# Patient Record
Sex: FEMALE | Race: WHITE | NOT HISPANIC OR LATINO
[De-identification: names, ages, dates, MRNs, and addresses within clinical notes are randomized per-mention and may not be internally consistent; named-entity substitution may affect disease eponyms.]

---

## 2017-02-06 ENCOUNTER — APPOINTMENT (OUTPATIENT)
Dept: OBGYN | Facility: CLINIC | Age: 67
End: 2017-02-06
Payer: MEDICARE

## 2017-02-06 VITALS
BODY MASS INDEX: 26.19 KG/M2 | HEIGHT: 64 IN | DIASTOLIC BLOOD PRESSURE: 85 MMHG | WEIGHT: 153.4 LBS | SYSTOLIC BLOOD PRESSURE: 130 MMHG | HEART RATE: 65 BPM

## 2017-02-06 PROCEDURE — 99213 OFFICE O/P EST LOW 20 MIN: CPT

## 2017-05-03 ENCOUNTER — APPOINTMENT (OUTPATIENT)
Dept: OBGYN | Facility: CLINIC | Age: 67
End: 2017-05-03

## 2017-05-03 VITALS
HEART RATE: 54 BPM | SYSTOLIC BLOOD PRESSURE: 147 MMHG | WEIGHT: 157 LBS | DIASTOLIC BLOOD PRESSURE: 83 MMHG | HEIGHT: 64 IN | BODY MASS INDEX: 26.8 KG/M2

## 2017-08-07 ENCOUNTER — APPOINTMENT (OUTPATIENT)
Dept: OBGYN | Facility: CLINIC | Age: 67
End: 2017-08-07
Payer: MEDICARE

## 2017-08-07 VITALS
SYSTOLIC BLOOD PRESSURE: 128 MMHG | HEART RATE: 59 BPM | DIASTOLIC BLOOD PRESSURE: 74 MMHG | BODY MASS INDEX: 26.63 KG/M2 | WEIGHT: 156 LBS | HEIGHT: 64 IN

## 2017-08-07 PROCEDURE — 99213 OFFICE O/P EST LOW 20 MIN: CPT

## 2017-11-06 ENCOUNTER — APPOINTMENT (OUTPATIENT)
Dept: OBGYN | Facility: CLINIC | Age: 67
End: 2017-11-06
Payer: MEDICARE

## 2017-11-06 VITALS
DIASTOLIC BLOOD PRESSURE: 91 MMHG | BODY MASS INDEX: 26.63 KG/M2 | HEIGHT: 64 IN | SYSTOLIC BLOOD PRESSURE: 180 MMHG | HEART RATE: 71 BPM | WEIGHT: 156 LBS

## 2017-11-06 DIAGNOSIS — N89.8 OTHER SPECIFIED NONINFLAMMATORY DISORDERS OF VAGINA: ICD-10-CM

## 2017-11-06 DIAGNOSIS — R30.0 DYSURIA: ICD-10-CM

## 2017-11-06 LAB
BILIRUB UR QL STRIP: NORMAL
GLUCOSE UR-MCNC: NORMAL
HCG UR QL: 0.2 EU/DL
HGB UR QL STRIP.AUTO: NORMAL
KETONES UR-MCNC: NORMAL
LEUKOCYTE ESTERASE UR QL STRIP: NORMAL
NITRITE UR QL STRIP: POSITIVE
PH UR STRIP: 6
PROT UR STRIP-MCNC: NORMAL
SP GR UR STRIP: 1.02

## 2017-11-06 PROCEDURE — 99214 OFFICE O/P EST MOD 30 MIN: CPT

## 2017-11-06 RX ORDER — NITROFURANTOIN (MONOHYDRATE/MACROCRYSTALS) 25; 75 MG/1; MG/1
100 CAPSULE ORAL TWICE DAILY
Qty: 14 | Refills: 0 | Status: ACTIVE | COMMUNITY
Start: 2017-11-06 | End: 1900-01-01

## 2017-12-16 LAB — BACTERIA UR CULT: ABNORMAL

## 2018-02-05 ENCOUNTER — APPOINTMENT (OUTPATIENT)
Dept: OBGYN | Facility: CLINIC | Age: 68
End: 2018-02-05
Payer: MEDICARE

## 2018-02-05 VITALS
WEIGHT: 156 LBS | DIASTOLIC BLOOD PRESSURE: 83 MMHG | HEIGHT: 64 IN | HEART RATE: 71 BPM | SYSTOLIC BLOOD PRESSURE: 141 MMHG | BODY MASS INDEX: 26.63 KG/M2

## 2018-02-05 PROCEDURE — 99213 OFFICE O/P EST LOW 20 MIN: CPT

## 2018-05-07 ENCOUNTER — APPOINTMENT (OUTPATIENT)
Dept: OBGYN | Facility: CLINIC | Age: 68
End: 2018-05-07
Payer: MEDICARE

## 2018-05-07 VITALS
DIASTOLIC BLOOD PRESSURE: 91 MMHG | HEART RATE: 55 BPM | SYSTOLIC BLOOD PRESSURE: 159 MMHG | WEIGHT: 156 LBS | BODY MASS INDEX: 26.63 KG/M2 | HEIGHT: 64 IN

## 2018-05-07 DIAGNOSIS — Z01.419 ENCOUNTER FOR GYNECOLOGICAL EXAMINATION (GENERAL) (ROUTINE) W/OUT ABNORMAL FINDINGS: ICD-10-CM

## 2018-05-07 PROCEDURE — G0101: CPT

## 2018-08-08 ENCOUNTER — APPOINTMENT (OUTPATIENT)
Dept: OBGYN | Facility: CLINIC | Age: 68
End: 2018-08-08
Payer: MEDICARE

## 2018-08-08 VITALS
SYSTOLIC BLOOD PRESSURE: 139 MMHG | HEIGHT: 64 IN | BODY MASS INDEX: 27.31 KG/M2 | DIASTOLIC BLOOD PRESSURE: 73 MMHG | HEART RATE: 62 BPM | WEIGHT: 160 LBS

## 2018-08-08 PROCEDURE — 99213 OFFICE O/P EST LOW 20 MIN: CPT

## 2018-11-07 ENCOUNTER — APPOINTMENT (OUTPATIENT)
Dept: OBGYN | Facility: CLINIC | Age: 68
End: 2018-11-07
Payer: MEDICARE

## 2018-11-07 VITALS
SYSTOLIC BLOOD PRESSURE: 139 MMHG | DIASTOLIC BLOOD PRESSURE: 76 MMHG | HEIGHT: 64 IN | WEIGHT: 156 LBS | HEART RATE: 88 BPM | BODY MASS INDEX: 26.63 KG/M2

## 2018-11-07 PROCEDURE — 99213 OFFICE O/P EST LOW 20 MIN: CPT

## 2019-02-11 ENCOUNTER — APPOINTMENT (OUTPATIENT)
Dept: OBGYN | Facility: CLINIC | Age: 69
End: 2019-02-11
Payer: MEDICARE

## 2019-02-11 VITALS
HEIGHT: 64 IN | BODY MASS INDEX: 27.14 KG/M2 | DIASTOLIC BLOOD PRESSURE: 84 MMHG | SYSTOLIC BLOOD PRESSURE: 170 MMHG | HEART RATE: 65 BPM | WEIGHT: 159 LBS

## 2019-02-11 PROCEDURE — XXXXX: CPT

## 2019-05-08 ENCOUNTER — APPOINTMENT (OUTPATIENT)
Dept: OBGYN | Facility: CLINIC | Age: 69
End: 2019-05-08
Payer: MEDICARE

## 2019-05-08 VITALS
DIASTOLIC BLOOD PRESSURE: 79 MMHG | SYSTOLIC BLOOD PRESSURE: 167 MMHG | WEIGHT: 293 LBS | HEIGHT: 64 IN | HEART RATE: 61 BPM | BODY MASS INDEX: 50.02 KG/M2

## 2019-05-08 DIAGNOSIS — N95.2 POSTMENOPAUSAL ATROPHIC VAGINITIS: ICD-10-CM

## 2019-05-08 DIAGNOSIS — Z79.890 HORMONE REPLACEMENT THERAPY: ICD-10-CM

## 2019-05-08 PROCEDURE — 99213 OFFICE O/P EST LOW 20 MIN: CPT

## 2019-08-12 ENCOUNTER — APPOINTMENT (OUTPATIENT)
Dept: OBGYN | Facility: CLINIC | Age: 69
End: 2019-08-12
Payer: MEDICARE

## 2019-08-12 VITALS
WEIGHT: 156 LBS | DIASTOLIC BLOOD PRESSURE: 68 MMHG | HEART RATE: 66 BPM | BODY MASS INDEX: 26.63 KG/M2 | HEIGHT: 64 IN | SYSTOLIC BLOOD PRESSURE: 123 MMHG

## 2019-08-12 PROCEDURE — 99213 OFFICE O/P EST LOW 20 MIN: CPT

## 2019-09-10 ENCOUNTER — MEDICATION RENEWAL (OUTPATIENT)
Age: 69
End: 2019-09-10

## 2019-11-13 ENCOUNTER — APPOINTMENT (OUTPATIENT)
Dept: OBGYN | Facility: CLINIC | Age: 69
End: 2019-11-13
Payer: MEDICARE

## 2019-11-13 VITALS
HEIGHT: 64 IN | WEIGHT: 157 LBS | SYSTOLIC BLOOD PRESSURE: 179 MMHG | BODY MASS INDEX: 26.8 KG/M2 | DIASTOLIC BLOOD PRESSURE: 99 MMHG | HEART RATE: 59 BPM

## 2019-11-13 DIAGNOSIS — Z78.0 ASYMPTOMATIC MENOPAUSAL STATE: ICD-10-CM

## 2019-11-13 PROCEDURE — 99213 OFFICE O/P EST LOW 20 MIN: CPT

## 2019-11-29 PROBLEM — Z78.0 MENOPAUSE: Status: ACTIVE | Noted: 2017-08-07

## 2020-01-06 ENCOUNTER — APPOINTMENT (OUTPATIENT)
Dept: OBGYN | Facility: CLINIC | Age: 70
End: 2020-01-06
Payer: MEDICARE

## 2020-01-06 VITALS
SYSTOLIC BLOOD PRESSURE: 132 MMHG | DIASTOLIC BLOOD PRESSURE: 82 MMHG | BODY MASS INDEX: 26.5 KG/M2 | WEIGHT: 155.25 LBS | HEART RATE: 63 BPM | HEIGHT: 64 IN

## 2020-01-06 DIAGNOSIS — N90.89 OTHER SPECIFIED NONINFLAMMATORY DISORDERS OF VULVA AND PERINEUM: ICD-10-CM

## 2020-01-06 PROCEDURE — 99213 OFFICE O/P EST LOW 20 MIN: CPT

## 2020-01-19 LAB
CANDIDA VAG CYTO: NOT DETECTED
G VAGINALIS+PREV SP MTYP VAG QL MICRO: NOT DETECTED
T VAGINALIS VAG QL WET PREP: NOT DETECTED

## 2020-01-27 ENCOUNTER — APPOINTMENT (OUTPATIENT)
Dept: OBGYN | Facility: CLINIC | Age: 70
End: 2020-01-27
Payer: MEDICARE

## 2020-01-27 VITALS
WEIGHT: 158 LBS | DIASTOLIC BLOOD PRESSURE: 90 MMHG | HEIGHT: 64 IN | SYSTOLIC BLOOD PRESSURE: 169 MMHG | BODY MASS INDEX: 26.98 KG/M2 | HEART RATE: 80 BPM

## 2020-01-27 DIAGNOSIS — N90.89 OTHER SPECIFIED NONINFLAMMATORY DISORDERS OF VULVA AND PERINEUM: ICD-10-CM

## 2020-01-27 PROCEDURE — 99212 OFFICE O/P EST SF 10 MIN: CPT | Mod: 25

## 2020-01-27 PROCEDURE — 56605 BIOPSY OF VULVA/PERINEUM: CPT

## 2020-02-15 LAB — CORE LAB BIOPSY: NORMAL

## 2020-02-19 ENCOUNTER — APPOINTMENT (OUTPATIENT)
Dept: OBGYN | Facility: CLINIC | Age: 70
End: 2020-02-19
Payer: MEDICARE

## 2020-02-19 VITALS
WEIGHT: 157 LBS | SYSTOLIC BLOOD PRESSURE: 157 MMHG | DIASTOLIC BLOOD PRESSURE: 88 MMHG | HEIGHT: 64 IN | HEART RATE: 60 BPM | BODY MASS INDEX: 26.8 KG/M2

## 2020-02-19 PROCEDURE — 99213 OFFICE O/P EST LOW 20 MIN: CPT

## 2020-03-27 PROBLEM — N90.89 VULVAR LESION: Status: ACTIVE | Noted: 2020-03-27

## 2020-03-27 NOTE — PHYSICAL EXAM
[Labia Majora Erythema Left] : erythema of the left labia majora [de-identified] : left labia majora After infiltration of 1% lidoaine, a small punch biopsy was obtained. hemostsis with direct pressure.

## 2020-03-31 ENCOUNTER — INPATIENT (INPATIENT)
Facility: HOSPITAL | Age: 70
LOS: 22 days | DRG: 208 | End: 2020-04-23
Attending: INTERNAL MEDICINE | Admitting: HOSPITALIST
Payer: MEDICARE

## 2020-03-31 VITALS
HEART RATE: 86 BPM | SYSTOLIC BLOOD PRESSURE: 158 MMHG | RESPIRATION RATE: 20 BRPM | OXYGEN SATURATION: 94 % | HEIGHT: 63 IN | WEIGHT: 149.91 LBS | TEMPERATURE: 100 F | DIASTOLIC BLOOD PRESSURE: 89 MMHG

## 2020-03-31 DIAGNOSIS — Z98.891 HISTORY OF UTERINE SCAR FROM PREVIOUS SURGERY: Chronic | ICD-10-CM

## 2020-03-31 DIAGNOSIS — R68.89 OTHER GENERAL SYMPTOMS AND SIGNS: ICD-10-CM

## 2020-03-31 LAB
ALBUMIN SERPL ELPH-MCNC: 3.1 G/DL — LOW (ref 3.3–5)
ALP SERPL-CCNC: 88 U/L — SIGNIFICANT CHANGE UP (ref 40–120)
ALT FLD-CCNC: 56 U/L — HIGH (ref 10–45)
ANION GAP SERPL CALC-SCNC: 16 MMOL/L — SIGNIFICANT CHANGE UP (ref 5–17)
AST SERPL-CCNC: 81 U/L — HIGH (ref 10–40)
BASE EXCESS BLDV CALC-SCNC: 2.4 MMOL/L — HIGH (ref -2–2)
BASOPHILS # BLD AUTO: 0.01 K/UL — SIGNIFICANT CHANGE UP (ref 0–0.2)
BASOPHILS NFR BLD AUTO: 0.1 % — SIGNIFICANT CHANGE UP (ref 0–2)
BILIRUB SERPL-MCNC: 0.4 MG/DL — SIGNIFICANT CHANGE UP (ref 0.2–1.2)
BUN SERPL-MCNC: 12 MG/DL — SIGNIFICANT CHANGE UP (ref 7–23)
CALCIUM SERPL-MCNC: 8.7 MG/DL — SIGNIFICANT CHANGE UP (ref 8.4–10.5)
CHLORIDE SERPL-SCNC: 92 MMOL/L — LOW (ref 96–108)
CO2 BLDV-SCNC: 26 MMOL/L — SIGNIFICANT CHANGE UP (ref 22–30)
CO2 SERPL-SCNC: 22 MMOL/L — SIGNIFICANT CHANGE UP (ref 22–31)
CREAT SERPL-MCNC: 0.76 MG/DL — SIGNIFICANT CHANGE UP (ref 0.5–1.3)
D DIMER BLD IA.RAPID-MCNC: 325 NG/ML DDU — HIGH
EOSINOPHIL # BLD AUTO: 0 K/UL — SIGNIFICANT CHANGE UP (ref 0–0.5)
EOSINOPHIL NFR BLD AUTO: 0 % — SIGNIFICANT CHANGE UP (ref 0–6)
ERYTHROCYTE [SEDIMENTATION RATE] IN BLOOD: 106 MM/HR — HIGH (ref 0–20)
GAS PNL BLDV: SIGNIFICANT CHANGE UP
GLUCOSE SERPL-MCNC: 171 MG/DL — HIGH (ref 70–99)
HCO3 BLDV-SCNC: 25 MMOL/L — SIGNIFICANT CHANGE UP (ref 21–29)
HCT VFR BLD CALC: 40.7 % — SIGNIFICANT CHANGE UP (ref 34.5–45)
HGB BLD-MCNC: 13.6 G/DL — SIGNIFICANT CHANGE UP (ref 11.5–15.5)
IMM GRANULOCYTES NFR BLD AUTO: 0.7 % — SIGNIFICANT CHANGE UP (ref 0–1.5)
LDH SERPL L TO P-CCNC: 501 U/L — HIGH (ref 50–242)
LYMPHOCYTES # BLD AUTO: 0.74 K/UL — LOW (ref 1–3.3)
LYMPHOCYTES # BLD AUTO: 9.7 % — LOW (ref 13–44)
MCHC RBC-ENTMCNC: 30.6 PG — SIGNIFICANT CHANGE UP (ref 27–34)
MCHC RBC-ENTMCNC: 33.4 GM/DL — SIGNIFICANT CHANGE UP (ref 32–36)
MCV RBC AUTO: 91.7 FL — SIGNIFICANT CHANGE UP (ref 80–100)
MONOCYTES # BLD AUTO: 0.24 K/UL — SIGNIFICANT CHANGE UP (ref 0–0.9)
MONOCYTES NFR BLD AUTO: 3.1 % — SIGNIFICANT CHANGE UP (ref 2–14)
NEUTROPHILS # BLD AUTO: 6.59 K/UL — SIGNIFICANT CHANGE UP (ref 1.8–7.4)
NEUTROPHILS NFR BLD AUTO: 86.4 % — HIGH (ref 43–77)
NRBC # BLD: 0 /100 WBCS — SIGNIFICANT CHANGE UP (ref 0–0)
PCO2 BLDV: 33 MMHG — LOW (ref 35–50)
PH BLDV: 7.49 — HIGH (ref 7.35–7.45)
PLATELET # BLD AUTO: 207 K/UL — SIGNIFICANT CHANGE UP (ref 150–400)
PO2 BLDV: 57 MMHG — HIGH (ref 25–45)
POTASSIUM SERPL-MCNC: 3.4 MMOL/L — LOW (ref 3.5–5.3)
POTASSIUM SERPL-SCNC: 3.4 MMOL/L — LOW (ref 3.5–5.3)
PROCALCITONIN SERPL-MCNC: 1 NG/ML — HIGH (ref 0.02–0.1)
PROT SERPL-MCNC: 7.2 G/DL — SIGNIFICANT CHANGE UP (ref 6–8.3)
RBC # BLD: 4.44 M/UL — SIGNIFICANT CHANGE UP (ref 3.8–5.2)
RBC # FLD: 13.1 % — SIGNIFICANT CHANGE UP (ref 10.3–14.5)
SAO2 % BLDV: 91 % — HIGH (ref 67–88)
SODIUM SERPL-SCNC: 130 MMOL/L — LOW (ref 135–145)
WBC # BLD: 7.63 K/UL — SIGNIFICANT CHANGE UP (ref 3.8–10.5)
WBC # FLD AUTO: 7.63 K/UL — SIGNIFICANT CHANGE UP (ref 3.8–10.5)

## 2020-03-31 PROCEDURE — 93010 ELECTROCARDIOGRAM REPORT: CPT | Mod: GC

## 2020-03-31 PROCEDURE — 99285 EMERGENCY DEPT VISIT HI MDM: CPT | Mod: GC

## 2020-03-31 PROCEDURE — 71045 X-RAY EXAM CHEST 1 VIEW: CPT | Mod: 26

## 2020-03-31 RX ORDER — POTASSIUM CHLORIDE 20 MEQ
40 PACKET (EA) ORAL ONCE
Refills: 0 | Status: COMPLETED | OUTPATIENT
Start: 2020-03-31 | End: 2020-03-31

## 2020-03-31 RX ORDER — FUROSEMIDE 40 MG
20 TABLET ORAL ONCE
Refills: 0 | Status: COMPLETED | OUTPATIENT
Start: 2020-03-31 | End: 2020-03-31

## 2020-03-31 RX ADMIN — Medication 20 MILLIGRAM(S): at 21:54

## 2020-03-31 RX ADMIN — Medication 40 MILLIEQUIVALENT(S): at 23:27

## 2020-03-31 NOTE — ED PROVIDER NOTE - CARE PLAN
Principal Discharge DX:	Suspected 2019 novel coronavirus infection  Assessment and plan of treatment:	Patient presented with acute onset of hypoxic respiratory failure due to corona virus, requiring NRB.

## 2020-03-31 NOTE — ED PROVIDER NOTE - ATTENDING CONTRIBUTION TO CARE
*** PATIENT SEEN DURING COVID PANDEMIC -- [place] IN STATE OF [facility] -- HOSPITAL RESROUCES ARE CRITICALLY OVERWHELMED -- NORMAL DECISION MAKING PROCESS IS SIGNIFICANTLY ALTERED -- RISK/BENEFIT ANALYSIS FAVORS DISCHARGE/OUTPATIENT MANAGEMENT IN MANY CASES THAT WOULD NORMALLY BE ADMITTED ***     70 yo female p/w fever, cough, SOB.  hypoxic on room air.  likely COVID.  check labs, CXR, COVID swab, supplemental O2, admit.

## 2020-03-31 NOTE — ED PROVIDER NOTE - PHYSICAL EXAMINATION
GENERAL: respiratory distress on 6L NC, tachypneic, AAOX3  HEENT:  Conjunctiva and sclera clear, oral mucosa moist, clear w/o any exudate   CHEST/LUNG: B/l crackles in the mid and lower lungs  HEART: Regular rate and rhythm; No murmurs, rubs, or gallops  ABDOMEN: Soft, Nontender, Nondistended; Bowel sounds present  EXTREMITIES:  1+ pitting edema  PSYCH: AAOx3  NEUROLOGY: non-focal, moving all extremities.   SKIN: No rashes or lesions

## 2020-03-31 NOTE — ED ADULT NURSE NOTE - AS TEMP SITE
PA initiated on 9/14/18 for Econazole 1% cream via cover my meds. Decision to be faxed over within 3 - 5 business days.   oral

## 2020-03-31 NOTE — ED ADULT NURSE NOTE - OBJECTIVE STATEMENT
69y M aaox4 ambulatory from home presents to Ed via EMS from home c/o SOB, fever, as per pt states symptoms  started 2 weeks ago fever and chills. "on and off" , worsening in the last 3-4 days w/ SOB in exertion, non productive cough , also c/o diarrhea, Nausea. Pt states "little" cp . Pt denies, HA, vision changes, vomit, abdominal pain, weakness, dizziness,  issues. Safety and comfort measures initiated- bed placed in lowest position and side rails raised. Pt oriented to call bell system.

## 2020-03-31 NOTE — ED PROVIDER NOTE - OBJECTIVE STATEMENT
70 yo F with PMhx of HTN and prediabetes presents to the ED with complaints of fever, chills, dry cough and worsening SOB. Patient reports that she has been feeling febrile for the last 2 weeks. She has intermittent chills and dry cough. She feels chest pressure when she coughs. Her SOB has been worsening. She becomes short of breath when she walks to the bathroom from the bed. She also reports to have orthopnea and LE edema. She has been sleeping in the chair for the last couple of days. She denies any recent travel. She went to [place] before her symptoms and thinks that she was exposed there. She reports to have HTN and prediabetes. She had C-sectionsx2 in the past. No family hx of heart or lung disease. Lives with her . Able to take care of ADLs. No hx of smoking or chronic alcohol consumptions. Allergic to neuomycin-develops rash and swelling. 70 yo F with PMhx of HTN and prediabetes presents to the ED with complaints of fever, chills, dry cough and worsening SOB. Patient reports that she has been feeling febrile for the last 2 weeks. She has intermittent chills and dry cough. She feels chest pressure when she coughs. Her SOB has been worsening. She becomes short of breath when she walks to the bathroom from the bed. She also reports to have orthopnea and LE edema. She has been sleeping in the chair for the last couple of days. She denies any recent travel. She went to [employer] before her symptoms and thinks that she was exposed there. She reports to have HTN and prediabetes. She had C-sectionsx2 in the past. No family hx of heart or lung disease. Lives with her . Able to take care of ADLs. No hx of smoking or chronic alcohol consumptions. Allergic to neuomycin-develops rash and swelling.  PCP: Dr. Hugh Pinedo

## 2020-03-31 NOTE — ED ADULT NURSE NOTE - NSIMPLEMENTINTERV_GEN_ALL_ED
Implemented All Fall Risk Interventions:  [place] to call system. Call bell, personal items and telephone within reach. Instruct patient to call for assistance. Room bathroom lighting operational. Non-slip footwear when patient is off stretcher. Physically safe environment: no spills, clutter or unnecessary equipment. Stretcher in lowest position, wheels locked, appropriate side rails in place. Provide visual cue, wrist band, yellow gown, etc. Monitor gait and stability. Monitor for mental status changes and reorient to person, place, and time. Review medications for side effects contributing to fall risk. Reinforce activity limits and safety measures with patient and family.

## 2020-03-31 NOTE — ED PROVIDER NOTE - NS ED ROS FT
CONSTITUTIONAL: + fevers  HEENT: no dysphagia  CV: no chest pain  RESP: + SOB  GI: no nausea/vomiting  : no dysuria  DERM: no rash  MSK: no back pain  NEURO: no LOC  ENDO: + prediabetes

## 2020-03-31 NOTE — ED PROVIDER NOTE - PLAN OF CARE
Patient presented with acute onset of hypoxic respiratory failure due to corona virus, requiring NRB.

## 2020-04-01 DIAGNOSIS — I10 ESSENTIAL (PRIMARY) HYPERTENSION: ICD-10-CM

## 2020-04-01 DIAGNOSIS — E87.6 HYPOKALEMIA: ICD-10-CM

## 2020-04-01 DIAGNOSIS — J18.9 PNEUMONIA, UNSPECIFIED ORGANISM: ICD-10-CM

## 2020-04-01 DIAGNOSIS — Z02.9 ENCOUNTER FOR ADMINISTRATIVE EXAMINATIONS, UNSPECIFIED: ICD-10-CM

## 2020-04-01 DIAGNOSIS — I50.9 HEART FAILURE, UNSPECIFIED: ICD-10-CM

## 2020-04-01 DIAGNOSIS — J96.01 ACUTE RESPIRATORY FAILURE WITH HYPOXIA: ICD-10-CM

## 2020-04-01 LAB
ALBUMIN SERPL ELPH-MCNC: 2.6 G/DL — LOW (ref 3.3–5)
ALP SERPL-CCNC: 74 U/L — SIGNIFICANT CHANGE UP (ref 40–120)
ALT FLD-CCNC: 48 U/L — HIGH (ref 10–45)
ANION GAP SERPL CALC-SCNC: 14 MMOL/L — SIGNIFICANT CHANGE UP (ref 5–17)
ANION GAP SERPL CALC-SCNC: 14 MMOL/L — SIGNIFICANT CHANGE UP (ref 5–17)
APTT BLD: 30.9 SEC — SIGNIFICANT CHANGE UP (ref 27.5–36.3)
AST SERPL-CCNC: 67 U/L — HIGH (ref 10–40)
BILIRUB SERPL-MCNC: 0.2 MG/DL — SIGNIFICANT CHANGE UP (ref 0.2–1.2)
BUN SERPL-MCNC: 12 MG/DL — SIGNIFICANT CHANGE UP (ref 7–23)
BUN SERPL-MCNC: 14 MG/DL — SIGNIFICANT CHANGE UP (ref 7–23)
CALCIUM SERPL-MCNC: 7.4 MG/DL — LOW (ref 8.4–10.5)
CALCIUM SERPL-MCNC: 8.8 MG/DL — SIGNIFICANT CHANGE UP (ref 8.4–10.5)
CHLORIDE SERPL-SCNC: 98 MMOL/L — SIGNIFICANT CHANGE UP (ref 96–108)
CHLORIDE SERPL-SCNC: 99 MMOL/L — SIGNIFICANT CHANGE UP (ref 96–108)
CK MB BLD-MCNC: 0.5 % — SIGNIFICANT CHANGE UP (ref 0–3.5)
CK MB BLD-MCNC: 0.6 % — SIGNIFICANT CHANGE UP (ref 0–3.5)
CK MB CFR SERPL CALC: 1 NG/ML — SIGNIFICANT CHANGE UP (ref 0–3.8)
CK MB CFR SERPL CALC: 1 NG/ML — SIGNIFICANT CHANGE UP (ref 0–3.8)
CK SERPL-CCNC: 176 U/L — HIGH (ref 25–170)
CK SERPL-CCNC: 214 U/L — HIGH (ref 25–170)
CO2 SERPL-SCNC: 22 MMOL/L — SIGNIFICANT CHANGE UP (ref 22–31)
CO2 SERPL-SCNC: 23 MMOL/L — SIGNIFICANT CHANGE UP (ref 22–31)
CREAT SERPL-MCNC: 0.71 MG/DL — SIGNIFICANT CHANGE UP (ref 0.5–1.3)
CREAT SERPL-MCNC: 0.72 MG/DL — SIGNIFICANT CHANGE UP (ref 0.5–1.3)
CRP SERPL-MCNC: 19.97 MG/DL — HIGH (ref 0–0.4)
ERYTHROCYTE [SEDIMENTATION RATE] IN BLOOD: 120 MM/HR — HIGH (ref 0–20)
FERRITIN SERPL-MCNC: 1316 NG/ML — HIGH (ref 15–150)
GLUCOSE SERPL-MCNC: 127 MG/DL — HIGH (ref 70–99)
GLUCOSE SERPL-MCNC: 137 MG/DL — HIGH (ref 70–99)
HCT VFR BLD CALC: 38.7 % — SIGNIFICANT CHANGE UP (ref 34.5–45)
HCV AB S/CO SERPL IA: 0.18 S/CO — SIGNIFICANT CHANGE UP (ref 0–0.99)
HCV AB SERPL-IMP: SIGNIFICANT CHANGE UP
HGB BLD-MCNC: 13.4 G/DL — SIGNIFICANT CHANGE UP (ref 11.5–15.5)
INR BLD: 1.22 RATIO — HIGH (ref 0.88–1.16)
LDH SERPL L TO P-CCNC: 420 U/L — HIGH (ref 50–242)
MAGNESIUM SERPL-MCNC: 1.3 MG/DL — LOW (ref 1.6–2.6)
MAGNESIUM SERPL-MCNC: 2.1 MG/DL — SIGNIFICANT CHANGE UP (ref 1.6–2.6)
MCHC RBC-ENTMCNC: 31.4 PG — SIGNIFICANT CHANGE UP (ref 27–34)
MCHC RBC-ENTMCNC: 34.6 GM/DL — SIGNIFICANT CHANGE UP (ref 32–36)
MCV RBC AUTO: 90.6 FL — SIGNIFICANT CHANGE UP (ref 80–100)
NRBC # BLD: 0 /100 WBCS — SIGNIFICANT CHANGE UP (ref 0–0)
NT-PROBNP SERPL-SCNC: 433 PG/ML — HIGH (ref 0–300)
PHOSPHATE SERPL-MCNC: 2 MG/DL — LOW (ref 2.5–4.5)
PLATELET # BLD AUTO: 206 K/UL — SIGNIFICANT CHANGE UP (ref 150–400)
POTASSIUM SERPL-MCNC: 2.8 MMOL/L — CRITICAL LOW (ref 3.5–5.3)
POTASSIUM SERPL-MCNC: 4.2 MMOL/L — SIGNIFICANT CHANGE UP (ref 3.5–5.3)
POTASSIUM SERPL-SCNC: 2.8 MMOL/L — CRITICAL LOW (ref 3.5–5.3)
POTASSIUM SERPL-SCNC: 4.2 MMOL/L — SIGNIFICANT CHANGE UP (ref 3.5–5.3)
PROCALCITONIN SERPL-MCNC: 1.14 NG/ML — HIGH (ref 0.02–0.1)
PROT SERPL-MCNC: 6.2 G/DL — SIGNIFICANT CHANGE UP (ref 6–8.3)
PROTHROM AB SERPL-ACNC: 14 SEC — HIGH (ref 10–13.1)
RBC # BLD: 4.27 M/UL — SIGNIFICANT CHANGE UP (ref 3.8–5.2)
RBC # FLD: 13.2 % — SIGNIFICANT CHANGE UP (ref 10.3–14.5)
SARS-COV-2 RNA SPEC QL NAA+PROBE: DETECTED
SODIUM SERPL-SCNC: 134 MMOL/L — LOW (ref 135–145)
SODIUM SERPL-SCNC: 136 MMOL/L — SIGNIFICANT CHANGE UP (ref 135–145)
TROPONIN T, HIGH SENSITIVITY RESULT: 14 NG/L — SIGNIFICANT CHANGE UP (ref 0–51)
TROPONIN T, HIGH SENSITIVITY RESULT: 15 NG/L — SIGNIFICANT CHANGE UP (ref 0–51)
WBC # BLD: 6.89 K/UL — SIGNIFICANT CHANGE UP (ref 3.8–10.5)
WBC # FLD AUTO: 6.89 K/UL — SIGNIFICANT CHANGE UP (ref 3.8–10.5)

## 2020-04-01 PROCEDURE — 99233 SBSQ HOSP IP/OBS HIGH 50: CPT

## 2020-04-01 RX ORDER — HYDROXYCHLOROQUINE SULFATE 200 MG
200 TABLET ORAL EVERY 12 HOURS
Refills: 0 | Status: COMPLETED | OUTPATIENT
Start: 2020-04-03 | End: 2020-04-06

## 2020-04-01 RX ORDER — ALLOPURINOL 300 MG
300 TABLET ORAL DAILY
Refills: 0 | Status: DISCONTINUED | OUTPATIENT
Start: 2020-04-01 | End: 2020-04-23

## 2020-04-01 RX ORDER — DORZOLAMIDE HYDROCHLORIDE TIMOLOL MALEATE 20; 5 MG/ML; MG/ML
1 SOLUTION/ DROPS OPHTHALMIC
Refills: 0 | Status: DISCONTINUED | OUTPATIENT
Start: 2020-04-01 | End: 2020-04-23

## 2020-04-01 RX ORDER — AZITHROMYCIN 500 MG/1
500 TABLET, FILM COATED ORAL EVERY 24 HOURS
Refills: 0 | Status: DISCONTINUED | OUTPATIENT
Start: 2020-04-02 | End: 2020-04-06

## 2020-04-01 RX ORDER — LATANOPROST 0.05 MG/ML
1 SOLUTION/ DROPS OPHTHALMIC; TOPICAL AT BEDTIME
Refills: 0 | Status: DISCONTINUED | OUTPATIENT
Start: 2020-04-01 | End: 2020-04-23

## 2020-04-01 RX ORDER — TRIAMTERENE/HYDROCHLOROTHIAZID 75 MG-50MG
1 TABLET ORAL
Qty: 0 | Refills: 0 | DISCHARGE

## 2020-04-01 RX ORDER — MAGNESIUM SULFATE 500 MG/ML
1 VIAL (ML) INJECTION ONCE
Refills: 0 | Status: COMPLETED | OUTPATIENT
Start: 2020-04-01 | End: 2020-04-01

## 2020-04-01 RX ORDER — CEFTRIAXONE 500 MG/1
1000 INJECTION, POWDER, FOR SOLUTION INTRAMUSCULAR; INTRAVENOUS EVERY 24 HOURS
Refills: 0 | Status: COMPLETED | OUTPATIENT
Start: 2020-04-01 | End: 2020-04-05

## 2020-04-01 RX ORDER — HYDROXYCHLOROQUINE SULFATE 200 MG
400 TABLET ORAL EVERY 12 HOURS
Refills: 0 | Status: COMPLETED | OUTPATIENT
Start: 2020-04-02 | End: 2020-04-02

## 2020-04-01 RX ORDER — POTASSIUM CHLORIDE 20 MEQ
10 PACKET (EA) ORAL
Refills: 0 | Status: COMPLETED | OUTPATIENT
Start: 2020-04-01 | End: 2020-04-01

## 2020-04-01 RX ORDER — LISINOPRIL 2.5 MG/1
20 TABLET ORAL DAILY
Refills: 0 | Status: DISCONTINUED | OUTPATIENT
Start: 2020-04-01 | End: 2020-04-20

## 2020-04-01 RX ORDER — POTASSIUM CHLORIDE 20 MEQ
40 PACKET (EA) ORAL ONCE
Refills: 0 | Status: COMPLETED | OUTPATIENT
Start: 2020-04-01 | End: 2020-04-01

## 2020-04-01 RX ORDER — ALLOPURINOL 300 MG
1 TABLET ORAL
Qty: 0 | Refills: 0 | DISCHARGE

## 2020-04-01 RX ORDER — ACETAMINOPHEN 500 MG
650 TABLET ORAL EVERY 6 HOURS
Refills: 0 | Status: DISCONTINUED | OUTPATIENT
Start: 2020-04-01 | End: 2020-04-23

## 2020-04-01 RX ORDER — DORZOLAMIDE HYDROCHLORIDE TIMOLOL MALEATE 20; 5 MG/ML; MG/ML
1 SOLUTION/ DROPS OPHTHALMIC
Qty: 0 | Refills: 0 | DISCHARGE

## 2020-04-01 RX ORDER — ENOXAPARIN SODIUM 100 MG/ML
40 INJECTION SUBCUTANEOUS DAILY
Refills: 0 | Status: DISCONTINUED | OUTPATIENT
Start: 2020-04-01 | End: 2020-04-10

## 2020-04-01 RX ORDER — AZITHROMYCIN 500 MG/1
TABLET, FILM COATED ORAL
Refills: 0 | Status: DISCONTINUED | OUTPATIENT
Start: 2020-04-01 | End: 2020-04-06

## 2020-04-01 RX ORDER — HYDROXYCHLOROQUINE SULFATE 200 MG
TABLET ORAL
Refills: 0 | Status: COMPLETED | OUTPATIENT
Start: 2020-04-01 | End: 2020-04-06

## 2020-04-01 RX ORDER — LATANOPROST 0.05 MG/ML
1 SOLUTION/ DROPS OPHTHALMIC; TOPICAL
Qty: 0 | Refills: 0 | DISCHARGE

## 2020-04-01 RX ORDER — LISINOPRIL 2.5 MG/1
1 TABLET ORAL
Qty: 0 | Refills: 0 | DISCHARGE

## 2020-04-01 RX ORDER — AZITHROMYCIN 500 MG/1
500 TABLET, FILM COATED ORAL ONCE
Refills: 0 | Status: COMPLETED | OUTPATIENT
Start: 2020-04-01 | End: 2020-04-01

## 2020-04-01 RX ADMIN — LATANOPROST 1 DROP(S): 0.05 SOLUTION/ DROPS OPHTHALMIC; TOPICAL at 22:46

## 2020-04-01 RX ADMIN — ENOXAPARIN SODIUM 40 MILLIGRAM(S): 100 INJECTION SUBCUTANEOUS at 13:21

## 2020-04-01 RX ADMIN — Medication 100 GRAM(S): at 13:22

## 2020-04-01 RX ADMIN — AZITHROMYCIN 250 MILLIGRAM(S): 500 TABLET, FILM COATED ORAL at 04:33

## 2020-04-01 RX ADMIN — Medication 100 MILLIEQUIVALENT(S): at 16:12

## 2020-04-01 RX ADMIN — CEFTRIAXONE 100 MILLIGRAM(S): 500 INJECTION, POWDER, FOR SOLUTION INTRAMUSCULAR; INTRAVENOUS at 04:15

## 2020-04-01 RX ADMIN — Medication 650 MILLIGRAM(S): at 04:30

## 2020-04-01 RX ADMIN — LISINOPRIL 20 MILLIGRAM(S): 2.5 TABLET ORAL at 05:12

## 2020-04-01 RX ADMIN — Medication 650 MILLIGRAM(S): at 13:18

## 2020-04-01 RX ADMIN — Medication 300 MILLIGRAM(S): at 13:18

## 2020-04-01 RX ADMIN — Medication 100 MILLIEQUIVALENT(S): at 09:10

## 2020-04-01 RX ADMIN — Medication 100 GRAM(S): at 06:37

## 2020-04-01 RX ADMIN — Medication 100 MILLIEQUIVALENT(S): at 14:32

## 2020-04-01 RX ADMIN — Medication 40 MILLIEQUIVALENT(S): at 09:09

## 2020-04-01 RX ADMIN — DORZOLAMIDE HYDROCHLORIDE TIMOLOL MALEATE 1 DROP(S): 20; 5 SOLUTION/ DROPS OPHTHALMIC at 17:27

## 2020-04-01 RX ADMIN — DORZOLAMIDE HYDROCHLORIDE TIMOLOL MALEATE 1 DROP(S): 20; 5 SOLUTION/ DROPS OPHTHALMIC at 05:16

## 2020-04-01 RX ADMIN — Medication 650 MILLIGRAM(S): at 19:05

## 2020-04-01 NOTE — H&P ADULT - PROBLEM SELECTOR PLAN 3
history of orthopnea and lower extremity swelling which is new. No reported hx of heart failure per discussion with family  - no charted chest pain. EKG does NOT demonstrate STEMI. send troponin, ck, ckmb and repeat w/ am labs for delta to evaluate for development of myocarditis (given case reports in COVID19 patients), obtain BNP. pending COVID eval should have TTE (will need hospitalist during the day to determine if HF worsening and requires inpatient,; for now plan to complete as outpatient)

## 2020-04-01 NOTE — H&P ADULT - ATTENDING COMMENTS
Patient assigned to me by night hospitalist in charge for management and care for patient for this evening only. Care to be resumed by day hospitalist at 08:00 in the morning and thereafter.     Admit Diagnoses  - Acute Respiratory Failure with Hypoxia- J96.01  - Pneumonia, unspecified organism- J18.9    Michael Keller MD  Department of Hospital Medicine  pager: [phone] (available from 20:00 to 08:00)

## 2020-04-01 NOTE — ED ADULT NURSE REASSESSMENT NOTE - NS ED NURSE REASSESS COMMENT FT1
Pt. resting comfortably in stretcher in no acute distress. As per night shift RN Mirella Tompkins, pt. desatted while using the bed pan and was increased to 15 lpm via NRB. Upon assessment, pt. does not appear to be in any respiratory distress. No accessory muscle use or retractions. 95% on 15 lpm via NRB. All VSS. Will continue to reassess.

## 2020-04-01 NOTE — ED ADULT NURSE REASSESSMENT NOTE - NS ED NURSE REASSESS COMMENT FT1
Pt was assisted with bedpan, exertion caused pt to become hypoxic on NC.  Pt placed no nonrebreather.  MD Keller made aware.  Pt improved.  Safety and comfort maintained. Will continue to monitor.

## 2020-04-01 NOTE — H&P ADULT - NSHPLABSRESULTS_GEN_ALL_CORE
Personally reviewed available labs, imaging and ekg  Labs  CBC Full  -  ( 31 Mar 2020 21:52 )  WBC Count : 7.63 K/uL  RBC Count : 4.44 M/uL  Hemoglobin : 13.6 g/dL  Hematocrit : 40.7 %  Platelet Count - Automated : 207 K/uL  Mean Cell Volume : 91.7 fl  Mean Cell Hemoglobin : 30.6 pg  Mean Cell Hemoglobin Concentration : 33.4 gm/dL  Auto Neutrophil # : 6.59 K/uL  Auto Lymphocyte # : 0.74 K/uL  Auto Monocyte # : 0.24 K/uL  Auto Eosinophil # : 0.00 K/uL  Auto Basophil # : 0.01 K/uL  Auto Neutrophil % : 86.4 %  Auto Lymphocyte % : 9.7 %  Auto Monocyte % : 3.1 %  Auto Eosinophil % : 0.0 %  Auto Basophil % : 0.1 %  03-31  130<L>  |  92<L>  |  12  ----------------------------<  171<H>  3.4<L>   |  22  |  0.76  Ca    8.7      31 Mar 2020 21:52  TPro  7.2  /  Alb  3.1<L>  /  TBili  0.4  /  DBili  x   /  AST  81<H>  /  ALT  56<H>  /  AlkPhos  88  03-31  21:53 - VBG - pH: 7.49  | pCO2: 33    | pO2: 57    | Lactate:        Imaging  CXR with Left lung opacification  EKG: NSR 84bpm, [id number], NO STEMI appreciated

## 2020-04-01 NOTE — H&P ADULT - HISTORY OF PRESENT ILLNESS
Portions of this History and Physical Exam are adopted from ER Provider Notation per [facility] policy to limit healthcare provider exposure during COVID19 Pandemic    Unable to reach patient in ED via phone. Called the patient's  Mr. Cooper Gibbons to obtain additional history.  Adopted from ED notation:   HPI Objective Statement: 68 yo F with PMhx of HTN and prediabetes presents to the ED with complaints of fever, chills, dry cough and worsening SOB. Patient reports that she has been feeling febrile for the last 2 weeks. She has intermittent chills and dry cough. She feels chest pressure when she coughs. Her SOB has been worsening. She becomes short of breath when she walks to the bathroom from the bed. She also reports to have orthopnea and LE edema. She has been sleeping in the chair for the last couple of days. She denies any recent travel. She went to [place] before her symptoms and thinks that she was exposed there. She reports to have HTN and prediabetes. She had C-sectionsx2 in the past. No family hx of heart or lung disease. Lives with her . Able to take care of ADLs. No hx of smoking or chronic alcohol consumptions. Allergic to neuomycin-develops rash and swelling.  PCP: Dr. Hugh Pinedo    In the ED, .2, 158/89, 86->111, 18 100% NRB  s/p [id number] IVx1, potassium 40 POx1

## 2020-04-01 NOTE — H&P ADULT - PROBLEM SELECTOR PLAN 5
c/w lisinopril home dosing (Per WHO guidelines there is no recommendation to discontinue ace-inhibitor medication)  - HCTZ/Triamterene is dosed every other day outpatient, will hold for now

## 2020-04-01 NOTE — H&P ADULT - PROBLEM SELECTOR PLAN 1
- COVID19 PCR collected and is PENDING  - Chest imaging consistent with left-sided pneumonia  - Continue with NRB and avoid BiLevel/High Flow Nasal Cannula per institution policy given risk of aerosolization  - empiric antibiotics for bacterial infection given due to clinical status  - obtain cbc with diff, CMP w/ lytes, coag,procalcitonin, ESR, CRP, LDH, Ferritin,   - monitor EKG for QTc prolongation  - isolation precautions

## 2020-04-01 NOTE — H&P ADULT - ASSESSMENT
70 yo F with PMhx of HTN and prediabetes presents to the ED with complaints of fever, chills, dry cough and worsening SOB admit to medicine for acute respiratory failure with hypoxia from pneumonia with concern for COVID19

## 2020-04-01 NOTE — H&P ADULT - PROBLEM SELECTOR PLAN 6
Transitions of Care Status:  1.  Name of PCP: Dr. Staples  2.  PCP Contacted on Admission: [ ] Y    [X ] N    3.  PCP contacted at Discharge: [ ] Y    [ ] N    [ ] N/A  4.  Post-Discharge Appointment Date and Location:  5.  Summary of Handoff given to PCP:

## 2020-04-02 DIAGNOSIS — Z29.9 ENCOUNTER FOR PROPHYLACTIC MEASURES, UNSPECIFIED: ICD-10-CM

## 2020-04-02 DIAGNOSIS — B34.2 CORONAVIRUS INFECTION, UNSPECIFIED: ICD-10-CM

## 2020-04-02 LAB
ALBUMIN SERPL ELPH-MCNC: 3 G/DL — LOW (ref 3.3–5)
ALP SERPL-CCNC: 118 U/L — SIGNIFICANT CHANGE UP (ref 40–120)
ALT FLD-CCNC: 101 U/L — HIGH (ref 10–45)
ANION GAP SERPL CALC-SCNC: 15 MMOL/L — SIGNIFICANT CHANGE UP (ref 5–17)
AST SERPL-CCNC: 171 U/L — HIGH (ref 10–40)
BILIRUB SERPL-MCNC: 0.3 MG/DL — SIGNIFICANT CHANGE UP (ref 0.2–1.2)
BUN SERPL-MCNC: 13 MG/DL — SIGNIFICANT CHANGE UP (ref 7–23)
CALCIUM SERPL-MCNC: 8.9 MG/DL — SIGNIFICANT CHANGE UP (ref 8.4–10.5)
CHLORIDE SERPL-SCNC: 95 MMOL/L — LOW (ref 96–108)
CK MB BLD-MCNC: 0.7 % — SIGNIFICANT CHANGE UP (ref 0–3.5)
CK MB CFR SERPL CALC: 1.6 NG/ML — SIGNIFICANT CHANGE UP (ref 0–3.8)
CK SERPL-CCNC: 228 U/L — HIGH (ref 25–170)
CO2 SERPL-SCNC: 24 MMOL/L — SIGNIFICANT CHANGE UP (ref 22–31)
CREAT SERPL-MCNC: 0.69 MG/DL — SIGNIFICANT CHANGE UP (ref 0.5–1.3)
CRP SERPL-MCNC: 21.06 MG/DL — HIGH (ref 0–0.4)
CRP SERPL-MCNC: 24.28 MG/DL — HIGH (ref 0–0.4)
FERRITIN SERPL-MCNC: 3451 NG/ML — HIGH (ref 15–150)
FIBRINOGEN AG PPP IA-MCNC: 658 MG/DL — HIGH
GLUCOSE SERPL-MCNC: 131 MG/DL — HIGH (ref 70–99)
HCT VFR BLD CALC: 39.9 % — SIGNIFICANT CHANGE UP (ref 34.5–45)
HGB BLD-MCNC: 13.4 G/DL — SIGNIFICANT CHANGE UP (ref 11.5–15.5)
MAGNESIUM SERPL-MCNC: 2 MG/DL — SIGNIFICANT CHANGE UP (ref 1.6–2.6)
MCHC RBC-ENTMCNC: 30.8 PG — SIGNIFICANT CHANGE UP (ref 27–34)
MCHC RBC-ENTMCNC: 33.6 GM/DL — SIGNIFICANT CHANGE UP (ref 32–36)
MCV RBC AUTO: 91.7 FL — SIGNIFICANT CHANGE UP (ref 80–100)
NRBC # BLD: 0 /100 WBCS — SIGNIFICANT CHANGE UP (ref 0–0)
NT-PROBNP SERPL-SCNC: 408 PG/ML — HIGH (ref 0–300)
PHOSPHATE SERPL-MCNC: 2.4 MG/DL — LOW (ref 2.5–4.5)
PLATELET # BLD AUTO: 284 K/UL — SIGNIFICANT CHANGE UP (ref 150–400)
POTASSIUM SERPL-MCNC: 3.8 MMOL/L — SIGNIFICANT CHANGE UP (ref 3.5–5.3)
POTASSIUM SERPL-SCNC: 3.8 MMOL/L — SIGNIFICANT CHANGE UP (ref 3.5–5.3)
PROT SERPL-MCNC: 6.8 G/DL — SIGNIFICANT CHANGE UP (ref 6–8.3)
RBC # BLD: 4.35 M/UL — SIGNIFICANT CHANGE UP (ref 3.8–5.2)
RBC # FLD: 13.3 % — SIGNIFICANT CHANGE UP (ref 10.3–14.5)
SODIUM SERPL-SCNC: 134 MMOL/L — LOW (ref 135–145)
TROPONIN T, HIGH SENSITIVITY RESULT: 24 NG/L — SIGNIFICANT CHANGE UP (ref 0–51)
WBC # BLD: 6.99 K/UL — SIGNIFICANT CHANGE UP (ref 3.8–10.5)
WBC # FLD AUTO: 6.99 K/UL — SIGNIFICANT CHANGE UP (ref 3.8–10.5)

## 2020-04-02 PROCEDURE — 93010 ELECTROCARDIOGRAM REPORT: CPT

## 2020-04-02 RX ORDER — ZINC SULFATE TAB 220 MG (50 MG ZINC EQUIVALENT) 220 (50 ZN) MG
220 TAB ORAL DAILY
Refills: 0 | Status: DISCONTINUED | OUTPATIENT
Start: 2020-04-02 | End: 2020-04-02

## 2020-04-02 RX ORDER — ASCORBIC ACID 60 MG
1500 TABLET,CHEWABLE ORAL DAILY
Refills: 0 | Status: DISCONTINUED | OUTPATIENT
Start: 2020-04-02 | End: 2020-04-02

## 2020-04-02 RX ORDER — FUROSEMIDE 40 MG
20 TABLET ORAL DAILY
Refills: 0 | Status: DISCONTINUED | OUTPATIENT
Start: 2020-04-02 | End: 2020-04-06

## 2020-04-02 RX ORDER — BENZOCAINE AND MENTHOL 5; 1 G/100ML; G/100ML
1 LIQUID ORAL
Refills: 0 | Status: DISCONTINUED | OUTPATIENT
Start: 2020-04-02 | End: 2020-04-21

## 2020-04-02 RX ORDER — THIAMINE MONONITRATE (VIT B1) 100 MG
400 TABLET ORAL DAILY
Refills: 0 | Status: DISCONTINUED | OUTPATIENT
Start: 2020-04-02 | End: 2020-04-02

## 2020-04-02 RX ORDER — SODIUM,POTASSIUM PHOSPHATES 278-250MG
1 POWDER IN PACKET (EA) ORAL
Refills: 0 | Status: COMPLETED | OUTPATIENT
Start: 2020-04-02 | End: 2020-04-03

## 2020-04-02 RX ORDER — ANAKINRA 100MG/0.67
100 SYRINGE (ML) SUBCUTANEOUS
Refills: 0 | Status: COMPLETED | OUTPATIENT
Start: 2020-04-02 | End: 2020-04-05

## 2020-04-02 RX ADMIN — Medication 100 MILLIGRAM(S): at 23:01

## 2020-04-02 RX ADMIN — ENOXAPARIN SODIUM 40 MILLIGRAM(S): 100 INJECTION SUBCUTANEOUS at 11:03

## 2020-04-02 RX ADMIN — Medication 20 MILLIGRAM(S): at 16:58

## 2020-04-02 RX ADMIN — Medication 300 MILLIGRAM(S): at 11:00

## 2020-04-02 RX ADMIN — LATANOPROST 1 DROP(S): 0.05 SOLUTION/ DROPS OPHTHALMIC; TOPICAL at 23:02

## 2020-04-02 RX ADMIN — CEFTRIAXONE 100 MILLIGRAM(S): 500 INJECTION, POWDER, FOR SOLUTION INTRAMUSCULAR; INTRAVENOUS at 05:30

## 2020-04-02 RX ADMIN — Medication 650 MILLIGRAM(S): at 06:05

## 2020-04-02 RX ADMIN — Medication 100 MILLIGRAM(S): at 17:00

## 2020-04-02 RX ADMIN — Medication 400 MILLIGRAM(S): at 10:59

## 2020-04-02 RX ADMIN — Medication 1 PACKET(S): at 17:01

## 2020-04-02 RX ADMIN — LISINOPRIL 20 MILLIGRAM(S): 2.5 TABLET ORAL at 06:12

## 2020-04-02 RX ADMIN — DORZOLAMIDE HYDROCHLORIDE TIMOLOL MALEATE 1 DROP(S): 20; 5 SOLUTION/ DROPS OPHTHALMIC at 17:03

## 2020-04-02 RX ADMIN — DORZOLAMIDE HYDROCHLORIDE TIMOLOL MALEATE 1 DROP(S): 20; 5 SOLUTION/ DROPS OPHTHALMIC at 06:12

## 2020-04-02 RX ADMIN — AZITHROMYCIN 250 MILLIGRAM(S): 500 TABLET, FILM COATED ORAL at 02:27

## 2020-04-02 RX ADMIN — Medication 200 MILLIGRAM(S): at 23:01

## 2020-04-02 RX ADMIN — Medication 400 MILLIGRAM(S): at 02:28

## 2020-04-02 NOTE — PROGRESS NOTE ADULT - PROBLEM SELECTOR PROBLEM 3
Acute heart failure, unspecified heart failure type Pneumonia of left lung due to infectious organism, unspecified part of lung

## 2020-04-02 NOTE — PROGRESS NOTE ADULT - SUBJECTIVE AND OBJECTIVE BOX
Contact Information:  Gerson Ware II, MD, MPH  PGY-1, Internal Medicine  Pager: [phone] ([facility]) /// [phone] ([facility])    KIMBERLY KUMAR, MRN-[id number]    Patient is a 69y old  Female who presents with a chief complaint of 69F p/w sob and fever (01 Apr 2020 03:20)      OVERNIGHT EVENTS:    SUBJECTIVE:    CONSTITUTIONAL: No weakness. No fatigue. No fever.  HEAD: No head trauma.   EYES: No vision changes.  ENT: No hearing changes or tinnitus. No ear pain. No changes in smell. No nasal congestion or discharge. No sore throat. No voice hoarseness.   NECK: No neck pain or stiffness. No lumps.  RESPIRATORY: No cough. No SOB. No wheezing. No hemoptysis.   CARDIOVASCULAR: No chest pain. No palpitations.   GASTROINTESTINAL: No dysphagia. No ABD pain. No distension. No constipation. No diarrhea. No pain with defecation. No hematemesis. No hematochezia or melena.  BACK: No back pain.  GENITOURINARY: No dysuria. No frequency or urgency. No hesitancy. No incontinence. No urinary retention. No suprapubic pain. No hematuria.  EXTREMITY: No swelling.  MUSCULOSKELETAL: No joint pain or swelling. No fractures. No stiffness.    SKIN: No rashes. No itching. No skin, hair, or nail changes.  NEUROLOGICAL: No weakness or paralysis. No lightheadedness or dizziness. No HA. No numbness or tingling.   PSYCHIATRIC: No depression.       OBJECTIVE:  Vital Signs Last 24 Hrs  T(C): 38.2 (02 Apr 2020 05:48), Max: 38.8 (01 Apr 2020 20:00)  T(F): 100.8 (02 Apr 2020 05:48), Max: 101.8 (01 Apr 2020 20:00)  HR: 87 (02 Apr 2020 05:48) (73 - 99)  BP: 145/82 (02 Apr 2020 05:48) (127/74 - 145/82)  BP(mean): --  RR: 20 (02 Apr 2020 05:48) (20 - 22)  SpO2: 95% (02 Apr 2020 05:48) (93% - 97%)  I&O's Summary    01 Apr 2020 07:01  -  02 Apr 2020 06:56  --------------------------------------------------------  IN: 300 mL / OUT: 300 mL / NET: 0 mL        MEDICATIONS  (STANDING):  allopurinol 300 milliGRAM(s) Oral daily  azithromycin  IVPB 500 milliGRAM(s) IV Intermittent every 24 hours  azithromycin  IVPB      cefTRIAXone   IVPB 1000 milliGRAM(s) IV Intermittent every 24 hours  dorzolamide 2%/timolol 0.5% Ophthalmic Solution 1 Drop(s) Both EYES two times a day  enoxaparin Injectable 40 milliGRAM(s) SubCutaneous daily  hydroxychloroquine   Oral   hydroxychloroquine 400 milliGRAM(s) Oral every 12 hours  latanoprost 0.005% Ophthalmic Solution 1 Drop(s) Both EYES at bedtime  lisinopril 20 milliGRAM(s) Oral daily    MEDICATIONS  (PRN):  acetaminophen   Tablet .. 650 milliGRAM(s) Oral every 6 hours PRN Temp greater or equal to 38C (100.4F)    Allergies    neomycin (Rash)    Intolerances        CONSTITUTIONAL: No acute distress. Awake and alert.  HEAD: No evidence of trauma. Structures WNL.  EYES: +PERRL. +EOMI. No scleral icterus. No conjunctival injection.  ENT: Moist oral mucosa. No erythema. No pharyngeal exudates.   NECK: Supple. Appropriate ROM. No stiffness. No masses or lymphadenopathy.  RESPIRATORY: CTAB. No wheezes, rales, or rhonchi. No accessory muscle use. No apparent respiratory distress.  CARDIOVASCULAR: +S1/S2. No audible S3/S4. Regular rate and rhythm. No murmurs, rubs, or gallops. 2+ radial pulses x b/l UE; 2+ DP pulses x b/l LE.   GASTROINTESTINAL: Soft, nontender, nondistended. +BS. No rebound or guarding.   BACK: No spinal or paraspinal tenderness. No CVA tenderness.  EXTREMITY: No LE swelling or edema. EXTs warm to touch.  MUSCULOSKELETAL: Spontaneous movement in all extremities.  DERMATOLOGICAL: No abnormal rashes or lesions.  NEUROLOGICAL: CN 2-12 grossly intact. No focal deficits. Sensation intact x 4EXT. A&Ox3 (oriented to person, place, and time).  PSYCHIATRIC: Appropriate affect.                            13.4   6.89  )-----------( 206      ( 01 Apr 2020 05:19 )             38.7     PT/INR - ( 01 Apr 2020 09:34 )   PT: 14.0 sec;   INR: 1.22 ratio         PTT - ( 01 Apr 2020 09:34 )  PTT:30.9 sec  04-01    134<L>  |  98  |  14  ----------------------------<  137<H>  4.2   |  22  |  0.72    Ca    8.8      01 Apr 2020 20:33  Phos  2.0     04-01  Mg     2.1     04-01    TPro  6.2  /  Alb  2.6<L>  /  TBili  0.2  /  DBili  x   /  AST  67<H>  /  ALT  48<H>  /  AlkPhos  74  04-01    CAPILLARY BLOOD GLUCOSE        LIVER FUNCTIONS - ( 01 Apr 2020 05:19 )  Alb: 2.6 g/dL / Pro: 6.2 g/dL / ALK PHOS: 74 U/L / ALT: 48 U/L / AST: 67 U/L / GGT: x           CARDIAC MARKERS ( 01 Apr 2020 05:19 )  x     / x     / 176 U/L / x     / 1.0 ng/mL  CARDIAC MARKERS ( 01 Apr 2020 03:06 )  x     / x     / 214 U/L / x     / 1.0 ng/mL              RADIOLOGY AND ADDITIONAL TESTS:    CONSULTANT NOTES REVIEWED:    CARE DISCUSSED WITH THE FOLLOWING CONSULTANTS/PROVIDERS: Contact Information:  Gerson Ware II, MD, MPH  PGY-1, Internal Medicine  Pager: [phone] ([facility]) /// [phone] ([facility])    KIMBERLY KUMAR, MRN-[id number]    Patient is a 69y old  Female who presents with a chief complaint of 69F p/w sob and fever (01 Apr 2020 03:20)      OVERNIGHT EVENTS: Admitted for SOB and fever. Found to be COVID+.    SUBJECTIVE: Patient evaluated at bedside, complaining of SOB and abnormal appearance of lower extremities. Otherwise denies HA, N/V, lightheadedness, dizziness, CP/ABD pain.    ROS  RESPIRATORY: +SOB      OBJECTIVE:  Vital Signs Last 24 Hrs  T(C): 38.2 (02 Apr 2020 05:48), Max: 38.8 (01 Apr 2020 20:00)  T(F): 100.8 (02 Apr 2020 05:48), Max: 101.8 (01 Apr 2020 20:00)  HR: 87 (02 Apr 2020 05:48) (73 - 99)  BP: 145/82 (02 Apr 2020 05:48) (127/74 - 145/82)  BP(mean): --  RR: 20 (02 Apr 2020 05:48) (20 - 22)  SpO2: 95% (02 Apr 2020 05:48) (93% - 97%)  I&O's Summary    01 Apr 2020 07:01  -  02 Apr 2020 06:56  --------------------------------------------------------  IN: 300 mL / OUT: 300 mL / NET: 0 mL        MEDICATIONS  (STANDING):  allopurinol 300 milliGRAM(s) Oral daily  azithromycin  IVPB 500 milliGRAM(s) IV Intermittent every 24 hours  azithromycin  IVPB      cefTRIAXone   IVPB 1000 milliGRAM(s) IV Intermittent every 24 hours  dorzolamide 2%/timolol 0.5% Ophthalmic Solution 1 Drop(s) Both EYES two times a day  enoxaparin Injectable 40 milliGRAM(s) SubCutaneous daily  hydroxychloroquine   Oral   hydroxychloroquine 400 milliGRAM(s) Oral every 12 hours  latanoprost 0.005% Ophthalmic Solution 1 Drop(s) Both EYES at bedtime  lisinopril 20 milliGRAM(s) Oral daily    MEDICATIONS  (PRN):  acetaminophen   Tablet .. 650 milliGRAM(s) Oral every 6 hours PRN Temp greater or equal to 38C (100.4F)    Allergies    neomycin (Rash)    Intolerances        CONSTITUTIONAL: Slight distress due to SOB.  EYES: No scleral icterus. No conjunctival injection.  ENT: Moist oral mucosa.   RESPIRATORY: Crackles audible in b/l posterior lung fields, louder on the L side.  CARDIOVASCULAR: +S1/S2. No audible S3/S4. Regular rate and rhythm.   GASTROINTESTINAL: Soft, nontender, nondistended. +BS. No rebound or guarding.   EXTREMITY: 1+ pitting edema b/l  MUSCULOSKELETAL: Spontaneous movement in all extremities.  NEUROLOGICAL: Nonfocal.                            13.4   6.89  )-----------( 206      ( 01 Apr 2020 05:19 )             38.7     PT/INR - ( 01 Apr 2020 09:34 )   PT: 14.0 sec;   INR: 1.22 ratio         PTT - ( 01 Apr 2020 09:34 )  PTT:30.9 sec  04-01    134<L>  |  98  |  14  ----------------------------<  137<H>  4.2   |  22  |  0.72    Ca    8.8      01 Apr 2020 20:33  Phos  2.0     04-01  Mg     2.1     04-01    TPro  6.2  /  Alb  2.6<L>  /  TBili  0.2  /  DBili  x   /  AST  67<H>  /  ALT  48<H>  /  AlkPhos  74  04-01    CAPILLARY BLOOD GLUCOSE        LIVER FUNCTIONS - ( 01 Apr 2020 05:19 )  Alb: 2.6 g/dL / Pro: 6.2 g/dL / ALK PHOS: 74 U/L / ALT: 48 U/L / AST: 67 U/L / GGT: x           CARDIAC MARKERS ( 01 Apr 2020 05:19 )  x     / x     / 176 U/L / x     / 1.0 ng/mL  CARDIAC MARKERS ( 01 Apr 2020 03:06 )  x     / x     / 214 U/L / x     / 1.0 ng/mL              RADIOLOGY AND ADDITIONAL TESTS:    CONSULTANT NOTES REVIEWED:    CARE DISCUSSED WITH THE FOLLOWING CONSULTANTS/PROVIDERS:

## 2020-04-02 NOTE — PROGRESS NOTE ADULT - PROBLEM SELECTOR PLAN 1
- COVID19 PCR collected and is PENDING  - Chest imaging consistent with left-sided pneumonia  - Continue with NRB and avoid BiLevel/High Flow Nasal Cannula per institution policy given risk of aerosolization  - empiric antibiotics for bacterial infection given due to clinical status  - obtain cbc with diff, CMP w/ lytes, coag,procalcitonin, ESR, CRP, LDH, Ferritin,   - monitor EKG for QTc prolongation  - isolation precautions - Presented with fever, chills, dry cough and worsening SOB  - Found to be COVID+  - QTc 455, initiated on plaquenil (4/2 - ) and azithromycin (4/1 - )  - Started on thiamine, vitamin C, zinc  - Will call to acquire OT patch for daily EKG monitoring

## 2020-04-02 NOTE — PROGRESS NOTE ADULT - SUBJECTIVE AND OBJECTIVE BOX
68 yo F with PMhx of HTN and prediabetes presents to the ED with complaints of fever, chills, dry cough and worsening SOB. Patient reports that she has been feeling febrile for the last 2 weeks. She has intermittent chills and dry cough. She feels chest pressure when she coughs. Her SOB has been worsening. She becomes short of breath when she walks to the bathroom from the bed. She also reports to have orthopnea and LE edema. She has been sleeping in the chair for the last couple of days. She denies any recent travel. She went to [place] before her symptoms and thinks that she was exposed there. She reports to have HTN and prediabetes. She had C-sectionsx2 in the past. No family hx of heart or lung disease. Lives with her . Able to take care of ADLs. No hx of smoking or chronic alcohol consumptions. Allergic to neuomycin-develops rash and swelling. Patient with increasing shortness of breath. Patient wishes to be intubated as needed.     MEDICATIONS  (STANDING):  allopurinol 300 milliGRAM(s) Oral daily  anakinra Injectable 100 milliGRAM(s) SubCutaneous <User Schedule>  azithromycin  IVPB 500 milliGRAM(s) IV Intermittent every 24 hours  azithromycin  IVPB      cefTRIAXone   IVPB 1000 milliGRAM(s) IV Intermittent every 24 hours  dorzolamide 2%/timolol 0.5% Ophthalmic Solution 1 Drop(s) Both EYES two times a day  enoxaparin Injectable 40 milliGRAM(s) SubCutaneous daily  furosemide   Injectable 20 milliGRAM(s) IV Push daily  hydroxychloroquine   Oral   latanoprost 0.005% Ophthalmic Solution 1 Drop(s) Both EYES at bedtime  lisinopril 20 milliGRAM(s) Oral daily  potassium phosphate / sodium phosphate powder 1 Packet(s) Oral two times a day    MEDICATIONS  (PRN):  acetaminophen   Tablet .. 650 milliGRAM(s) Oral every 6 hours PRN Temp greater or equal to 38C (100.4F)  benzocaine 15 mG/menthol 3.6 mG (Sugar-Free) Lozenge 1 Lozenge Oral four times a day PRN Cough  benzonatate 100 milliGRAM(s) Oral three times a day PRN Cough  guaiFENesin   Syrup  (Sugar-Free) 200 milliGRAM(s) Oral four times a day PRN Cough          VITALS:   T(C): 38.1 (04-02-20 @ 20:03), Max: 38.2 (04-02-20 @ 05:48)  HR: 92 (04-02-20 @ 20:03) (87 - 99)  BP: 144/88 (04-02-20 @ 20:03) (142/81 - 151/84)  RR: 20 (04-02-20 @ 20:03) (20 - 21)  SpO2: 95% (04-02-20 @ 20:03) (90% - 95%)  Wt(kg): --      PHYSICAL EXAM:  GENERAL: NAD, well nourished and conversant  HEAD:  Atraumatic  EYES: EOM, PERRLA, conjunctiva pink and sclera white  ENT: No tonsillar erythema, exudates, or enlargement, moist mucous membranes, good dentition, no lesions  NECK: Supple, No JVD, normal thyroid, carotids with normal upstrokes and no bruits  CHEST/LUNG: decreased BS with soft rhonchi  HEART: Regular rate and rhythm, No murmurs, rubs, or gallops  ABDOMEN: Soft, nondistended, no masses, guarding, tenderness or rebound, bowel sounds present  EXTREMITIES:  2+ Peripheral Pulses, No clubbing, cyanosis, or edema.   LYMPH: No lymphadenopathy noted  SKIN: No rashes or lesions  NERVOUS SYSTEM:  Alert & Oriented X3, normal cognitive function. Motor Strength 5/5 right upper and right lower.  5/5 left upper and left lower extremities, DTRs 2+ intact and symmetric    LABS:    CARDIAC MARKERS ( 02 Apr 2020 06:49 )  x     / x     / 228 U/L / x     / 1.6 ng/mL  CARDIAC MARKERS ( 01 Apr 2020 05:19 )  x     / x     / 176 U/L / x     / 1.0 ng/mL  CARDIAC MARKERS ( 01 Apr 2020 03:06 )  x     / x     / 214 U/L / x     / 1.0 ng/mL      CBC Full  -  ( 02 Apr 2020 06:53 )  WBC Count : 6.99 K/uL  RBC Count : 4.35 M/uL  Hemoglobin : 13.4 g/dL  Hematocrit : 39.9 %  Platelet Count - Automated : 284 K/uL  Mean Cell Volume : 91.7 fl  Mean Cell Hemoglobin : 30.8 pg  Mean Cell Hemoglobin Concentration : 33.6 gm/dL  Auto Neutrophil # : x  Auto Lymphocyte # : x  Auto Monocyte # : x  Auto Eosinophil # : x  Auto Basophil # : x  Auto Neutrophil % : x  Auto Lymphocyte % : x  Auto Monocyte % : x  Auto Eosinophil % : x  Auto Basophil % : x    04-02    134<L>  |  95<L>  |  13  ----------------------------<  131<H>  3.8   |  24  |  0.69    Ca    8.9      02 Apr 2020 06:49  Phos  2.4     04-02  Mg     2.0     04-02    TPro  6.8  /  Alb  3.0<L>  /  TBili  0.3  /  DBili  x   /  AST  171<H>  /  ALT  101<H>  /  AlkPhos  118  04-02    LIVER FUNCTIONS - ( 02 Apr 2020 06:49 )  Alb: 3.0 g/dL / Pro: 6.8 g/dL / ALK PHOS: 118 U/L / ALT: 101 U/L / AST: 171 U/L / GGT: x           PT/INR - ( 01 Apr 2020 09:34 )   PT: 14.0 sec;   INR: 1.22 ratio         PTT - ( 01 Apr 2020 09:34 )  PTT:30.9 sec    CAPILLARY BLOOD GLUCOSE          RADIOLOGY & ADDITIONAL TESTS:

## 2020-04-02 NOTE — PROGRESS NOTE ADULT - ASSESSMENT
68 yo F with PMhx of HTN and prediabetes presents to the ED with complaints of fever, chills, dry cough and worsening SOB admit to medicine for acute respiratory failure with hypoxia from pneumonia with concern for COVID19

## 2020-04-02 NOTE — PROGRESS NOTE ADULT - PROBLEM SELECTOR PLAN 6
Transitions of Care Status:  1.  Name of PCP: Dr. Staples  2.  PCP Contacted on Admission: [ ] Y    [X ] N    3.  PCP contacted at Discharge: [ ] Y    [ ] N    [ ] N/A  4.  Post-Discharge Appointment Date and Location:  5.  Summary of Handoff given to PCP: - c/w lisinopril 20 (Per WHO guidelines there is no recommendation to discontinue ace-inhibitor medication)  - HCTZ/Triamterene is dosed every other day outpatient, will hold for now

## 2020-04-02 NOTE — PROGRESS NOTE ADULT - PROBLEM SELECTOR PLAN 2
see above - 2/2 COVID+ PNA vs acute HF  - CXR - hazy opacification of L lung compatible with PNA  - Currenlty on 15L NRB + 15L NC; avoid BiLevel/High Flow Nasal Cannula per institution policy given risk of aerosolization  - Empiric antibiotics for bacterial infection given due to clinical status  - Monitor daily COVID labs  - MCOT patch to monitor EKG daily  - Isolation precautions

## 2020-04-02 NOTE — PROGRESS NOTE ADULT - PROBLEM SELECTOR PLAN 4
monitor w/ cmp  supplemented in the ED - history of orthopnea and lower extremity swelling which is new. No reported hx of heart failure per discussion with family  - no charted chest pain. EKG does NOT demonstrate STEMI.   - F/u Troponin, CK/CKMB, repeat w/AM labs for delta to evaluate for development of COVID myocarditis   - F/u BNP  - Likely to require TTE to evaluate for cardiac pathology

## 2020-04-02 NOTE — PROGRESS NOTE ADULT - PROBLEM SELECTOR PLAN 5
c/w lisinopril home dosing (Per WHO guidelines there is no recommendation to discontinue ace-inhibitor medication)  - HCTZ/Triamterene is dosed every other day outpatient, will hold for now - K WNL  - Trend BMP

## 2020-04-02 NOTE — PROGRESS NOTE ADULT - PROBLEM SELECTOR PROBLEM 2
Pneumonia of left lung due to infectious organism, unspecified part of lung Acute respiratory failure with hypoxia

## 2020-04-02 NOTE — PROGRESS NOTE ADULT - PROBLEM SELECTOR PLAN 3
history of orthopnea and lower extremity swelling which is new. No reported hx of heart failure per discussion with family  - no charted chest pain. EKG does NOT demonstrate STEMI. send troponin, ck, ckmb and repeat w/ am labs for delta to evaluate for development of myocarditis (given case reports in COVID19 patients), obtain BNP. pending COVID eval should have TTE (will need hospitalist during the day to determine if HF worsening and requires inpatient,; for now plan to complete as outpatient) - see above

## 2020-04-02 NOTE — PROGRESS NOTE ADULT - PROBLEM SELECTOR PLAN 1
Patient covid Positive  will discuss starting anakinra with id  will start steroids  continue Plaquenil  Patient with

## 2020-04-03 LAB
ALBUMIN SERPL ELPH-MCNC: 3.1 G/DL — LOW (ref 3.3–5)
ALP SERPL-CCNC: 147 U/L — HIGH (ref 40–120)
ALT FLD-CCNC: 195 U/L — HIGH (ref 10–45)
ANION GAP SERPL CALC-SCNC: 16 MMOL/L — SIGNIFICANT CHANGE UP (ref 5–17)
AST SERPL-CCNC: 278 U/L — HIGH (ref 10–40)
BASOPHILS # BLD AUTO: 0 K/UL — SIGNIFICANT CHANGE UP (ref 0–0.2)
BASOPHILS NFR BLD AUTO: 0 % — SIGNIFICANT CHANGE UP (ref 0–2)
BILIRUB SERPL-MCNC: 0.4 MG/DL — SIGNIFICANT CHANGE UP (ref 0.2–1.2)
BUN SERPL-MCNC: 16 MG/DL — SIGNIFICANT CHANGE UP (ref 7–23)
CALCIUM SERPL-MCNC: 9.4 MG/DL — SIGNIFICANT CHANGE UP (ref 8.4–10.5)
CHLORIDE SERPL-SCNC: 94 MMOL/L — LOW (ref 96–108)
CK SERPL-CCNC: 177 U/L — HIGH (ref 25–170)
CO2 SERPL-SCNC: 27 MMOL/L — SIGNIFICANT CHANGE UP (ref 22–31)
CREAT SERPL-MCNC: 0.68 MG/DL — SIGNIFICANT CHANGE UP (ref 0.5–1.3)
CRP SERPL-MCNC: 23.13 MG/DL — HIGH (ref 0–0.4)
EOSINOPHIL # BLD AUTO: 0 K/UL — SIGNIFICANT CHANGE UP (ref 0–0.5)
EOSINOPHIL NFR BLD AUTO: 0 % — SIGNIFICANT CHANGE UP (ref 0–6)
FERRITIN SERPL-MCNC: 7292 NG/ML — HIGH (ref 15–150)
GLUCOSE SERPL-MCNC: 112 MG/DL — HIGH (ref 70–99)
HCT VFR BLD CALC: 42.8 % — SIGNIFICANT CHANGE UP (ref 34.5–45)
HGB BLD-MCNC: 14.1 G/DL — SIGNIFICANT CHANGE UP (ref 11.5–15.5)
LYMPHOCYTES # BLD AUTO: 0.61 K/UL — LOW (ref 1–3.3)
LYMPHOCYTES # BLD AUTO: 9 % — LOW (ref 13–44)
MAGNESIUM SERPL-MCNC: 2 MG/DL — SIGNIFICANT CHANGE UP (ref 1.6–2.6)
MCHC RBC-ENTMCNC: 30.5 PG — SIGNIFICANT CHANGE UP (ref 27–34)
MCHC RBC-ENTMCNC: 32.9 GM/DL — SIGNIFICANT CHANGE UP (ref 32–36)
MCV RBC AUTO: 92.4 FL — SIGNIFICANT CHANGE UP (ref 80–100)
MONOCYTES # BLD AUTO: 0.81 K/UL — SIGNIFICANT CHANGE UP (ref 0–0.9)
MONOCYTES NFR BLD AUTO: 12 % — SIGNIFICANT CHANGE UP (ref 2–14)
NEUTROPHILS # BLD AUTO: 5.36 K/UL — SIGNIFICANT CHANGE UP (ref 1.8–7.4)
NEUTROPHILS NFR BLD AUTO: 77 % — SIGNIFICANT CHANGE UP (ref 43–77)
PHOSPHATE SERPL-MCNC: 3 MG/DL — SIGNIFICANT CHANGE UP (ref 2.5–4.5)
PLATELET # BLD AUTO: 343 K/UL — SIGNIFICANT CHANGE UP (ref 150–400)
POTASSIUM SERPL-MCNC: 3.8 MMOL/L — SIGNIFICANT CHANGE UP (ref 3.5–5.3)
POTASSIUM SERPL-SCNC: 3.8 MMOL/L — SIGNIFICANT CHANGE UP (ref 3.5–5.3)
PROT SERPL-MCNC: 7 G/DL — SIGNIFICANT CHANGE UP (ref 6–8.3)
RBC # BLD: 4.63 M/UL — SIGNIFICANT CHANGE UP (ref 3.8–5.2)
RBC # FLD: 13.5 % — SIGNIFICANT CHANGE UP (ref 10.3–14.5)
SODIUM SERPL-SCNC: 137 MMOL/L — SIGNIFICANT CHANGE UP (ref 135–145)
TROPONIN T, HIGH SENSITIVITY RESULT: 17 NG/L — SIGNIFICANT CHANGE UP (ref 0–51)
WBC # BLD: 6.79 K/UL — SIGNIFICANT CHANGE UP (ref 3.8–10.5)
WBC # FLD AUTO: 6.79 K/UL — SIGNIFICANT CHANGE UP (ref 3.8–10.5)

## 2020-04-03 RX ORDER — FUROSEMIDE 40 MG
20 TABLET ORAL ONCE
Refills: 0 | Status: COMPLETED | OUTPATIENT
Start: 2020-04-03 | End: 2020-04-03

## 2020-04-03 RX ADMIN — LATANOPROST 1 DROP(S): 0.05 SOLUTION/ DROPS OPHTHALMIC; TOPICAL at 22:04

## 2020-04-03 RX ADMIN — Medication 100 MILLIGRAM(S): at 23:30

## 2020-04-03 RX ADMIN — CEFTRIAXONE 100 MILLIGRAM(S): 500 INJECTION, POWDER, FOR SOLUTION INTRAMUSCULAR; INTRAVENOUS at 02:01

## 2020-04-03 RX ADMIN — Medication 100 MILLIGRAM(S): at 13:03

## 2020-04-03 RX ADMIN — AZITHROMYCIN 250 MILLIGRAM(S): 500 TABLET, FILM COATED ORAL at 02:42

## 2020-04-03 RX ADMIN — Medication 20 MILLIGRAM(S): at 16:37

## 2020-04-03 RX ADMIN — ENOXAPARIN SODIUM 40 MILLIGRAM(S): 100 INJECTION SUBCUTANEOUS at 13:03

## 2020-04-03 RX ADMIN — Medication 40 MILLIGRAM(S): at 16:37

## 2020-04-03 RX ADMIN — DORZOLAMIDE HYDROCHLORIDE TIMOLOL MALEATE 1 DROP(S): 20; 5 SOLUTION/ DROPS OPHTHALMIC at 16:56

## 2020-04-03 RX ADMIN — Medication 20 MILLIGRAM(S): at 05:19

## 2020-04-03 RX ADMIN — LISINOPRIL 20 MILLIGRAM(S): 2.5 TABLET ORAL at 05:18

## 2020-04-03 RX ADMIN — Medication 300 MILLIGRAM(S): at 13:03

## 2020-04-03 RX ADMIN — Medication 1 PACKET(S): at 05:18

## 2020-04-03 RX ADMIN — Medication 100 MILLIGRAM(S): at 16:35

## 2020-04-03 RX ADMIN — Medication 200 MILLIGRAM(S): at 13:03

## 2020-04-03 RX ADMIN — Medication 102 MILLIGRAM(S): at 05:18

## 2020-04-03 RX ADMIN — Medication 100 MILLIGRAM(S): at 05:18

## 2020-04-03 RX ADMIN — DORZOLAMIDE HYDROCHLORIDE TIMOLOL MALEATE 1 DROP(S): 20; 5 SOLUTION/ DROPS OPHTHALMIC at 05:20

## 2020-04-03 RX ADMIN — Medication 200 MILLIGRAM(S): at 23:30

## 2020-04-03 NOTE — PROGRESS NOTE ADULT - ASSESSMENT
68 yo F with PMhx of HTN and prediabetes presents to the ED with complaints of fever, chills, dry cough and worsening SOB admit to medicine for acute hypoxic respiratory failure 2/2 COVID-19 pneumonia.

## 2020-04-03 NOTE — PROGRESS NOTE ADULT - PROBLEM SELECTOR PLAN 6
- c/w lisinopril 20 (Per WHO guidelines there is no recommendation to discontinue ace-inhibitor medication)  - HCTZ/Triamterene is dosed every other day outpatient, will hold for now

## 2020-04-03 NOTE — PROGRESS NOTE ADULT - PROBLEM SELECTOR PLAN 4
will continue lasix 20 daily   given an extra 20 mg today  will continue to add diuretics as tolerated

## 2020-04-03 NOTE — DIETITIAN INITIAL EVALUATION ADULT. - PHYSICAL APPEARANCE
other (specify)/Unable to conduct nutrition-focused physical exam at this time due to limited contact restrictions related to pt's medical condition and isolation precautions. Ht: 63 inches (160.02 cm) Wt: 149.6 pounds (68 kg)  BMI: 26.6 kg/m2  IBW: 115 pounds +/-10% %IBW: 130%  Skin: no pressure injuries per flowsheets   Edema: no edema per flowsheets

## 2020-04-03 NOTE — DIETITIAN INITIAL EVALUATION ADULT. - OTHER INFO
Unable to conduct a face-to-face interview at this time due to limited contact restrictions related to pt's medical condition and isolation precautions. Unable to reach pt via phone x multiple attempts.      unsure of pt's intake in-house. As per flowsheets, pt consumed 75% of lunch yesterday (4/2). No GI distress reported. Last BM yesterday (4/2) as per flowsheets. No difficulties chewing or swallowing reported. NKFA.     reports pt with poor appetite and intake x 3-4 days PTA. During this time, pt consuming mostly soup and Jell-O.  unsure of recent weight changes, reports -157 pounds (noted stated dosing weight 149.6 pounds - ? accuracy of reported weights). No vitamin/mineral supplementation reported.     Pt's  with no food preferences for pt or nutrition-related questions or concerns at this time. Made aware RD remains available.

## 2020-04-03 NOTE — PROGRESS NOTE ADULT - PROBLEM SELECTOR PLAN 4
- history of orthopnea and lower extremity swelling which is new. No reported hx of heart failure per discussion with family  - no charted chest pain. EKG does NOT demonstrate STEMI.   - F/u Troponin, CK/CKMB, repeat w/AM labs for delta to evaluate for development of COVID myocarditis   - F/u BNP  - Likely to require TTE to evaluate for cardiac pathology - history of orthopnea and lower extremity swelling which is new. No reported hx of heart failure per discussion with family  - no charted chest pain. EKG does NOT demonstrate STEMI.   - F/u Troponin, CK/CKMB, repeat w/AM labs for delta to evaluate for development of COVID myocarditis   -pBNP not impressive at 400  -Likely to require TTE outpt to evaluate for cardiac pathology

## 2020-04-03 NOTE — DIETITIAN INITIAL EVALUATION ADULT. - REASON INDICATOR FOR ASSESSMENT
Pt seen for length of stay assessment. Source: comprehensive chart review, patient's  via phone ([facility] [phone]). Pt is a 70 yo female with PMH of HTN and prediabetes, who presented with fever, chills, dry cough and worsening SOB, admitted 3/31 for acute respiratory failure with hypoxia from pneumonia, COVID-19+.

## 2020-04-03 NOTE — PROGRESS NOTE ADULT - ASSESSMENT
68 yo F with PMhx of HTN and prediabetes presents to the ED with complaints of fever, chills, dry cough and worsening SOB admit to medicine for acute respiratory failure with hypoxia from pneumonia with concern for COVID19 68 yo F with PMhx of HTN and prediabetes presents to the ED with complaints of fever, chills, dry cough and worsening SOB admit to medicine for acute hypoxic respiratory failure 2/2 COVID-19 pneumonia.

## 2020-04-03 NOTE — PROGRESS NOTE ADULT - PROBLEM SELECTOR PLAN 1
- Presented with fever, chills, dry cough and worsening SOB  - Found to be COVID+  - QTc 455, initiated on plaquenil (4/2 - ) and azithromycin (4/1 - )  - Started on thiamine, vitamin C, zinc  - Will call to acquire OT patch for daily EKG monitoring - Presented with fever, chills, dry cough and worsening SOB  - Found to be COVID+  - QTc 455 (4/2), initiated on plaquenil (4/2-) and azithromycin (4/1-)  - Will call to acquire MCOT patch for daily EKG monitoring - Presented with fever, chills, dry cough and worsening SOB, Found to be COVID+  - QTc 455 (4/2), initiated on plaquenil (4/2-) and azithromycin (4/1-)  - Will call to acquire MCOT patch for daily EKG monitoring

## 2020-04-03 NOTE — PROGRESS NOTE ADULT - SUBJECTIVE AND OBJECTIVE BOX
68 yo F with PMhx of HTN and prediabetes presents to the ED with complaints of fever, chills, dry cough and worsening SOB. Patient reports that she has been feeling febrile for the last 2 weeks. She has intermittent chills and dry cough. She feels chest pressure when she coughs. Her SOB has been worsening. She becomes short of breath when she walks to the bathroom from the bed. She also reports to have orthopnea and LE edema. She has been sleeping in the chair for the last couple of days. She denies any recent travel. She went to [place] before her symptoms and thinks that she was exposed there. She reports to have HTN and prediabetes. She had C-sectionsx2 in the past. No family hx of heart or lung disease. Lives with her . Able to take care of ADLs. No hx of smoking or chronic alcohol consumptions. Allergic to neuomycin-develops rash and swelling. Patient with increasing shortness of breath. Patient wishes to be intubated as needed. still short of breath but  able to converse.     MEDICATIONS  (STANDING):  allopurinol 300 milliGRAM(s) Oral daily  anakinra Injectable 100 milliGRAM(s) SubCutaneous <User Schedule>  azithromycin  IVPB 500 milliGRAM(s) IV Intermittent every 24 hours  azithromycin  IVPB      cefTRIAXone   IVPB 1000 milliGRAM(s) IV Intermittent every 24 hours  dorzolamide 2%/timolol 0.5% Ophthalmic Solution 1 Drop(s) Both EYES two times a day  enoxaparin Injectable 40 milliGRAM(s) SubCutaneous daily  furosemide   Injectable 20 milliGRAM(s) IV Push daily  hydroxychloroquine   Oral   hydroxychloroquine 200 milliGRAM(s) Oral every 12 hours  latanoprost 0.005% Ophthalmic Solution 1 Drop(s) Both EYES at bedtime  lisinopril 20 milliGRAM(s) Oral daily  methylPREDNISolone sodium succinate Injectable 40 milliGRAM(s) IV Push two times a day    MEDICATIONS  (PRN):  acetaminophen   Tablet .. 650 milliGRAM(s) Oral every 6 hours PRN Temp greater or equal to 38C (100.4F)  benzocaine 15 mG/menthol 3.6 mG (Sugar-Free) Lozenge 1 Lozenge Oral four times a day PRN Cough  benzonatate 100 milliGRAM(s) Oral three times a day PRN Cough  guaiFENesin   Syrup  (Sugar-Free) 200 milliGRAM(s) Oral four times a day PRN Cough          VITALS:   T(C): 36.5 (04-03-20 @ 20:03), Max: 36.8 (04-03-20 @ 02:43)  HR: 75 (04-03-20 @ 20:03) (75 - 88)  BP: 152/84 (04-03-20 @ 20:03) (128/91 - 152/84)  RR: 20 (04-03-20 @ 20:03) (19 - 20)  SpO2: 90% (04-03-20 @ 20:03) (90% - 94%)  Wt(kg): --      PHYSICAL EXAM:  GENERAL: NAD, well nourished and conversant  HEAD:  Atraumatic  EYES: EOM, PERRLA, conjunctiva pink and sclera white  ENT: No tonsillar erythema, exudates, or enlargement, moist mucous membranes, good dentition, no lesions  NECK: Supple, No JVD, normal thyroid, carotids with normal upstrokes and no bruits  CHEST/LUNG: decreased BS with soft rhonchi  HEART: Regular rate and rhythm, No murmurs, rubs, or gallops  ABDOMEN: Soft, nondistended, no masses, guarding, tenderness or rebound, bowel sounds present  EXTREMITIES:  2+ Peripheral Pulses, No clubbing, cyanosis, or edema.   LYMPH: No lymphadenopathy noted  SKIN: No rashes or lesions  NERVOUS SYSTEM:  Alert & Oriented X3, normal cognitive function. Motor Strength 5/5 right upper and right lower.  5/5 left upper and left lower extremities, DTRs 2+ intact and symmetric  LABS:    CARDIAC MARKERS ( 03 Apr 2020 07:41 )  x     / x     / 177 U/L / x     / x      CARDIAC MARKERS ( 02 Apr 2020 06:49 )  x     / x     / 228 U/L / x     / 1.6 ng/mL      CBC Full  -  ( 03 Apr 2020 07:41 )  WBC Count : 6.79 K/uL  RBC Count : 4.63 M/uL  Hemoglobin : 14.1 g/dL  Hematocrit : 42.8 %  Platelet Count - Automated : 343 K/uL  Mean Cell Volume : 92.4 fl  Mean Cell Hemoglobin : 30.5 pg  Mean Cell Hemoglobin Concentration : 32.9 gm/dL  Auto Neutrophil # : 5.36 K/uL  Auto Lymphocyte # : 0.61 K/uL  Auto Monocyte # : 0.81 K/uL  Auto Eosinophil # : 0.00 K/uL  Auto Basophil # : 0.00 K/uL  Auto Neutrophil % : 77.0 %  Auto Lymphocyte % : 9.0 %  Auto Monocyte % : 12.0 %  Auto Eosinophil % : 0.0 %  Auto Basophil % : 0.0 %    04-03    137  |  94<L>  |  16  ----------------------------<  112<H>  3.8   |  27  |  0.68    Ca    9.4      03 Apr 2020 07:41  Phos  3.0     04-03  Mg     2.0     04-03    TPro  7.0  /  Alb  3.1<L>  /  TBili  0.4  /  DBili  x   /  AST  278<H>  /  ALT  195<H>  /  AlkPhos  147<H>  04-03    LIVER FUNCTIONS - ( 03 Apr 2020 07:41 )  Alb: 3.1 g/dL / Pro: 7.0 g/dL / ALK PHOS: 147 U/L / ALT: 195 U/L / AST: 278 U/L / GGT: x               CAPILLARY BLOOD GLUCOSE          RADIOLOGY & ADDITIONAL TESTS:

## 2020-04-03 NOTE — PROGRESS NOTE ADULT - SUBJECTIVE AND OBJECTIVE BOX
Internal Medicine Progress Note    =======================================================  CONTACT INFO  Tiara Grayson M.D., PGY-3  Pager: [phone] ([facility]) / [phone] ([facility])    Mon-Fri: pager covered by day team 7am-7pm  After 7:00PM on weekdays and 12:00PM on weekends, page [phone]  =======================================================      Patient is a 69y old  Female who presents with a chief complaint of 69F p/w sob and fever (02 Apr 2020 21:31)      SUBJECTIVE / OVERNIGHT EVENTS:    MEDICATIONS  (STANDING):  allopurinol 300 milliGRAM(s) Oral daily  anakinra Injectable 100 milliGRAM(s) SubCutaneous <User Schedule>  azithromycin  IVPB 500 milliGRAM(s) IV Intermittent every 24 hours  azithromycin  IVPB      cefTRIAXone   IVPB 1000 milliGRAM(s) IV Intermittent every 24 hours  dorzolamide 2%/timolol 0.5% Ophthalmic Solution 1 Drop(s) Both EYES two times a day  enoxaparin Injectable 40 milliGRAM(s) SubCutaneous daily  furosemide   Injectable 20 milliGRAM(s) IV Push daily  hydroxychloroquine   Oral   hydroxychloroquine 200 milliGRAM(s) Oral every 12 hours  latanoprost 0.005% Ophthalmic Solution 1 Drop(s) Both EYES at bedtime  lisinopril 20 milliGRAM(s) Oral daily  methylPREDNISolone sodium succinate IVPB 40 milliGRAM(s) IV Intermittent two times a day    MEDICATIONS  (PRN):  acetaminophen   Tablet .. 650 milliGRAM(s) Oral every 6 hours PRN Temp greater or equal to 38C (100.4F)  benzocaine 15 mG/menthol 3.6 mG (Sugar-Free) Lozenge 1 Lozenge Oral four times a day PRN Cough  benzonatate 100 milliGRAM(s) Oral three times a day PRN Cough  guaiFENesin   Syrup  (Sugar-Free) 200 milliGRAM(s) Oral four times a day PRN Cough    Vital Signs Last 24 Hrs  T(C): 36.4 (03 Apr 2020 05:16), Max: 38.1 (02 Apr 2020 20:03)  T(F): 97.5 (03 Apr 2020 05:16), Max: 100.5 (02 Apr 2020 20:03)  HR: 88 (03 Apr 2020 05:16) (88 - 93)  BP: 149/92 (03 Apr 2020 05:16) (144/88 - 151/84)  BP(mean): --  RR: 20 (03 Apr 2020 05:16) (20 - 20)  SpO2: 92% (03 Apr 2020 05:16) (90% - 95%)    CAPILLARY BLOOD GLUCOSE        I&O's Summary      PHYSICAL EXAM  GENERAL: NAD  HEAD:  Atraumatic  EYES: EOMI, PERRLA, conjunctiva and sclera clear  NECK: Supple, No JVD  CHEST/LUNG: Clear to auscultation bilaterally; No wheeze  HEART: Regular rate and rhythm; No murmurs, rubs, or gallops  ABDOMEN: Soft, Nontender, Nondistended; Bowel sounds present  EXTREMITIES:  2+ Peripheral Pulses, No clubbing, cyanosis, or edema  NEURO/PSYCH: AAOx3, nonfocal  SKIN: No rashes or lesions      LABS:                        13.4   6.99  )-----------( 284      ( 02 Apr 2020 06:53 )             39.9     Hemoglobin: 13.4 g/dL (04-02 @ 06:53)  Hemoglobin: 13.4 g/dL (04-01 @ 05:19)  Hemoglobin: 13.6 g/dL (03-31 @ 21:52)    CBC Full  -  ( 02 Apr 2020 06:53 )  WBC Count : 6.99 K/uL  RBC Count : 4.35 M/uL  Hemoglobin : 13.4 g/dL  Hematocrit : 39.9 %  Platelet Count - Automated : 284 K/uL  Mean Cell Volume : 91.7 fl  Mean Cell Hemoglobin : 30.8 pg  Mean Cell Hemoglobin Concentration : 33.6 gm/dL  Auto Neutrophil # : x  Auto Lymphocyte # : x  Auto Monocyte # : x  Auto Eosinophil # : x  Auto Basophil # : x  Auto Neutrophil % : x  Auto Lymphocyte % : x  Auto Monocyte % : x  Auto Eosinophil % : x  Auto Basophil % : x    04-02    134<L>  |  95<L>  |  13  ----------------------------<  131<H>  3.8   |  24  |  0.69    Ca    8.9      02 Apr 2020 06:49  Phos  2.4     04-02  Mg     2.0     04-02    TPro  6.8  /  Alb  3.0<L>  /  TBili  0.3  /  DBili  x   /  AST  171<H>  /  ALT  101<H>  /  AlkPhos  118  04-02    Creatinine Trend: 0.69<--, 0.72<--, 0.71<--, 0.76<--  LIVER FUNCTIONS - ( 02 Apr 2020 06:49 )  Alb: 3.0 g/dL / Pro: 6.8 g/dL / ALK PHOS: 118 U/L / ALT: 101 U/L / AST: 171 U/L / GGT: x           PT/INR - ( 01 Apr 2020 09:34 )   PT: 14.0 sec;   INR: 1.22 ratio         PTT - ( 01 Apr 2020 09:34 )  PTT:30.9 sec    hs Troponin:                24 <<== 04-02-20 @ 06:49              CSF:                      EKG:   MICROBIOLOGY:    IMAGING:      Labs, imaging, EKG personally reviewed Internal Medicine Progress Note    =======================================================  CONTACT INFO  Tiara Grayson M.D., PGY-3  Pager: [phone] ([facility]) / [phone] ([facility])    Mon-Fri: pager covered by day team 7am-7pm  After 7:00PM on weekdays and 12:00PM on weekends, page [phone]  =======================================================      Patient is a 69y old  Female who presents with a chief complaint of 69F p/w sob and fever (02 Apr 2020 21:31)      SUBJECTIVE / OVERNIGHT EVENTS: SAGE overnight. Still on 15L NC + 15L NRB. Pt has good appetite eating breakfast. Denies CP, abd pain, diarrhea.    MEDICATIONS  (STANDING):  allopurinol 300 milliGRAM(s) Oral daily  anakinra Injectable 100 milliGRAM(s) SubCutaneous <User Schedule>  azithromycin  IVPB 500 milliGRAM(s) IV Intermittent every 24 hours  azithromycin  IVPB      cefTRIAXone   IVPB 1000 milliGRAM(s) IV Intermittent every 24 hours  dorzolamide 2%/timolol 0.5% Ophthalmic Solution 1 Drop(s) Both EYES two times a day  enoxaparin Injectable 40 milliGRAM(s) SubCutaneous daily  furosemide   Injectable 20 milliGRAM(s) IV Push daily  hydroxychloroquine   Oral   hydroxychloroquine 200 milliGRAM(s) Oral every 12 hours  latanoprost 0.005% Ophthalmic Solution 1 Drop(s) Both EYES at bedtime  lisinopril 20 milliGRAM(s) Oral daily  methylPREDNISolone sodium succinate IVPB 40 milliGRAM(s) IV Intermittent two times a day    MEDICATIONS  (PRN):  acetaminophen   Tablet .. 650 milliGRAM(s) Oral every 6 hours PRN Temp greater or equal to 38C (100.4F)  benzocaine 15 mG/menthol 3.6 mG (Sugar-Free) Lozenge 1 Lozenge Oral four times a day PRN Cough  benzonatate 100 milliGRAM(s) Oral three times a day PRN Cough  guaiFENesin   Syrup  (Sugar-Free) 200 milliGRAM(s) Oral four times a day PRN Cough    Vital Signs Last 24 Hrs  T(C): 36.4 (03 Apr 2020 05:16), Max: 38.1 (02 Apr 2020 20:03)  T(F): 97.5 (03 Apr 2020 05:16), Max: 100.5 (02 Apr 2020 20:03)  HR: 88 (03 Apr 2020 05:16) (88 - 93)  BP: 149/92 (03 Apr 2020 05:16) (144/88 - 151/84)  BP(mean): --  RR: 20 (03 Apr 2020 05:16) (20 - 20)  SpO2: 92% (03 Apr 2020 05:16) (90% - 95%)    CAPILLARY BLOOD GLUCOSE    I&O's Summary      PHYSICAL EXAM  GENERAL: NAD  HEAD:  Atraumatic  EYES: EOMI, PERRLA, conjunctiva and sclera clear  NECK: Supple, No JVD  CHEST/LUNG: Crackles at bases; No wheeze, on 15L NC + 15L NRB  HEART: Regular rate and rhythm; No murmurs, rubs, or gallops  ABDOMEN: Soft, Nontender, Nondistended; Bowel sounds present  EXTREMITIES:  2+ Peripheral Pulses, No clubbing, cyanosis, or edema  NEURO/PSYCH: AAOx3, nonfocal  SKIN: No rashes or lesions      LABS:                         14.1   6.79  )-----------( 343      ( 03 Apr 2020 07:41 )             42.8     Hemoglobin: 14.1 g/dL (04-03 @ 07:41)  Hemoglobin: 13.4 g/dL (04-02 @ 06:53)  Hemoglobin: 13.4 g/dL (04-01 @ 05:19)  Hemoglobin: 13.6 g/dL (03-31 @ 21:52)    CBC Full  -  ( 03 Apr 2020 07:41 )  WBC Count : 6.79 K/uL  RBC Count : 4.63 M/uL  Hemoglobin : 14.1 g/dL  Hematocrit : 42.8 %  Platelet Count - Automated : 343 K/uL  Mean Cell Volume : 92.4 fl  Mean Cell Hemoglobin : 30.5 pg  Mean Cell Hemoglobin Concentration : 32.9 gm/dL  Auto Neutrophil # : 5.36 K/uL  Auto Lymphocyte # : 0.61 K/uL  Auto Monocyte # : 0.81 K/uL  Auto Eosinophil # : 0.00 K/uL  Auto Basophil # : 0.00 K/uL  Auto Neutrophil % : 77.0 %  Auto Lymphocyte % : 9.0 %  Auto Monocyte % : 12.0 %  Auto Eosinophil % : 0.0 %  Auto Basophil % : 0.0 %    04-03    137  |  94<L>  |  16  ----------------------------<  112<H>  3.8   |  27  |  0.68    Ca    9.4      03 Apr 2020 07:41  Phos  3.0     04-03  Mg     2.0     04-03    TPro  7.0  /  Alb  3.1<L>  /  TBili  0.4  /  DBili  x   /  AST  278<H>  /  ALT  195<H>  /  AlkPhos  147<H>  04-03    Creatinine Trend: 0.68<--, 0.69<--, 0.72<--, 0.71<--, 0.76<--  LIVER FUNCTIONS - ( 03 Apr 2020 07:41 )  Alb: 3.1 g/dL / Pro: 7.0 g/dL / ALK PHOS: 147 U/L / ALT: 195 U/L / AST: 278 U/L / GGT: x               hs Troponin:                17 <<== 04-03-20 @ 07:41                24 <<== 04-02-20 @ 06:49    EKG:   MICROBIOLOGY:    IMAGING:      Labs, imaging, EKG personally reviewed

## 2020-04-03 NOTE — PROGRESS NOTE ADULT - PROBLEM SELECTOR PLAN 2
- 2/2 COVID+ PNA vs acute HF  - CXR - hazy opacification of L lung compatible with PNA  - Currenlty on 15L NRB + 15L NC; avoid BiLevel/High Flow Nasal Cannula per institution policy given risk of aerosolization  - Empiric antibiotics for bacterial infection given due to clinical status  - Monitor daily COVID labs  - MCOT patch to monitor EKG daily  - Isolation precautions - 2/2 COVID+ PNA vs acute HF  - CXR - hazy opacification of L lung compatible with PNA  - Currenlty on 15L NRB + 15L NC; avoid BiLevel/High Flow Nasal Cannula per institution policy given risk of aerosolization  - Empiric CTX (4/1-) for bacterial infection given due to clinical status  - Monitor daily COVID labs  - MCOT patch to monitor EKG daily  - Isolation precautions

## 2020-04-03 NOTE — DIETITIAN INITIAL EVALUATION ADULT. - ADD RECOMMEND
1) Continue DASH/TLC diet - monitor/adjust as needed 2) Monitor PO intake, weight, labs, skin, GI status, diet

## 2020-04-04 LAB
ALBUMIN SERPL ELPH-MCNC: 3.1 G/DL — LOW (ref 3.3–5)
ALP SERPL-CCNC: 166 U/L — HIGH (ref 40–120)
ALT FLD-CCNC: 261 U/L — HIGH (ref 10–45)
ANION GAP SERPL CALC-SCNC: 17 MMOL/L — SIGNIFICANT CHANGE UP (ref 5–17)
AST SERPL-CCNC: 272 U/L — HIGH (ref 10–40)
BILIRUB SERPL-MCNC: 0.4 MG/DL — SIGNIFICANT CHANGE UP (ref 0.2–1.2)
BUN SERPL-MCNC: 31 MG/DL — HIGH (ref 7–23)
CALCIUM SERPL-MCNC: 9.8 MG/DL — SIGNIFICANT CHANGE UP (ref 8.4–10.5)
CHLORIDE SERPL-SCNC: 94 MMOL/L — LOW (ref 96–108)
CO2 SERPL-SCNC: 28 MMOL/L — SIGNIFICANT CHANGE UP (ref 22–31)
CREAT SERPL-MCNC: 0.77 MG/DL — SIGNIFICANT CHANGE UP (ref 0.5–1.3)
CRP SERPL-MCNC: 18.12 MG/DL — HIGH (ref 0–0.4)
FERRITIN SERPL-MCNC: 9832 NG/ML — HIGH (ref 15–150)
GLUCOSE SERPL-MCNC: 149 MG/DL — HIGH (ref 70–99)
HCT VFR BLD CALC: 43.2 % — SIGNIFICANT CHANGE UP (ref 34.5–45)
HGB BLD-MCNC: 14.2 G/DL — SIGNIFICANT CHANGE UP (ref 11.5–15.5)
MAGNESIUM SERPL-MCNC: 2.3 MG/DL — SIGNIFICANT CHANGE UP (ref 1.6–2.6)
MCHC RBC-ENTMCNC: 30.2 PG — SIGNIFICANT CHANGE UP (ref 27–34)
MCHC RBC-ENTMCNC: 32.9 GM/DL — SIGNIFICANT CHANGE UP (ref 32–36)
MCV RBC AUTO: 91.9 FL — SIGNIFICANT CHANGE UP (ref 80–100)
NRBC # BLD: 0 /100 WBCS — SIGNIFICANT CHANGE UP (ref 0–0)
PHOSPHATE SERPL-MCNC: 3.9 MG/DL — SIGNIFICANT CHANGE UP (ref 2.5–4.5)
PLATELET # BLD AUTO: 440 K/UL — HIGH (ref 150–400)
POTASSIUM SERPL-MCNC: 3.6 MMOL/L — SIGNIFICANT CHANGE UP (ref 3.5–5.3)
POTASSIUM SERPL-SCNC: 3.6 MMOL/L — SIGNIFICANT CHANGE UP (ref 3.5–5.3)
PROT SERPL-MCNC: 7.4 G/DL — SIGNIFICANT CHANGE UP (ref 6–8.3)
RBC # BLD: 4.7 M/UL — SIGNIFICANT CHANGE UP (ref 3.8–5.2)
RBC # FLD: 13.1 % — SIGNIFICANT CHANGE UP (ref 10.3–14.5)
SODIUM SERPL-SCNC: 139 MMOL/L — SIGNIFICANT CHANGE UP (ref 135–145)
WBC # BLD: 10.26 K/UL — SIGNIFICANT CHANGE UP (ref 3.8–10.5)
WBC # FLD AUTO: 10.26 K/UL — SIGNIFICANT CHANGE UP (ref 3.8–10.5)

## 2020-04-04 RX ADMIN — DORZOLAMIDE HYDROCHLORIDE TIMOLOL MALEATE 1 DROP(S): 20; 5 SOLUTION/ DROPS OPHTHALMIC at 15:30

## 2020-04-04 RX ADMIN — ENOXAPARIN SODIUM 40 MILLIGRAM(S): 100 INJECTION SUBCUTANEOUS at 12:19

## 2020-04-04 RX ADMIN — Medication 200 MILLIGRAM(S): at 22:09

## 2020-04-04 RX ADMIN — Medication 100 MILLIGRAM(S): at 13:45

## 2020-04-04 RX ADMIN — Medication 40 MILLIGRAM(S): at 15:30

## 2020-04-04 RX ADMIN — AZITHROMYCIN 250 MILLIGRAM(S): 500 TABLET, FILM COATED ORAL at 03:08

## 2020-04-04 RX ADMIN — Medication 100 MILLIGRAM(S): at 20:28

## 2020-04-04 RX ADMIN — Medication 40 MILLIGRAM(S): at 06:50

## 2020-04-04 RX ADMIN — Medication 20 MILLIGRAM(S): at 06:50

## 2020-04-04 RX ADMIN — LATANOPROST 1 DROP(S): 0.05 SOLUTION/ DROPS OPHTHALMIC; TOPICAL at 21:50

## 2020-04-04 RX ADMIN — LISINOPRIL 20 MILLIGRAM(S): 2.5 TABLET ORAL at 06:49

## 2020-04-04 RX ADMIN — Medication 300 MILLIGRAM(S): at 15:30

## 2020-04-04 RX ADMIN — Medication 200 MILLIGRAM(S): at 12:19

## 2020-04-04 RX ADMIN — Medication 100 MILLIGRAM(S): at 05:45

## 2020-04-04 RX ADMIN — DORZOLAMIDE HYDROCHLORIDE TIMOLOL MALEATE 1 DROP(S): 20; 5 SOLUTION/ DROPS OPHTHALMIC at 06:49

## 2020-04-04 RX ADMIN — CEFTRIAXONE 100 MILLIGRAM(S): 500 INJECTION, POWDER, FOR SOLUTION INTRAMUSCULAR; INTRAVENOUS at 02:15

## 2020-04-04 NOTE — PROGRESS NOTE ADULT - PROBLEM SELECTOR PLAN 1
will continue Plaquenil and zithromax  Patient started on anakinra and abx - C/w Plaquenil (4/2 - ), azithromycin (4/1 - ), anakinra (4/2 - ), ceftriaxone (4/1 - 4/5), solumedrol 4/3 - 4/5) - C/w Plaquenil (4/2 - ), azithromycin (4/1 - ), anakinra (4/2 - ), ceftriaxone (4/1 - 4/5), solumedrol 4/3 - 4/5)  - Monitor inflammatory markers    Interval events:  - Presents with fevers, chills, dry cough, SOB  - COVID+

## 2020-04-04 NOTE — PROGRESS NOTE ADULT - ASSESSMENT
70 yo F with PMhx of HTN and prediabetes presents to the ED with complaints of fever, chills, dry cough and worsening SOB admit to medicine for acute hypoxic respiratory failure 2/2 COVID-19 pneumonia.

## 2020-04-04 NOTE — PROGRESS NOTE ADULT - PROBLEM SELECTOR PLAN 2
continue 100% NRB as well as NC  keep O2 sats >90  started on steroids - Currently on NRB 15 + NC 15   - Aim for O2 sat > 90%  - Management as above

## 2020-04-04 NOTE — PROGRESS NOTE ADULT - PROBLEM SELECTOR PLAN 1
will continue current management  Patient has not worsened but has not improved  will continue to monitor ferritin and CRP

## 2020-04-04 NOTE — PROGRESS NOTE ADULT - PROBLEM SELECTOR PLAN 5
c/w lisinopril 20 (Per WHO guidelines there is no recommendation to discontinue ace-inhibitor medication)  HCTZ/Triamterene is dosed every other day outpatient, will hold for now

## 2020-04-04 NOTE — PROGRESS NOTE ADULT - SUBJECTIVE AND OBJECTIVE BOX
Contact Information:  Gerson Ware II, MD, MPH  PGY-1, Internal Medicine  Pager: [phone] ([facility]) /// [phone] ([facility])    KIMBERLY KUMAR, MRN-[id number]    Patient is a 69y old  Female who presents with a chief complaint of 69F p/w sob and fever (03 Apr 2020 21:39)      OVERNIGHT EVENTS:    SUBJECTIVE:    CONSTITUTIONAL: No weakness. No fatigue. No fever.  HEAD: No head trauma.   EYES: No vision changes.  ENT: No hearing changes or tinnitus. No ear pain. No changes in smell. No nasal congestion or discharge. No sore throat. No voice hoarseness.   NECK: No neck pain or stiffness. No lumps.  RESPIRATORY: No cough. No SOB. No wheezing. No hemoptysis.   CARDIOVASCULAR: No chest pain. No palpitations.   GASTROINTESTINAL: No dysphagia. No ABD pain. No distension. No constipation. No diarrhea. No pain with defecation. No hematemesis. No hematochezia or melena.  BACK: No back pain.  GENITOURINARY: No dysuria. No frequency or urgency. No hesitancy. No incontinence. No urinary retention. No suprapubic pain. No hematuria.  EXTREMITY: No swelling.  MUSCULOSKELETAL: No joint pain or swelling. No fractures. No stiffness.    SKIN: No rashes. No itching. No skin, hair, or nail changes.  NEUROLOGICAL: No weakness or paralysis. No lightheadedness or dizziness. No HA. No numbness or tingling.   PSYCHIATRIC: No depression.       OBJECTIVE:  Vital Signs Last 24 Hrs  T(C): 36.7 (04 Apr 2020 06:00), Max: 36.7 (04 Apr 2020 06:00)  T(F): 98 (04 Apr 2020 06:00), Max: 98 (04 Apr 2020 06:00)  HR: 87 (04 Apr 2020 06:00) (75 - 87)  BP: 134/83 (04 Apr 2020 06:00) (128/91 - 152/84)  BP(mean): --  RR: 20 (04 Apr 2020 06:00) (19 - 20)  SpO2: 94% (04 Apr 2020 06:00) (90% - 94%)  I&O's Summary    03 Apr 2020 07:01  -  04 Apr 2020 07:00  --------------------------------------------------------  IN: 50 mL / OUT: 860 mL / NET: -810 mL        MEDICATIONS  (STANDING):  allopurinol 300 milliGRAM(s) Oral daily  anakinra Injectable 100 milliGRAM(s) SubCutaneous <User Schedule>  azithromycin  IVPB 500 milliGRAM(s) IV Intermittent every 24 hours  azithromycin  IVPB      cefTRIAXone   IVPB 1000 milliGRAM(s) IV Intermittent every 24 hours  dorzolamide 2%/timolol 0.5% Ophthalmic Solution 1 Drop(s) Both EYES two times a day  enoxaparin Injectable 40 milliGRAM(s) SubCutaneous daily  furosemide   Injectable 20 milliGRAM(s) IV Push daily  hydroxychloroquine   Oral   hydroxychloroquine 200 milliGRAM(s) Oral every 12 hours  latanoprost 0.005% Ophthalmic Solution 1 Drop(s) Both EYES at bedtime  lisinopril 20 milliGRAM(s) Oral daily  methylPREDNISolone sodium succinate Injectable 40 milliGRAM(s) IV Push two times a day    MEDICATIONS  (PRN):  acetaminophen   Tablet .. 650 milliGRAM(s) Oral every 6 hours PRN Temp greater or equal to 38C (100.4F)  benzocaine 15 mG/menthol 3.6 mG (Sugar-Free) Lozenge 1 Lozenge Oral four times a day PRN Cough  benzonatate 100 milliGRAM(s) Oral three times a day PRN Cough  guaiFENesin   Syrup  (Sugar-Free) 200 milliGRAM(s) Oral four times a day PRN Cough    Allergies    neomycin (Rash)    Intolerances        CONSTITUTIONAL: No acute distress. Awake and alert.  HEAD: No evidence of trauma. Structures WNL.  EYES: +PERRL. +EOMI. No scleral icterus. No conjunctival injection.  ENT: Moist oral mucosa. No erythema. No pharyngeal exudates.   NECK: Supple. Appropriate ROM. No stiffness. No masses or lymphadenopathy.  RESPIRATORY: CTAB. No wheezes, rales, or rhonchi. No accessory muscle use. No apparent respiratory distress.  CARDIOVASCULAR: +S1/S2. No audible S3/S4. Regular rate and rhythm. No murmurs, rubs, or gallops. 2+ radial pulses x b/l UE; 2+ DP pulses x b/l LE.   GASTROINTESTINAL: Soft, nontender, nondistended. +BS. No rebound or guarding.   BACK: No spinal or paraspinal tenderness. No CVA tenderness.  EXTREMITY: No LE swelling or edema. EXTs warm to touch.  MUSCULOSKELETAL: Spontaneous movement in all extremities.  DERMATOLOGICAL: No abnormal rashes or lesions.  NEUROLOGICAL: CN 2-12 grossly intact. No focal deficits. Sensation intact x 4EXT. A&Ox3 (oriented to person, place, and time).  PSYCHIATRIC: Appropriate affect.                            14.1   6.79  )-----------( 343      ( 03 Apr 2020 07:41 )             42.8       04-03    137  |  94<L>  |  16  ----------------------------<  112<H>  3.8   |  27  |  0.68    Ca    9.4      03 Apr 2020 07:41  Phos  3.0     04-03  Mg     2.0     04-03    TPro  7.0  /  Alb  3.1<L>  /  TBili  0.4  /  DBili  x   /  AST  278<H>  /  ALT  195<H>  /  AlkPhos  147<H>  04-03    CAPILLARY BLOOD GLUCOSE        LIVER FUNCTIONS - ( 03 Apr 2020 07:41 )  Alb: 3.1 g/dL / Pro: 7.0 g/dL / ALK PHOS: 147 U/L / ALT: 195 U/L / AST: 278 U/L / GGT: x           CARDIAC MARKERS ( 03 Apr 2020 07:41 )  x     / x     / 177 U/L / x     / x                  RADIOLOGY AND ADDITIONAL TESTS:    CONSULTANT NOTES REVIEWED:    CARE DISCUSSED WITH THE FOLLOWING CONSULTANTS/PROVIDERS: Contact Information:  Gerson Ware II, MD, MPH  PGY-1, Internal Medicine  Pager: [phone] ([facility]) /// [phone] ([facility])    KIMBERLY KUMAR, MRN-[id number]    Patient is a 69y old  Female who presents with a chief complaint of 69F p/w sob and fever (03 Apr 2020 21:39)      OVERNIGHT EVENTS: No overnight events.    SUBJECTIVE: Patient evaluated at bedside, c/o SOB and cough; however, states that she overall feels a little better. Denies HA, lightheadedness, N/V, dizziness, fever, ABD pain/CP.    ROS  RESPIRATORY: +cough. +SOB.      OBJECTIVE:  Vital Signs Last 24 Hrs  T(C): 36.7 (04 Apr 2020 06:00), Max: 36.7 (04 Apr 2020 06:00)  T(F): 98 (04 Apr 2020 06:00), Max: 98 (04 Apr 2020 06:00)  HR: 87 (04 Apr 2020 06:00) (75 - 87)  BP: 134/83 (04 Apr 2020 06:00) (128/91 - 152/84)  BP(mean): --  RR: 20 (04 Apr 2020 06:00) (19 - 20)  SpO2: 94% (04 Apr 2020 06:00) (90% - 94%)  I&O's Summary    03 Apr 2020 07:01  -  04 Apr 2020 07:00  --------------------------------------------------------  IN: 50 mL / OUT: 860 mL / NET: -810 mL        MEDICATIONS  (STANDING):  allopurinol 300 milliGRAM(s) Oral daily  anakinra Injectable 100 milliGRAM(s) SubCutaneous <User Schedule>  azithromycin  IVPB 500 milliGRAM(s) IV Intermittent every 24 hours  azithromycin  IVPB      cefTRIAXone   IVPB 1000 milliGRAM(s) IV Intermittent every 24 hours  dorzolamide 2%/timolol 0.5% Ophthalmic Solution 1 Drop(s) Both EYES two times a day  enoxaparin Injectable 40 milliGRAM(s) SubCutaneous daily  furosemide   Injectable 20 milliGRAM(s) IV Push daily  hydroxychloroquine   Oral   hydroxychloroquine 200 milliGRAM(s) Oral every 12 hours  latanoprost 0.005% Ophthalmic Solution 1 Drop(s) Both EYES at bedtime  lisinopril 20 milliGRAM(s) Oral daily  methylPREDNISolone sodium succinate Injectable 40 milliGRAM(s) IV Push two times a day    MEDICATIONS  (PRN):  acetaminophen   Tablet .. 650 milliGRAM(s) Oral every 6 hours PRN Temp greater or equal to 38C (100.4F)  benzocaine 15 mG/menthol 3.6 mG (Sugar-Free) Lozenge 1 Lozenge Oral four times a day PRN Cough  benzonatate 100 milliGRAM(s) Oral three times a day PRN Cough  guaiFENesin   Syrup  (Sugar-Free) 200 milliGRAM(s) Oral four times a day PRN Cough    Allergies    neomycin (Rash)    Intolerances        CONSTITUTIONAL: Tired appearing. Slight distress due to SOB.  EYES: No scleral icterus. No conjunctival injection.  ENT: Moist oral mucosa.  RESPIRATORY: Decreased lung sounds posteriorly, particularly on left side. No crackles auscultated. Slight respiratory distress evident.  CARDIOVASCULAR: +S1/S2. No audible S3/S4. Regular rate and rhythm. No murmurs, rubs, or gallops.   GASTROINTESTINAL: Soft, nontender, nondistended. +BS. No rebound or guarding.   EXTREMITY: No LE swelling or edema. EXTs warm to touch.  MUSCULOSKELETAL: Spontaneous movement in all extremities.  DERMATOLOGICAL: No abnormal rashes or lesions.  NEUROLOGICAL: Nonfocal.                            14.1   6.79  )-----------( 343      ( 03 Apr 2020 07:41 )             42.8       04-03    137  |  94<L>  |  16  ----------------------------<  112<H>  3.8   |  27  |  0.68    Ca    9.4      03 Apr 2020 07:41  Phos  3.0     04-03  Mg     2.0     04-03    TPro  7.0  /  Alb  3.1<L>  /  TBili  0.4  /  DBili  x   /  AST  278<H>  /  ALT  195<H>  /  AlkPhos  147<H>  04-03    CAPILLARY BLOOD GLUCOSE        LIVER FUNCTIONS - ( 03 Apr 2020 07:41 )  Alb: 3.1 g/dL / Pro: 7.0 g/dL / ALK PHOS: 147 U/L / ALT: 195 U/L / AST: 278 U/L / GGT: x           CARDIAC MARKERS ( 03 Apr 2020 07:41 )  x     / x     / 177 U/L / x     / x                  RADIOLOGY AND ADDITIONAL TESTS:    CONSULTANT NOTES REVIEWED:    CARE DISCUSSED WITH THE FOLLOWING CONSULTANTS/PROVIDERS:

## 2020-04-04 NOTE — PROGRESS NOTE ADULT - PROBLEM SELECTOR PLAN 3
will continue current regime  will intubate as needed - Management as above  - Per patient daughter Joanie, patient FULL CODE

## 2020-04-04 NOTE — PROGRESS NOTE ADULT - PROBLEM SELECTOR PLAN 7
- DVT prophylaxis: Lovenox  - Diet: Soft DASH/TLC diet  - Disposition: Pending clinical status Transition of Care Status:  1. Name of PCP:  2. PCP contacted on admission: [  ] Y     [  ] N  3. PCP contacted at discharge: [  ] Y     [  ] N  4. Post-discharge appointment date and location:  5. Summary of handoff given to PCP:

## 2020-04-04 NOTE — PROGRESS NOTE ADULT - SUBJECTIVE AND OBJECTIVE BOX
70 yo F with PMhx of HTN and prediabetes presents to the ED with complaints of fever, chills, dry cough and worsening SOB. Patient reports that she has been feeling febrile for the last 2 weeks. She has intermittent chills and dry cough. She feels chest pressure when she coughs. Her SOB has been worsening. She becomes short of breath when she walks to the bathroom from the bed. She also reports to have orthopnea and LE edema. She has been sleeping in the chair for the last couple of days. She denies any recent travel. She went to [place] before her symptoms and thinks that she was exposed there. She reports to have HTN and prediabetes. She had C-sectionsx2 in the past. No family hx of heart or lung disease. Lives with her . Able to take care of ADLs. No hx of smoking or chronic alcohol consumptions. Allergic to neuomycin-develops rash and swelling. Patient with increasing shortness of breath. Patient continue to need 100 % NRB. continue to follow O2Sats       MEDICATIONS  (STANDING):  allopurinol 300 milliGRAM(s) Oral daily  anakinra Injectable 100 milliGRAM(s) SubCutaneous <User Schedule>  azithromycin  IVPB 500 milliGRAM(s) IV Intermittent every 24 hours  azithromycin  IVPB      cefTRIAXone   IVPB 1000 milliGRAM(s) IV Intermittent every 24 hours  dorzolamide 2%/timolol 0.5% Ophthalmic Solution 1 Drop(s) Both EYES two times a day  enoxaparin Injectable 40 milliGRAM(s) SubCutaneous daily  furosemide   Injectable 20 milliGRAM(s) IV Push daily  hydroxychloroquine   Oral   hydroxychloroquine 200 milliGRAM(s) Oral every 12 hours  latanoprost 0.005% Ophthalmic Solution 1 Drop(s) Both EYES at bedtime  lisinopril 20 milliGRAM(s) Oral daily  methylPREDNISolone sodium succinate Injectable 40 milliGRAM(s) IV Push two times a day    MEDICATIONS  (PRN):  acetaminophen   Tablet .. 650 milliGRAM(s) Oral every 6 hours PRN Temp greater or equal to 38C (100.4F)  benzocaine 15 mG/menthol 3.6 mG (Sugar-Free) Lozenge 1 Lozenge Oral four times a day PRN Cough  benzonatate 100 milliGRAM(s) Oral three times a day PRN Cough  guaiFENesin   Syrup  (Sugar-Free) 200 milliGRAM(s) Oral four times a day PRN Cough          VITALS:   T(C): 36.4 (04-04-20 @ 12:30), Max: 36.7 (04-04-20 @ 06:00)  HR: 92 (04-04-20 @ 12:30) (87 - 92)  BP: 146/78 (04-04-20 @ 12:30) (134/83 - 146/78)  RR: 16 (04-04-20 @ 12:30) (16 - 20)  SpO2: 92% (04-04-20 @ 12:30) (92% - 94%)  Wt(kg): --    PHYSICAL EXAM:  GENERAL: NAD, well nourished and conversant  HEAD:  Atraumatic  EYES: EOM, PERRLA, conjunctiva pink and sclera white  ENT: No tonsillar erythema, exudates, or enlargement, moist mucous membranes, good dentition, no lesions  NECK: Supple, No JVD, normal thyroid, carotids with normal upstrokes and no bruits  CHEST/LUNG: decreased BS with soft rhonchi  HEART: Regular rate and rhythm, No murmurs, rubs, or gallops  ABDOMEN: Soft, nondistended, no masses, guarding, tenderness or rebound, bowel sounds present  EXTREMITIES:  2+ Peripheral Pulses, No clubbing, cyanosis, or edema.   LYMPH: No lymphadenopathy noted  SKIN: No rashes or lesions  NERVOUS SYSTEM:  Alert & Oriented X3, normal cognitive function. Motor Strength 5/5 right upper and right lower.  5/5 left upper and left lower extremities, DTRs 2+ intact and symmetric    LABS:    CARDIAC MARKERS ( 03 Apr 2020 07:41 )  x     / x     / 177 U/L / x     / x          CBC Full  -  ( 04 Apr 2020 06:56 )  WBC Count : 10.26 K/uL  RBC Count : 4.70 M/uL  Hemoglobin : 14.2 g/dL  Hematocrit : 43.2 %  Platelet Count - Automated : 440 K/uL  Mean Cell Volume : 91.9 fl  Mean Cell Hemoglobin : 30.2 pg  Mean Cell Hemoglobin Concentration : 32.9 gm/dL  Auto Neutrophil # : x  Auto Lymphocyte # : x  Auto Monocyte # : x  Auto Eosinophil # : x  Auto Basophil # : x  Auto Neutrophil % : x  Auto Lymphocyte % : x  Auto Monocyte % : x  Auto Eosinophil % : x  Auto Basophil % : x    04-04    139  |  94<L>  |  31<H>  ----------------------------<  149<H>  3.6   |  28  |  0.77    Ca    9.8      04 Apr 2020 06:56  Phos  3.9     04-04  Mg     2.3     04-04    TPro  7.4  /  Alb  3.1<L>  /  TBili  0.4  /  DBili  x   /  AST  272<H>  /  ALT  261<H>  /  AlkPhos  166<H>  04-04    LIVER FUNCTIONS - ( 04 Apr 2020 06:56 )  Alb: 3.1 g/dL / Pro: 7.4 g/dL / ALK PHOS: 166 U/L / ALT: 261 U/L / AST: 272 U/L / GGT: x               CAPILLARY BLOOD GLUCOSE          RADIOLOGY & ADDITIONAL TESTS:

## 2020-04-04 NOTE — PROGRESS NOTE ADULT - PROBLEM SELECTOR PLAN 4
will continue lasix 20 daily   given an extra 20 mg today  will continue to add diuretics as tolerated - C/w lasix 20   - Will add additional diuresis as clinically indicated

## 2020-04-04 NOTE — PROGRESS NOTE ADULT - PROBLEM SELECTOR PLAN 5
- K WNL  - Trend BMP - c/w lisinopril 20 (Per WHO guidelines there is no recommendation to discontinue ace-inhibitor medication)  - HCTZ/Triamterene is dosed every other day outpatient, will hold for now

## 2020-04-04 NOTE — PROGRESS NOTE ADULT - PROBLEM SELECTOR PLAN 6
- c/w lisinopril 20 (Per WHO guidelines there is no recommendation to discontinue ace-inhibitor medication)  - HCTZ/Triamterene is dosed every other day outpatient, will hold for now - DVT prophylaxis: Lovenox  - Diet: Soft DASH/TLC diet  - Disposition: Pending clinical status

## 2020-04-05 LAB
ALBUMIN SERPL ELPH-MCNC: 3.3 G/DL — SIGNIFICANT CHANGE UP (ref 3.3–5)
ALP SERPL-CCNC: 175 U/L — HIGH (ref 40–120)
ALT FLD-CCNC: 325 U/L — HIGH (ref 10–45)
ANION GAP SERPL CALC-SCNC: 17 MMOL/L — SIGNIFICANT CHANGE UP (ref 5–17)
AST SERPL-CCNC: 195 U/L — HIGH (ref 10–40)
BILIRUB SERPL-MCNC: 0.5 MG/DL — SIGNIFICANT CHANGE UP (ref 0.2–1.2)
BUN SERPL-MCNC: 37 MG/DL — HIGH (ref 7–23)
CALCIUM SERPL-MCNC: 10 MG/DL — SIGNIFICANT CHANGE UP (ref 8.4–10.5)
CHLORIDE SERPL-SCNC: 90 MMOL/L — LOW (ref 96–108)
CO2 SERPL-SCNC: 31 MMOL/L — SIGNIFICANT CHANGE UP (ref 22–31)
CREAT SERPL-MCNC: 0.86 MG/DL — SIGNIFICANT CHANGE UP (ref 0.5–1.3)
CRP SERPL-MCNC: 9.08 MG/DL — HIGH (ref 0–0.4)
FERRITIN SERPL-MCNC: 9284 NG/ML — HIGH (ref 15–150)
GLUCOSE SERPL-MCNC: 222 MG/DL — HIGH (ref 70–99)
HCT VFR BLD CALC: 45.9 % — HIGH (ref 34.5–45)
HGB BLD-MCNC: 14.7 G/DL — SIGNIFICANT CHANGE UP (ref 11.5–15.5)
MAGNESIUM SERPL-MCNC: 2.3 MG/DL — SIGNIFICANT CHANGE UP (ref 1.6–2.6)
MCHC RBC-ENTMCNC: 29.8 PG — SIGNIFICANT CHANGE UP (ref 27–34)
MCHC RBC-ENTMCNC: 32 GM/DL — SIGNIFICANT CHANGE UP (ref 32–36)
MCV RBC AUTO: 93.1 FL — SIGNIFICANT CHANGE UP (ref 80–100)
NRBC # BLD: 0 /100 WBCS — SIGNIFICANT CHANGE UP (ref 0–0)
PHOSPHATE SERPL-MCNC: 3.7 MG/DL — SIGNIFICANT CHANGE UP (ref 2.5–4.5)
PLATELET # BLD AUTO: 539 K/UL — HIGH (ref 150–400)
POTASSIUM SERPL-MCNC: 3.6 MMOL/L — SIGNIFICANT CHANGE UP (ref 3.5–5.3)
POTASSIUM SERPL-SCNC: 3.6 MMOL/L — SIGNIFICANT CHANGE UP (ref 3.5–5.3)
PROT SERPL-MCNC: 7.5 G/DL — SIGNIFICANT CHANGE UP (ref 6–8.3)
RBC # BLD: 4.93 M/UL — SIGNIFICANT CHANGE UP (ref 3.8–5.2)
RBC # FLD: 13.3 % — SIGNIFICANT CHANGE UP (ref 10.3–14.5)
SODIUM SERPL-SCNC: 138 MMOL/L — SIGNIFICANT CHANGE UP (ref 135–145)
WBC # BLD: 13.07 K/UL — HIGH (ref 3.8–10.5)
WBC # FLD AUTO: 13.07 K/UL — HIGH (ref 3.8–10.5)

## 2020-04-05 RX ORDER — FUROSEMIDE 40 MG
20 TABLET ORAL ONCE
Refills: 0 | Status: DISCONTINUED | OUTPATIENT
Start: 2020-04-05 | End: 2020-04-05

## 2020-04-05 RX ORDER — FUROSEMIDE 40 MG
20 TABLET ORAL ONCE
Refills: 0 | Status: COMPLETED | OUTPATIENT
Start: 2020-04-05 | End: 2020-04-05

## 2020-04-05 RX ADMIN — Medication 40 MILLIGRAM(S): at 17:50

## 2020-04-05 RX ADMIN — Medication 200 MILLIGRAM(S): at 23:48

## 2020-04-05 RX ADMIN — AZITHROMYCIN 250 MILLIGRAM(S): 500 TABLET, FILM COATED ORAL at 02:18

## 2020-04-05 RX ADMIN — ENOXAPARIN SODIUM 40 MILLIGRAM(S): 100 INJECTION SUBCUTANEOUS at 14:47

## 2020-04-05 RX ADMIN — DORZOLAMIDE HYDROCHLORIDE TIMOLOL MALEATE 1 DROP(S): 20; 5 SOLUTION/ DROPS OPHTHALMIC at 17:51

## 2020-04-05 RX ADMIN — Medication 100 MILLIGRAM(S): at 02:17

## 2020-04-05 RX ADMIN — Medication 20 MILLIGRAM(S): at 14:45

## 2020-04-05 RX ADMIN — Medication 40 MILLIGRAM(S): at 05:01

## 2020-04-05 RX ADMIN — Medication 100 MILLIGRAM(S): at 17:50

## 2020-04-05 RX ADMIN — LISINOPRIL 20 MILLIGRAM(S): 2.5 TABLET ORAL at 05:02

## 2020-04-05 RX ADMIN — CEFTRIAXONE 100 MILLIGRAM(S): 500 INJECTION, POWDER, FOR SOLUTION INTRAMUSCULAR; INTRAVENOUS at 02:17

## 2020-04-05 RX ADMIN — Medication 300 MILLIGRAM(S): at 17:50

## 2020-04-05 RX ADMIN — Medication 100 MILLIGRAM(S): at 10:39

## 2020-04-05 RX ADMIN — Medication 20 MILLIGRAM(S): at 05:01

## 2020-04-05 RX ADMIN — Medication 200 MILLIGRAM(S): at 17:50

## 2020-04-05 RX ADMIN — DORZOLAMIDE HYDROCHLORIDE TIMOLOL MALEATE 1 DROP(S): 20; 5 SOLUTION/ DROPS OPHTHALMIC at 05:01

## 2020-04-05 RX ADMIN — LATANOPROST 1 DROP(S): 0.05 SOLUTION/ DROPS OPHTHALMIC; TOPICAL at 22:02

## 2020-04-05 NOTE — PROGRESS NOTE ADULT - SUBJECTIVE AND OBJECTIVE BOX
70 yo F with PMhx of HTN and prediabetes presents to the ED with complaints of fever, chills, dry cough and worsening SOB. Patient reports that she has been feeling febrile for the last 2 weeks. She has intermittent chills and dry cough. She feels chest pressure when she coughs. Her SOB has been worsening. She becomes short of breath when she walks to the bathroom from the bed. She also reports to have orthopnea and LE edema. She has been sleeping in the chair for the last couple of days. She denies any recent travel. She went to [place] before her symptoms and thinks that she was exposed there. She reports to have HTN and prediabetes. She had C-sectionsx2 in the past. No family hx of heart or lung disease. Lives with her . Able to take care of ADLs. No hx of smoking or chronic alcohol consumptions. Allergic to neuomycin-develops rash and swelling. Patient with increasing shortness of breath. Patient continue to need 100 % NRB. continue to follow O2Sat. Patient unchanged today. WBC count slightly elevated    MEDICATIONS  (STANDING):  allopurinol 300 milliGRAM(s) Oral daily  anakinra Injectable 100 milliGRAM(s) SubCutaneous <User Schedule>  azithromycin  IVPB 500 milliGRAM(s) IV Intermittent every 24 hours  azithromycin  IVPB      dorzolamide 2%/timolol 0.5% Ophthalmic Solution 1 Drop(s) Both EYES two times a day  enoxaparin Injectable 40 milliGRAM(s) SubCutaneous daily  furosemide   Injectable 20 milliGRAM(s) IV Push daily  furosemide   IVPB 20 milliGRAM(s) IV Intermittent once  hydroxychloroquine   Oral   hydroxychloroquine 200 milliGRAM(s) Oral every 12 hours  latanoprost 0.005% Ophthalmic Solution 1 Drop(s) Both EYES at bedtime  lisinopril 20 milliGRAM(s) Oral daily  methylPREDNISolone sodium succinate Injectable 40 milliGRAM(s) IV Push two times a day    MEDICATIONS  (PRN):  acetaminophen   Tablet .. 650 milliGRAM(s) Oral every 6 hours PRN Temp greater or equal to 38C (100.4F)  benzocaine 15 mG/menthol 3.6 mG (Sugar-Free) Lozenge 1 Lozenge Oral four times a day PRN Cough  benzonatate 100 milliGRAM(s) Oral three times a day PRN Cough  guaiFENesin   Syrup  (Sugar-Free) 200 milliGRAM(s) Oral four times a day PRN Cough          VITALS:   T(C): 36.4 (04-05-20 @ 04:59), Max: 36.5 (04-04-20 @ 20:13)  HR: 75 (04-05-20 @ 04:59) (75 - 92)  BP: 150/88 (04-05-20 @ 04:59) (146/78 - 154/83)  RR: 19 (04-05-20 @ 04:59) (16 - 19)  SpO2: 95% (04-05-20 @ 04:59) (92% - 95%)  Wt(kg): --    PHYSICAL EXAM:  GENERAL: NAD, well nourished and conversant  HEAD:  Atraumatic  EYES: EOM, PERRLA, conjunctiva pink and sclera white  ENT: No tonsillar erythema, exudates, or enlargement, moist mucous membranes, good dentition, no lesions  NECK: Supple, No JVD, normal thyroid, carotids with normal upstrokes and no bruits  CHEST/LUNG: decreased BS with soft rhonchi  HEART: Regular rate and rhythm, No murmurs, rubs, or gallops  ABDOMEN: Soft, nondistended, no masses, guarding, tenderness or rebound, bowel sounds present  EXTREMITIES:  2+ Peripheral Pulses, No clubbing, cyanosis, or edema.   LYMPH: No lymphadenopathy noted  SKIN: No rashes or lesions  NERVOUS SYSTEM:  Alert & Oriented X3, normal cognitive function. Motor Strength 5/5 right upper and right lower.  5/5 left upper and left lower extremities, DTRs 2+ intact and symmetric    LABS:        CBC Full  -  ( 05 Apr 2020 11:08 )  WBC Count : 13.07 K/uL  RBC Count : 4.93 M/uL  Hemoglobin : 14.7 g/dL  Hematocrit : 45.9 %  Platelet Count - Automated : 539 K/uL  Mean Cell Volume : 93.1 fl  Mean Cell Hemoglobin : 29.8 pg  Mean Cell Hemoglobin Concentration : 32.0 gm/dL  Auto Neutrophil # : x  Auto Lymphocyte # : x  Auto Monocyte # : x  Auto Eosinophil # : x  Auto Basophil # : x  Auto Neutrophil % : x  Auto Lymphocyte % : x  Auto Monocyte % : x  Auto Eosinophil % : x  Auto Basophil % : x    04-05    138  |  90<L>  |  37<H>  ----------------------------<  222<H>  3.6   |  31  |  0.86    Ca    10.0      05 Apr 2020 11:08  Phos  3.7     04-05  Mg     2.3     04-05    TPro  7.5  /  Alb  3.3  /  TBili  0.5  /  DBili  x   /  AST  195<H>  /  ALT  325<H>  /  AlkPhos  175<H>  04-05    LIVER FUNCTIONS - ( 05 Apr 2020 11:08 )  Alb: 3.3 g/dL / Pro: 7.5 g/dL / ALK PHOS: 175 U/L / ALT: 325 U/L / AST: 195 U/L / GGT: x               CAPILLARY BLOOD GLUCOSE          RADIOLOGY & ADDITIONAL TESTS:

## 2020-04-05 NOTE — PROGRESS NOTE ADULT - PROBLEM SELECTOR PLAN 2
Currently on NRB 15 + NC 15   Aim for O2 sat > 90%  Management as above - Currently on NRB 15 + NC 15   - Aim for O2 sat > 90%  - Management as above

## 2020-04-05 NOTE — PROGRESS NOTE ADULT - PROBLEM SELECTOR PLAN 3
Per patient daughter Joanie, patient FULL CODE - Management as above  - Per patient daughter Joanie, patient FULL CODE

## 2020-04-05 NOTE — PROGRESS NOTE ADULT - PROBLEM SELECTOR PLAN 5
c/w lisinopril 20 (Per WHO guidelines there is no recommendation to discontinue ace-inhibitor medication)  HCTZ/Triamterene is dosed every other day outpatient, will hold for now - c/w lisinopril 20 (Per WHO guidelines there is no recommendation to discontinue ace-inhibitor medication)  - HCTZ/Triamterene is dosed every other day outpatient, will hold for now

## 2020-04-05 NOTE — PROGRESS NOTE ADULT - PROBLEM SELECTOR PLAN 2
Currently on NRB 15 + NC 15   Aim for O2 sat > 90%  will give an extra dose of lasix with slight rales on exam

## 2020-04-05 NOTE — PROGRESS NOTE ADULT - SUBJECTIVE AND OBJECTIVE BOX
Contact Information:  Gerson Ware II, MD, MPH  PGY-1, Internal Medicine  Pager: [phone] ([facility]) /// [phone] ([facility]    KIMBERLY KUMAR, MRN-[id number]    Patient is a 69y old  Female who presents with a chief complaint of 69F p/w sob and fever (04 Apr 2020 20:08)      OVERNIGHT EVENTS:    SUBJECTIVE:    CONSTITUTIONAL: No weakness. No fatigue. No fever.  HEAD: No head trauma.   EYES: No vision changes.  ENT: No hearing changes or tinnitus. No ear pain. No changes in smell. No nasal congestion or discharge. No sore throat. No voice hoarseness.   NECK: No neck pain or stiffness. No lumps.  RESPIRATORY: No cough. No SOB. No wheezing. No hemoptysis.   CARDIOVASCULAR: No chest pain. No palpitations.   GASTROINTESTINAL: No dysphagia. No ABD pain. No distension. No constipation. No diarrhea. No pain with defecation. No hematemesis. No hematochezia or melena.  BACK: No back pain.  GENITOURINARY: No dysuria. No frequency or urgency. No hesitancy. No incontinence. No urinary retention. No suprapubic pain. No hematuria.  EXTREMITY: No swelling.  MUSCULOSKELETAL: No joint pain or swelling. No fractures. No stiffness.    SKIN: No rashes. No itching. No skin, hair, or nail changes.  NEUROLOGICAL: No weakness or paralysis. No lightheadedness or dizziness. No HA. No numbness or tingling.   PSYCHIATRIC: No depression.       OBJECTIVE:  Vital Signs Last 24 Hrs  T(C): 36.4 (05 Apr 2020 04:59), Max: 36.5 (04 Apr 2020 20:13)  T(F): 97.6 (05 Apr 2020 04:59), Max: 97.7 (04 Apr 2020 20:13)  HR: 75 (05 Apr 2020 04:59) (75 - 92)  BP: 150/88 (05 Apr 2020 04:59) (146/78 - 154/83)  BP(mean): --  RR: 19 (05 Apr 2020 04:59) (16 - 19)  SpO2: 95% (05 Apr 2020 04:59) (92% - 95%)  I&O's Summary    04 Apr 2020 07:01  -  05 Apr 2020 07:00  --------------------------------------------------------  IN: 715 mL / OUT: 1600 mL / NET: -885 mL        MEDICATIONS  (STANDING):  allopurinol 300 milliGRAM(s) Oral daily  anakinra Injectable 100 milliGRAM(s) SubCutaneous <User Schedule>  azithromycin  IVPB 500 milliGRAM(s) IV Intermittent every 24 hours  azithromycin  IVPB      dorzolamide 2%/timolol 0.5% Ophthalmic Solution 1 Drop(s) Both EYES two times a day  enoxaparin Injectable 40 milliGRAM(s) SubCutaneous daily  furosemide   Injectable 20 milliGRAM(s) IV Push daily  hydroxychloroquine   Oral   hydroxychloroquine 200 milliGRAM(s) Oral every 12 hours  latanoprost 0.005% Ophthalmic Solution 1 Drop(s) Both EYES at bedtime  lisinopril 20 milliGRAM(s) Oral daily  methylPREDNISolone sodium succinate Injectable 40 milliGRAM(s) IV Push two times a day    MEDICATIONS  (PRN):  acetaminophen   Tablet .. 650 milliGRAM(s) Oral every 6 hours PRN Temp greater or equal to 38C (100.4F)  benzocaine 15 mG/menthol 3.6 mG (Sugar-Free) Lozenge 1 Lozenge Oral four times a day PRN Cough  benzonatate 100 milliGRAM(s) Oral three times a day PRN Cough  guaiFENesin   Syrup  (Sugar-Free) 200 milliGRAM(s) Oral four times a day PRN Cough    Allergies    neomycin (Rash)    Intolerances        CONSTITUTIONAL: No acute distress. Awake and alert.  HEAD: No evidence of trauma. Structures WNL.  EYES: +PERRL. +EOMI. No scleral icterus. No conjunctival injection.  ENT: Moist oral mucosa. No erythema. No pharyngeal exudates.   NECK: Supple. Appropriate ROM. No stiffness. No masses or lymphadenopathy.  RESPIRATORY: CTAB. No wheezes, rales, or rhonchi. No accessory muscle use. No apparent respiratory distress.  CARDIOVASCULAR: +S1/S2. No audible S3/S4. Regular rate and rhythm. No murmurs, rubs, or gallops. 2+ radial pulses x b/l UE; 2+ DP pulses x b/l LE.   GASTROINTESTINAL: Soft, nontender, nondistended. +BS. No rebound or guarding.   BACK: No spinal or paraspinal tenderness. No CVA tenderness.  EXTREMITY: No LE swelling or edema. EXTs warm to touch.  MUSCULOSKELETAL: Spontaneous movement in all extremities.  DERMATOLOGICAL: No abnormal rashes or lesions.  NEUROLOGICAL: CN 2-12 grossly intact. No focal deficits. Sensation intact x 4EXT. A&Ox3 (oriented to person, place, and time).  PSYCHIATRIC: Appropriate affect.                            14.2   10.26 )-----------( 440      ( 04 Apr 2020 06:56 )             43.2       04-04    139  |  94<L>  |  31<H>  ----------------------------<  149<H>  3.6   |  28  |  0.77    Ca    9.8      04 Apr 2020 06:56  Phos  3.9     04-04  Mg     2.3     04-04    TPro  7.4  /  Alb  3.1<L>  /  TBili  0.4  /  DBili  x   /  AST  272<H>  /  ALT  261<H>  /  AlkPhos  166<H>  04-04    CAPILLARY BLOOD GLUCOSE        LIVER FUNCTIONS - ( 04 Apr 2020 06:56 )  Alb: 3.1 g/dL / Pro: 7.4 g/dL / ALK PHOS: 166 U/L / ALT: 261 U/L / AST: 272 U/L / GGT: x           CARDIAC MARKERS ( 03 Apr 2020 07:41 )  x     / x     / 177 U/L / x     / x                  RADIOLOGY AND ADDITIONAL TESTS:    CONSULTANT NOTES REVIEWED:    CARE DISCUSSED WITH THE FOLLOWING CONSULTANTS/PROVIDERS:

## 2020-04-05 NOTE — PROGRESS NOTE ADULT - PROBLEM SELECTOR PLAN 1
will continue current management  Patient has not worsened but has not improved  will continue to monitor ferritin and CRP  Prone positioning as tolerated  will monitor leucocytosis probably due to steroids

## 2020-04-05 NOTE — PROGRESS NOTE ADULT - PROBLEM SELECTOR PLAN 7
Transition of Care Status:  1. Name of PCP:  2. PCP contacted on admission: [  ] Y     [  ] N  3. PCP contacted at discharge: [  ] Y     [  ] N  4. Post-discharge appointment date and location:  5. Summary of handoff given to PCP:

## 2020-04-05 NOTE — PROGRESS NOTE ADULT - PROBLEM SELECTOR PLAN 4
C/w lasix 20   Will add additional diuresis as clinically indicated - C/w lasix 20   - Will add additional diuresis as clinically indicated

## 2020-04-06 LAB
ALBUMIN SERPL ELPH-MCNC: 3.2 G/DL — LOW (ref 3.3–5)
ALP SERPL-CCNC: 167 U/L — HIGH (ref 40–120)
ALT FLD-CCNC: 244 U/L — HIGH (ref 10–45)
ANION GAP SERPL CALC-SCNC: 16 MMOL/L — SIGNIFICANT CHANGE UP (ref 5–17)
AST SERPL-CCNC: 97 U/L — HIGH (ref 10–40)
BASOPHILS # BLD AUTO: 0.02 K/UL — SIGNIFICANT CHANGE UP (ref 0–0.2)
BASOPHILS NFR BLD AUTO: 0.2 % — SIGNIFICANT CHANGE UP (ref 0–2)
BILIRUB SERPL-MCNC: 0.6 MG/DL — SIGNIFICANT CHANGE UP (ref 0.2–1.2)
BUN SERPL-MCNC: 38 MG/DL — HIGH (ref 7–23)
CALCIUM SERPL-MCNC: 9.7 MG/DL — SIGNIFICANT CHANGE UP (ref 8.4–10.5)
CHLORIDE SERPL-SCNC: 93 MMOL/L — LOW (ref 96–108)
CO2 SERPL-SCNC: 31 MMOL/L — SIGNIFICANT CHANGE UP (ref 22–31)
CREAT SERPL-MCNC: 0.8 MG/DL — SIGNIFICANT CHANGE UP (ref 0.5–1.3)
CRP SERPL-MCNC: 5.24 MG/DL — HIGH (ref 0–0.4)
EOSINOPHIL # BLD AUTO: 0 K/UL — SIGNIFICANT CHANGE UP (ref 0–0.5)
EOSINOPHIL NFR BLD AUTO: 0 % — SIGNIFICANT CHANGE UP (ref 0–6)
FERRITIN SERPL-MCNC: 2991 NG/ML — HIGH (ref 15–150)
GLUCOSE SERPL-MCNC: 222 MG/DL — HIGH (ref 70–99)
HCT VFR BLD CALC: 44.1 % — SIGNIFICANT CHANGE UP (ref 34.5–45)
HGB BLD-MCNC: 14.3 G/DL — SIGNIFICANT CHANGE UP (ref 11.5–15.5)
IMM GRANULOCYTES NFR BLD AUTO: 0.6 % — SIGNIFICANT CHANGE UP (ref 0–1.5)
LYMPHOCYTES # BLD AUTO: 0.69 K/UL — LOW (ref 1–3.3)
LYMPHOCYTES # BLD AUTO: 5.6 % — LOW (ref 13–44)
MAGNESIUM SERPL-MCNC: 2.3 MG/DL — SIGNIFICANT CHANGE UP (ref 1.6–2.6)
MCHC RBC-ENTMCNC: 30.3 PG — SIGNIFICANT CHANGE UP (ref 27–34)
MCHC RBC-ENTMCNC: 32.4 GM/DL — SIGNIFICANT CHANGE UP (ref 32–36)
MCV RBC AUTO: 93.4 FL — SIGNIFICANT CHANGE UP (ref 80–100)
MONOCYTES # BLD AUTO: 0.28 K/UL — SIGNIFICANT CHANGE UP (ref 0–0.9)
MONOCYTES NFR BLD AUTO: 2.3 % — SIGNIFICANT CHANGE UP (ref 2–14)
NEUTROPHILS # BLD AUTO: 11.33 K/UL — HIGH (ref 1.8–7.4)
NEUTROPHILS NFR BLD AUTO: 91.3 % — HIGH (ref 43–77)
PHOSPHATE SERPL-MCNC: 3.5 MG/DL — SIGNIFICANT CHANGE UP (ref 2.5–4.5)
PLATELET # BLD AUTO: 558 K/UL — HIGH (ref 150–400)
POTASSIUM SERPL-MCNC: 4.2 MMOL/L — SIGNIFICANT CHANGE UP (ref 3.5–5.3)
POTASSIUM SERPL-SCNC: 4.2 MMOL/L — SIGNIFICANT CHANGE UP (ref 3.5–5.3)
PROT SERPL-MCNC: 7.4 G/DL — SIGNIFICANT CHANGE UP (ref 6–8.3)
RBC # BLD: 4.72 M/UL — SIGNIFICANT CHANGE UP (ref 3.8–5.2)
RBC # FLD: 13.2 % — SIGNIFICANT CHANGE UP (ref 10.3–14.5)
SODIUM SERPL-SCNC: 140 MMOL/L — SIGNIFICANT CHANGE UP (ref 135–145)
WBC # BLD: 12.4 K/UL — HIGH (ref 3.8–10.5)
WBC # FLD AUTO: 12.4 K/UL — HIGH (ref 3.8–10.5)

## 2020-04-06 RX ORDER — ANAKINRA 100MG/0.67
100 SYRINGE (ML) SUBCUTANEOUS
Refills: 0 | Status: COMPLETED | OUTPATIENT
Start: 2020-04-06 | End: 2020-04-09

## 2020-04-06 RX ADMIN — Medication 20 MILLIGRAM(S): at 05:54

## 2020-04-06 RX ADMIN — ENOXAPARIN SODIUM 40 MILLIGRAM(S): 100 INJECTION SUBCUTANEOUS at 12:25

## 2020-04-06 RX ADMIN — LISINOPRIL 20 MILLIGRAM(S): 2.5 TABLET ORAL at 05:55

## 2020-04-06 RX ADMIN — Medication 100 MILLIGRAM(S): at 17:41

## 2020-04-06 RX ADMIN — DORZOLAMIDE HYDROCHLORIDE TIMOLOL MALEATE 1 DROP(S): 20; 5 SOLUTION/ DROPS OPHTHALMIC at 17:35

## 2020-04-06 RX ADMIN — DORZOLAMIDE HYDROCHLORIDE TIMOLOL MALEATE 1 DROP(S): 20; 5 SOLUTION/ DROPS OPHTHALMIC at 05:54

## 2020-04-06 RX ADMIN — Medication 30 MILLIGRAM(S): at 17:34

## 2020-04-06 RX ADMIN — LATANOPROST 1 DROP(S): 0.05 SOLUTION/ DROPS OPHTHALMIC; TOPICAL at 21:36

## 2020-04-06 RX ADMIN — Medication 200 MILLIGRAM(S): at 12:25

## 2020-04-06 RX ADMIN — Medication 300 MILLIGRAM(S): at 12:25

## 2020-04-06 RX ADMIN — AZITHROMYCIN 250 MILLIGRAM(S): 500 TABLET, FILM COATED ORAL at 02:42

## 2020-04-06 RX ADMIN — Medication 40 MILLIGRAM(S): at 05:55

## 2020-04-06 NOTE — PROGRESS NOTE ADULT - SUBJECTIVE AND OBJECTIVE BOX
Contact Information:  Gerson Ware II, MD, MPH  PGY-1, Internal Medicine  Pager: [phone] ([facility]) /// [phone] ([facility])    KIMBERLY KUMAR, MRN-[id number]    Patient is a 69y old  Female who presents with a chief complaint of 69F p/w sob and fever (05 Apr 2020 11:55)      OVERNIGHT EVENTS:    SUBJECTIVE:    CONSTITUTIONAL: No weakness. No fatigue. No fever.  HEAD: No head trauma.   EYES: No vision changes.  ENT: No hearing changes or tinnitus. No ear pain. No changes in smell. No nasal congestion or discharge. No sore throat. No voice hoarseness.   NECK: No neck pain or stiffness. No lumps.  RESPIRATORY: No cough. No SOB. No wheezing. No hemoptysis.   CARDIOVASCULAR: No chest pain. No palpitations.   GASTROINTESTINAL: No dysphagia. No ABD pain. No distension. No constipation. No diarrhea. No pain with defecation. No hematemesis. No hematochezia or melena.  BACK: No back pain.  GENITOURINARY: No dysuria. No frequency or urgency. No hesitancy. No incontinence. No urinary retention. No suprapubic pain. No hematuria.  EXTREMITY: No swelling.  MUSCULOSKELETAL: No joint pain or swelling. No fractures. No stiffness.    SKIN: No rashes. No itching. No skin, hair, or nail changes.  NEUROLOGICAL: No weakness or paralysis. No lightheadedness or dizziness. No HA. No numbness or tingling.   PSYCHIATRIC: No depression.       OBJECTIVE:  Vital Signs Last 24 Hrs  T(C): 36.6 (06 Apr 2020 05:11), Max: 36.7 (05 Apr 2020 14:35)  T(F): 97.8 (06 Apr 2020 05:11), Max: 98 (05 Apr 2020 14:35)  HR: 92 (06 Apr 2020 05:11) (79 - 92)  BP: 150/87 (06 Apr 2020 05:11) (150/87 - 158/90)  BP(mean): --  RR: 20 (06 Apr 2020 05:11) (18 - 20)  SpO2: 89% (06 Apr 2020 05:11) (89% - 91%)  I&O's Summary    05 Apr 2020 07:01  -  06 Apr 2020 07:00  --------------------------------------------------------  IN: 250 mL / OUT: 400 mL / NET: -150 mL        MEDICATIONS  (STANDING):  allopurinol 300 milliGRAM(s) Oral daily  azithromycin  IVPB 500 milliGRAM(s) IV Intermittent every 24 hours  azithromycin  IVPB      dorzolamide 2%/timolol 0.5% Ophthalmic Solution 1 Drop(s) Both EYES two times a day  enoxaparin Injectable 40 milliGRAM(s) SubCutaneous daily  furosemide   Injectable 20 milliGRAM(s) IV Push daily  hydroxychloroquine   Oral   hydroxychloroquine 200 milliGRAM(s) Oral every 12 hours  latanoprost 0.005% Ophthalmic Solution 1 Drop(s) Both EYES at bedtime  lisinopril 20 milliGRAM(s) Oral daily  methylPREDNISolone sodium succinate Injectable 40 milliGRAM(s) IV Push two times a day    MEDICATIONS  (PRN):  acetaminophen   Tablet .. 650 milliGRAM(s) Oral every 6 hours PRN Temp greater or equal to 38C (100.4F)  benzocaine 15 mG/menthol 3.6 mG (Sugar-Free) Lozenge 1 Lozenge Oral four times a day PRN Cough  benzonatate 100 milliGRAM(s) Oral three times a day PRN Cough  guaiFENesin   Syrup  (Sugar-Free) 200 milliGRAM(s) Oral four times a day PRN Cough    Allergies    neomycin (Rash)    Intolerances        CONSTITUTIONAL: No acute distress. Awake and alert.  HEAD: No evidence of trauma. Structures WNL.  EYES: +PERRL. +EOMI. No scleral icterus. No conjunctival injection.  ENT: Moist oral mucosa. No erythema. No pharyngeal exudates.   NECK: Supple. Appropriate ROM. No stiffness. No masses or lymphadenopathy.  RESPIRATORY: CTAB. No wheezes, rales, or rhonchi. No accessory muscle use. No apparent respiratory distress.  CARDIOVASCULAR: +S1/S2. No audible S3/S4. Regular rate and rhythm. No murmurs, rubs, or gallops. 2+ radial pulses x b/l UE; 2+ DP pulses x b/l LE.   GASTROINTESTINAL: Soft, nontender, nondistended. +BS. No rebound or guarding.   BACK: No spinal or paraspinal tenderness. No CVA tenderness.  EXTREMITY: No LE swelling or edema. EXTs warm to touch.  MUSCULOSKELETAL: Spontaneous movement in all extremities.  DERMATOLOGICAL: No abnormal rashes or lesions.  NEUROLOGICAL: CN 2-12 grossly intact. No focal deficits. Sensation intact x 4EXT. A&Ox3 (oriented to person, place, and time).  PSYCHIATRIC: Appropriate affect.                            14.7   13.07 )-----------( 539      ( 05 Apr 2020 11:08 )             45.9       04-05    138  |  90<L>  |  37<H>  ----------------------------<  222<H>  3.6   |  31  |  0.86    Ca    10.0      05 Apr 2020 11:08  Phos  3.7     04-05  Mg     2.3     04-05    TPro  7.5  /  Alb  3.3  /  TBili  0.5  /  DBili  x   /  AST  195<H>  /  ALT  325<H>  /  AlkPhos  175<H>  04-05    CAPILLARY BLOOD GLUCOSE        LIVER FUNCTIONS - ( 05 Apr 2020 11:08 )  Alb: 3.3 g/dL / Pro: 7.5 g/dL / ALK PHOS: 175 U/L / ALT: 325 U/L / AST: 195 U/L / GGT: x                       RADIOLOGY AND ADDITIONAL TESTS:    CONSULTANT NOTES REVIEWED:    CARE DISCUSSED WITH THE FOLLOWING CONSULTANTS/PROVIDERS: Contact Information:  Gerson Ware II, MD, MPH  PGY-1, Internal Medicine  Pager: [phone] ([facility]) /// [phone] ([facility])    KIMBERLY KUMAR, MRN-[id number]    Patient is a 69y old  Female who presents with a chief complaint of 69F p/w sob and fever (05 Apr 2020 11:55)      OVERNIGHT EVENTS: No overnight events.    SUBJECTIVE: Patient evaluated at bedside, c/o persistent SOB but otherwise stable. She denies lightheadedness, dizziness, fever, N/V, ABD pain/CP.     ROS  RESPIRATORY: +SOB.      OBJECTIVE:  Vital Signs Last 24 Hrs  T(C): 36.6 (06 Apr 2020 05:11), Max: 36.7 (05 Apr 2020 14:35)  T(F): 97.8 (06 Apr 2020 05:11), Max: 98 (05 Apr 2020 14:35)  HR: 92 (06 Apr 2020 05:11) (79 - 92)  BP: 150/87 (06 Apr 2020 05:11) (150/87 - 158/90)  BP(mean): --  RR: 20 (06 Apr 2020 05:11) (18 - 20)  SpO2: 89% (06 Apr 2020 05:11) (89% - 91%)  I&O's Summary    05 Apr 2020 07:01  -  06 Apr 2020 07:00  --------------------------------------------------------  IN: 250 mL / OUT: 400 mL / NET: -150 mL        MEDICATIONS  (STANDING):  allopurinol 300 milliGRAM(s) Oral daily  azithromycin  IVPB 500 milliGRAM(s) IV Intermittent every 24 hours  azithromycin  IVPB      dorzolamide 2%/timolol 0.5% Ophthalmic Solution 1 Drop(s) Both EYES two times a day  enoxaparin Injectable 40 milliGRAM(s) SubCutaneous daily  furosemide   Injectable 20 milliGRAM(s) IV Push daily  hydroxychloroquine   Oral   hydroxychloroquine 200 milliGRAM(s) Oral every 12 hours  latanoprost 0.005% Ophthalmic Solution 1 Drop(s) Both EYES at bedtime  lisinopril 20 milliGRAM(s) Oral daily  methylPREDNISolone sodium succinate Injectable 40 milliGRAM(s) IV Push two times a day    MEDICATIONS  (PRN):  acetaminophen   Tablet .. 650 milliGRAM(s) Oral every 6 hours PRN Temp greater or equal to 38C (100.4F)  benzocaine 15 mG/menthol 3.6 mG (Sugar-Free) Lozenge 1 Lozenge Oral four times a day PRN Cough  benzonatate 100 milliGRAM(s) Oral three times a day PRN Cough  guaiFENesin   Syrup  (Sugar-Free) 200 milliGRAM(s) Oral four times a day PRN Cough    Allergies    neomycin (Rash)    Intolerances        CONSTITUTIONAL: Tired appearing. Slight distress due to SOB.  EYES: No scleral icterus. No conjunctival injection.  ENT: Moist oral mucosa.  RESPIRATORY: Decreased lung sounds posteriorly, particularly on left side. No crackles auscultated. Slight respiratory distress evident.  CARDIOVASCULAR: +S1/S2. No audible S3/S4. Regular rate and rhythm. No murmurs, rubs, or gallops.   GASTROINTESTINAL: Soft, nontender, nondistended. +BS. No rebound or guarding.   EXTREMITY: No LE swelling or edema. EXTs warm to touch.  MUSCULOSKELETAL: Spontaneous movement in all extremities.  DERMATOLOGICAL: No abnormal rashes or lesions.  NEUROLOGICAL: Nonfocal.                            14.7   13.07 )-----------( 539      ( 05 Apr 2020 11:08 )             45.9       04-05    138  |  90<L>  |  37<H>  ----------------------------<  222<H>  3.6   |  31  |  0.86    Ca    10.0      05 Apr 2020 11:08  Phos  3.7     04-05  Mg     2.3     04-05    TPro  7.5  /  Alb  3.3  /  TBili  0.5  /  DBili  x   /  AST  195<H>  /  ALT  325<H>  /  AlkPhos  175<H>  04-05    CAPILLARY BLOOD GLUCOSE        LIVER FUNCTIONS - ( 05 Apr 2020 11:08 )  Alb: 3.3 g/dL / Pro: 7.5 g/dL / ALK PHOS: 175 U/L / ALT: 325 U/L / AST: 195 U/L / GGT: x                       RADIOLOGY AND ADDITIONAL TESTS:    CONSULTANT NOTES REVIEWED:    CARE DISCUSSED WITH THE FOLLOWING CONSULTANTS/PROVIDERS:

## 2020-04-06 NOTE — PROGRESS NOTE ADULT - PROBLEM SELECTOR PLAN 1
- CRP and ferritin downtrending  - C/w Plaquenil (4/2 - ), azithromycin (4/1 - ), anakinra (4/2 - ), ceftriaxone (4/1 - 4/5), solumedrol 4/3 - 4/5)    Interval events:  - Presents with fevers, chills, dry cough, SOB  - COVID+ - CRP and ferritin downtrending  - S/p azithro (4/1 - 4/6), plaquenil (4/2 - ), solumedrol 40 BID (4/3 - 4/6)  - C/w anakinra (4/2 - ), ceftriaxone (4/1 - 4/5), solumedrol 30 BID (4/6 - )    Interval events:  - Presents with fevers, chills, dry cough, SOB  - COVID+

## 2020-04-06 NOTE — PROGRESS NOTE ADULT - PROBLEM SELECTOR PLAN 1
CRP and ferritin continue to improve  continue steroids and Kineret      Interval events:  Presents with fevers, chills, dry cough, SOB  COVID+

## 2020-04-06 NOTE — PROGRESS NOTE ADULT - PROBLEM SELECTOR PLAN 2
Currently on NRB 15 + NC 15   Aim for O2 sat > 90%  Management as above Currently on NRB 15 + NC 15     sats have been > 90

## 2020-04-06 NOTE — PROGRESS NOTE ADULT - PROBLEM SELECTOR PLAN 5
- c/w lisinopril 20 (Per WHO guidelines there is no recommendation to discontinue ace-inhibitor medication)  - HCTZ/Triamterene is dosed every other day outpatient, will hold for now continue to monitor and adjust as needed

## 2020-04-06 NOTE — PROGRESS NOTE ADULT - SUBJECTIVE AND OBJECTIVE BOX
70 yo F with PMhx of HTN and prediabetes presents to the ED with complaints of fever, chills, dry cough and worsening SOB. Patient reports that she has been feeling febrile for the last 2 weeks. She has intermittent chills and dry cough. She feels chest pressure when she coughs. Her SOB has been worsening. She becomes short of breath when she walks to the bathroom from the bed. She also reports to have orthopnea and LE edema. She has been sleeping in the chair for the last couple of days. She denies any recent travel. She went to [place] before her symptoms and thinks that she was exposed there. She reports to have HTN and prediabetes. She had C-sectionsx2 in the past. No family hx of heart or lung disease. Lives with her . Able to take care of ADLs. No hx of smoking or chronic alcohol consumptions. Allergic to neuomycin-develops rash and swelling. Patient with increasing shortness of breath. Patient continue to need 100 % NRB. continue to follow O2Sat. Patient unchanged today. WBC count slightly elevated    MEDICATIONS  (STANDING):  allopurinol 300 milliGRAM(s) Oral daily  anakinra Injectable 100 milliGRAM(s) SubCutaneous two times a day  dorzolamide 2%/timolol 0.5% Ophthalmic Solution 1 Drop(s) Both EYES two times a day  enoxaparin Injectable 40 milliGRAM(s) SubCutaneous daily  latanoprost 0.005% Ophthalmic Solution 1 Drop(s) Both EYES at bedtime  lisinopril 20 milliGRAM(s) Oral daily  methylPREDNISolone sodium succinate Injectable 30 milliGRAM(s) IV Push two times a day    MEDICATIONS  (PRN):  acetaminophen   Tablet .. 650 milliGRAM(s) Oral every 6 hours PRN Temp greater or equal to 38C (100.4F)  benzocaine 15 mG/menthol 3.6 mG (Sugar-Free) Lozenge 1 Lozenge Oral four times a day PRN Cough  benzonatate 100 milliGRAM(s) Oral three times a day PRN Cough  guaiFENesin   Syrup  (Sugar-Free) 200 milliGRAM(s) Oral four times a day PRN Cough          VITALS:   T(C): 36.6 (04-06-20 @ 05:11), Max: 36.6 (04-05-20 @ 22:23)  HR: 92 (04-06-20 @ 05:11) (79 - 92)  BP: 150/87 (04-06-20 @ 05:11) (150/87 - 158/90)  RR: 20 (04-06-20 @ 05:11) (18 - 20)  SpO2: 89% (04-06-20 @ 05:11) (89% - 91%)  Wt(kg): --    PHYSICAL EXAM:  GENERAL: NAD, well nourished and conversant  HEAD:  Atraumatic  EYES: EOM, PERRLA, conjunctiva pink and sclera white  ENT: No tonsillar erythema, exudates, or enlargement, moist mucous membranes, good dentition, no lesions  NECK: Supple, No JVD, normal thyroid, carotids with normal upstrokes and no bruits  CHEST/LUNG: decreased BS with soft rhonchi  HEART: Regular rate and rhythm, No murmurs, rubs, or gallops  ABDOMEN: Soft, nondistended, no masses, guarding, tenderness or rebound, bowel sounds present  EXTREMITIES:  2+ Peripheral Pulses, No clubbing, cyanosis, or edema.   LYMPH: No lymphadenopathy noted  SKIN: No rashes or lesions  NERVOUS SYSTEM:  Alert & Oriented X3, normal cognitive function. Motor Strength 5/5 right upper and right lower.  5/5 left upper and left lower extremities, DTRs 2+ intact and symmetric    LABS:        CBC Full  -  ( 06 Apr 2020 10:42 )  WBC Count : 12.40 K/uL  RBC Count : 4.72 M/uL  Hemoglobin : 14.3 g/dL  Hematocrit : 44.1 %  Platelet Count - Automated : 558 K/uL  Mean Cell Volume : 93.4 fl  Mean Cell Hemoglobin : 30.3 pg  Mean Cell Hemoglobin Concentration : 32.4 gm/dL  Auto Neutrophil # : 11.33 K/uL  Auto Lymphocyte # : 0.69 K/uL  Auto Monocyte # : 0.28 K/uL  Auto Eosinophil # : 0.00 K/uL  Auto Basophil # : 0.02 K/uL  Auto Neutrophil % : 91.3 %  Auto Lymphocyte % : 5.6 %  Auto Monocyte % : 2.3 %  Auto Eosinophil % : 0.0 %  Auto Basophil % : 0.2 %    04-06    140  |  93<L>  |  38<H>  ----------------------------<  222<H>  4.2   |  31  |  0.80    Ca    9.7      06 Apr 2020 10:42  Phos  3.5     04-06  Mg     2.3     04-06    TPro  7.4  /  Alb  3.2<L>  /  TBili  0.6  /  DBili  x   /  AST  97<H>  /  ALT  244<H>  /  AlkPhos  167<H>  04-06    LIVER FUNCTIONS - ( 06 Apr 2020 10:42 )  Alb: 3.2 g/dL / Pro: 7.4 g/dL / ALK PHOS: 167 U/L / ALT: 244 U/L / AST: 97 U/L / GGT: x               CAPILLARY BLOOD GLUCOSE          RADIOLOGY & ADDITIONAL TESTS: 70 yo F with PMhx of HTN and prediabetes presents to the ED with complaints of fever, chills, dry cough and worsening SOB. Patient reports that she has been feeling febrile for the last 2 weeks. She has intermittent chills and dry cough. She feels chest pressure when she coughs. Her SOB has been worsening. She becomes short of breath when she walks to the bathroom from the bed. She also reports to have orthopnea and LE edema. She has been sleeping in the chair for the last couple of days. She denies any recent travel. She went to [place] [facility] before her symptoms and thinks that she was exposed there. She reports to have HTN and prediabetes. She had C-sectionsx2 in the past. No family hx of heart or lung disease. Lives with her . Able to take care of ADLs. No hx of smoking or chronic alcohol consumptions. Allergic to neuomycin-develops rash and swelling. Patient with increasing shortness of breath. Patient continue to need 100 % NRB. continue to follow O2Sat. Patient unchanged today. WBC count slightly elevated.    MEDICATIONS  (STANDING):  allopurinol 300 milliGRAM(s) Oral daily  anakinra Injectable 100 milliGRAM(s) SubCutaneous two times a day  dorzolamide 2%/timolol 0.5% Ophthalmic Solution 1 Drop(s) Both EYES two times a day  enoxaparin Injectable 40 milliGRAM(s) SubCutaneous daily  latanoprost 0.005% Ophthalmic Solution 1 Drop(s) Both EYES at bedtime  lisinopril 20 milliGRAM(s) Oral daily  methylPREDNISolone sodium succinate Injectable 30 milliGRAM(s) IV Push two times a day    MEDICATIONS  (PRN):  acetaminophen   Tablet .. 650 milliGRAM(s) Oral every 6 hours PRN Temp greater or equal to 38C (100.4F)  benzocaine 15 mG/menthol 3.6 mG (Sugar-Free) Lozenge 1 Lozenge Oral four times a day PRN Cough  benzonatate 100 milliGRAM(s) Oral three times a day PRN Cough  guaiFENesin   Syrup  (Sugar-Free) 200 milliGRAM(s) Oral four times a day PRN Cough          VITALS:   T(C): 36.6 (04-06-20 @ 05:11), Max: 36.6 (04-05-20 @ 22:23)  HR: 92 (04-06-20 @ 05:11) (79 - 92)  BP: 150/87 (04-06-20 @ 05:11) (150/87 - 158/90)  RR: 20 (04-06-20 @ 05:11) (18 - 20)  SpO2: 89% (04-06-20 @ 05:11) (89% - 91%)  Wt(kg): --    PHYSICAL EXAM:  GENERAL: NAD, well nourished and conversant  HEAD:  Atraumatic  EYES: EOM, PERRLA, conjunctiva pink and sclera white  ENT: No tonsillar erythema, exudates, or enlargement, moist mucous membranes, good dentition, no lesions  NECK: Supple, No JVD, normal thyroid, carotids with normal upstrokes and no bruits  CHEST/LUNG: decreased BS with soft rhonchi  HEART: Regular rate and rhythm, No murmurs, rubs, or gallops  ABDOMEN: Soft, nondistended, no masses, guarding, tenderness or rebound, bowel sounds present  EXTREMITIES:  2+ Peripheral Pulses, No clubbing, cyanosis, or edema.   LYMPH: No lymphadenopathy noted  SKIN: No rashes or lesions  NERVOUS SYSTEM:  Alert & Oriented X3, normal cognitive function. Motor Strength 5/5 right upper and right lower.  5/5 left upper and left lower extremities, DTRs 2+ intact and symmetric    LABS:        CBC Full  -  ( 06 Apr 2020 10:42 )  WBC Count : 12.40 K/uL  RBC Count : 4.72 M/uL  Hemoglobin : 14.3 g/dL  Hematocrit : 44.1 %  Platelet Count - Automated : 558 K/uL  Mean Cell Volume : 93.4 fl  Mean Cell Hemoglobin : 30.3 pg  Mean Cell Hemoglobin Concentration : 32.4 gm/dL  Auto Neutrophil # : 11.33 K/uL  Auto Lymphocyte # : 0.69 K/uL  Auto Monocyte # : 0.28 K/uL  Auto Eosinophil # : 0.00 K/uL  Auto Basophil # : 0.02 K/uL  Auto Neutrophil % : 91.3 %  Auto Lymphocyte % : 5.6 %  Auto Monocyte % : 2.3 %  Auto Eosinophil % : 0.0 %  Auto Basophil % : 0.2 %    04-06    140  |  93<L>  |  38<H>  ----------------------------<  222<H>  4.2   |  31  |  0.80    Ca    9.7      06 Apr 2020 10:42  Phos  3.5     04-06  Mg     2.3     04-06    TPro  7.4  /  Alb  3.2<L>  /  TBili  0.6  /  DBili  x   /  AST  97<H>  /  ALT  244<H>  /  AlkPhos  167<H>  04-06    LIVER FUNCTIONS - ( 06 Apr 2020 10:42 )  Alb: 3.2 g/dL / Pro: 7.4 g/dL / ALK PHOS: 167 U/L / ALT: 244 U/L / AST: 97 U/L / GGT: x               CAPILLARY BLOOD GLUCOSE          RADIOLOGY & ADDITIONAL TESTS:

## 2020-04-06 NOTE — CHART NOTE - NSCHARTNOTEFT_GEN_A_CORE
Biotel Monitoring Note:    COVID Patient on hydroxychloroquine    Biotel Reading on: 4/3  QTc measurement: 433ms    Biotel Reading on: 4/4  QTc measurement: 428ms    Biotel Reading on: 4/5  QTc measurement: 459ms      No further need for biotel monitoring as QTc less than 500ms.

## 2020-04-07 LAB
ALBUMIN SERPL ELPH-MCNC: 3.3 G/DL — SIGNIFICANT CHANGE UP (ref 3.3–5)
ALP SERPL-CCNC: 156 U/L — HIGH (ref 40–120)
ALT FLD-CCNC: 194 U/L — HIGH (ref 10–45)
ANION GAP SERPL CALC-SCNC: 15 MMOL/L — SIGNIFICANT CHANGE UP (ref 5–17)
AST SERPL-CCNC: 65 U/L — HIGH (ref 10–40)
BASOPHILS # BLD AUTO: 0.03 K/UL — SIGNIFICANT CHANGE UP (ref 0–0.2)
BASOPHILS NFR BLD AUTO: 0.3 % — SIGNIFICANT CHANGE UP (ref 0–2)
BILIRUB SERPL-MCNC: 0.6 MG/DL — SIGNIFICANT CHANGE UP (ref 0.2–1.2)
BUN SERPL-MCNC: 40 MG/DL — HIGH (ref 7–23)
CALCIUM SERPL-MCNC: 9.9 MG/DL — SIGNIFICANT CHANGE UP (ref 8.4–10.5)
CHLORIDE SERPL-SCNC: 93 MMOL/L — LOW (ref 96–108)
CO2 SERPL-SCNC: 32 MMOL/L — HIGH (ref 22–31)
CREAT SERPL-MCNC: 0.78 MG/DL — SIGNIFICANT CHANGE UP (ref 0.5–1.3)
CRP SERPL-MCNC: 4.9 MG/DL — HIGH (ref 0–0.4)
EOSINOPHIL # BLD AUTO: 0 K/UL — SIGNIFICANT CHANGE UP (ref 0–0.5)
EOSINOPHIL NFR BLD AUTO: 0 % — SIGNIFICANT CHANGE UP (ref 0–6)
FERRITIN SERPL-MCNC: 1811 NG/ML — HIGH (ref 15–150)
GLUCOSE SERPL-MCNC: 140 MG/DL — HIGH (ref 70–99)
HCT VFR BLD CALC: 45.6 % — HIGH (ref 34.5–45)
HGB BLD-MCNC: 15 G/DL — SIGNIFICANT CHANGE UP (ref 11.5–15.5)
IMM GRANULOCYTES NFR BLD AUTO: 0.7 % — SIGNIFICANT CHANGE UP (ref 0–1.5)
LYMPHOCYTES # BLD AUTO: 0.95 K/UL — LOW (ref 1–3.3)
LYMPHOCYTES # BLD AUTO: 8.8 % — LOW (ref 13–44)
MAGNESIUM SERPL-MCNC: 2.5 MG/DL — SIGNIFICANT CHANGE UP (ref 1.6–2.6)
MCHC RBC-ENTMCNC: 30.7 PG — SIGNIFICANT CHANGE UP (ref 27–34)
MCHC RBC-ENTMCNC: 32.9 GM/DL — SIGNIFICANT CHANGE UP (ref 32–36)
MCV RBC AUTO: 93.3 FL — SIGNIFICANT CHANGE UP (ref 80–100)
MONOCYTES # BLD AUTO: 0.34 K/UL — SIGNIFICANT CHANGE UP (ref 0–0.9)
MONOCYTES NFR BLD AUTO: 3.2 % — SIGNIFICANT CHANGE UP (ref 2–14)
NEUTROPHILS # BLD AUTO: 9.34 K/UL — HIGH (ref 1.8–7.4)
NEUTROPHILS NFR BLD AUTO: 87 % — HIGH (ref 43–77)
NRBC # BLD: 0 /100 WBCS — SIGNIFICANT CHANGE UP (ref 0–0)
PHOSPHATE SERPL-MCNC: 3.4 MG/DL — SIGNIFICANT CHANGE UP (ref 2.5–4.5)
PLATELET # BLD AUTO: 516 K/UL — HIGH (ref 150–400)
POTASSIUM SERPL-MCNC: 4 MMOL/L — SIGNIFICANT CHANGE UP (ref 3.5–5.3)
POTASSIUM SERPL-SCNC: 4 MMOL/L — SIGNIFICANT CHANGE UP (ref 3.5–5.3)
PROT SERPL-MCNC: 7.3 G/DL — SIGNIFICANT CHANGE UP (ref 6–8.3)
RBC # BLD: 4.89 M/UL — SIGNIFICANT CHANGE UP (ref 3.8–5.2)
RBC # FLD: 13.2 % — SIGNIFICANT CHANGE UP (ref 10.3–14.5)
SODIUM SERPL-SCNC: 140 MMOL/L — SIGNIFICANT CHANGE UP (ref 135–145)
WBC # BLD: 10.74 K/UL — HIGH (ref 3.8–10.5)
WBC # FLD AUTO: 10.74 K/UL — HIGH (ref 3.8–10.5)

## 2020-04-07 RX ORDER — FUROSEMIDE 40 MG
20 TABLET ORAL DAILY
Refills: 0 | Status: DISCONTINUED | OUTPATIENT
Start: 2020-04-07 | End: 2020-04-16

## 2020-04-07 RX ORDER — FUROSEMIDE 40 MG
20 TABLET ORAL DAILY
Refills: 0 | Status: DISCONTINUED | OUTPATIENT
Start: 2020-04-07 | End: 2020-04-07

## 2020-04-07 RX ADMIN — LISINOPRIL 20 MILLIGRAM(S): 2.5 TABLET ORAL at 06:19

## 2020-04-07 RX ADMIN — DORZOLAMIDE HYDROCHLORIDE TIMOLOL MALEATE 1 DROP(S): 20; 5 SOLUTION/ DROPS OPHTHALMIC at 06:19

## 2020-04-07 RX ADMIN — LATANOPROST 1 DROP(S): 0.05 SOLUTION/ DROPS OPHTHALMIC; TOPICAL at 22:17

## 2020-04-07 RX ADMIN — Medication 100 MILLIGRAM(S): at 06:19

## 2020-04-07 RX ADMIN — Medication 30 MILLIGRAM(S): at 18:10

## 2020-04-07 RX ADMIN — Medication 20 MILLIGRAM(S): at 18:09

## 2020-04-07 RX ADMIN — Medication 30 MILLIGRAM(S): at 06:19

## 2020-04-07 RX ADMIN — DORZOLAMIDE HYDROCHLORIDE TIMOLOL MALEATE 1 DROP(S): 20; 5 SOLUTION/ DROPS OPHTHALMIC at 18:09

## 2020-04-07 RX ADMIN — Medication 100 MILLIGRAM(S): at 18:09

## 2020-04-07 RX ADMIN — Medication 300 MILLIGRAM(S): at 12:24

## 2020-04-07 RX ADMIN — ENOXAPARIN SODIUM 40 MILLIGRAM(S): 100 INJECTION SUBCUTANEOUS at 12:24

## 2020-04-07 NOTE — PROGRESS NOTE ADULT - SUBJECTIVE AND OBJECTIVE BOX
68 yo F with PMhx of HTN and prediabetes presents to the ED with complaints of fever, chills, dry cough and worsening SOB. Patient reports that she has been feeling febrile for the last 2 weeks. She has intermittent chills and dry cough. She feels chest pressure when she coughs. Her SOB has been worsening. She becomes short of breath when she walks to the bathroom from the bed. She also reports to have orthopnea and LE edema. She has been sleeping in the chair for the last couple of days. She denies any recent travel. She went to [place] before her symptoms and thinks that she was exposed there. She reports to have HTN and prediabetes. She had C-sectionsx2 in the past. No family hx of heart or lung disease. Lives with her . Able to take care of ADLs. No hx of smoking or chronic alcohol consumptions. Allergic to neuomycin-develops rash and swelling. Patient with increasing shortness of breath. Patient continue to need 100 % NRB. continue to follow O2Sat. Patient unchanged today. WBC count slightly elevated.68 yo F with PMhx of HTN and prediabetes presents to the ED with complaints of fever, chills, dry cough and worsening SOB. Patient reports that she has been feeling febrile for the last 2 weeks. She has intermittent chills and dry cough. She feels chest pressure when she coughs. Her SOB has been worsening. She becomes short of breath when she walks to the bathroom from the bed. She also reports to have orthopnea and LE edema. She has been sleeping in the chair for the last couple of days. She denies any recent travel. She went to [place] before her symptoms and thinks that she was exposed there. She reports to have HTN and prediabetes. She had C-sectionsx2 in the past. No family hx of heart or lung disease. Lives with her . Able to take care of ADLs. No hx of smoking or chronic alcohol consumptions. Allergic to neuomycin-develops rash and swelling. Patient with increasing shortness of breath. Patient continue to need 100 % NRB. continue to follow O2Sat. Patient unchanged today. WBC count slightly elevated.    MEDICATIONS  (STANDING):  allopurinol 300 milliGRAM(s) Oral daily  anakinra Injectable 100 milliGRAM(s) SubCutaneous two times a day  dorzolamide 2%/timolol 0.5% Ophthalmic Solution 1 Drop(s) Both EYES two times a day  enoxaparin Injectable 40 milliGRAM(s) SubCutaneous daily  latanoprost 0.005% Ophthalmic Solution 1 Drop(s) Both EYES at bedtime  lisinopril 20 milliGRAM(s) Oral daily  methylPREDNISolone sodium succinate Injectable 30 milliGRAM(s) IV Push two times a day    MEDICATIONS  (PRN):  acetaminophen   Tablet .. 650 milliGRAM(s) Oral every 6 hours PRN Temp greater or equal to 38C (100.4F)  benzocaine 15 mG/menthol 3.6 mG (Sugar-Free) Lozenge 1 Lozenge Oral four times a day PRN Cough  benzonatate 100 milliGRAM(s) Oral three times a day PRN Cough  guaiFENesin   Syrup  (Sugar-Free) 200 milliGRAM(s) Oral four times a day PRN Cough          VITALS:   T(C): 36.6 (04-07-20 @ 04:52), Max: 36.6 (04-06-20 @ 21:53)  HR: 82 (04-07-20 @ 04:52) (82 - 88)  BP: 137/82 (04-07-20 @ 04:52) (137/82 - 139/76)  RR: 18 (04-07-20 @ 04:52) (18 - 18)  SpO2: 92% (04-07-20 @ 04:52) (89% - 92%)  Wt(kg): --    PHYSICAL EXAM:  GENERAL: NAD, well nourished and conversant  HEAD:  Atraumatic  EYES: EOM, PERRLA, conjunctiva pink and sclera white  ENT: No tonsillar erythema, exudates, or enlargement, moist mucous membranes, good dentition, no lesions  NECK: Supple, No JVD, normal thyroid, carotids with normal upstrokes and no bruits  CHEST/LUNG: decreased BS with soft rhonchi  HEART: Regular rate and rhythm, No murmurs, rubs, or gallops  ABDOMEN: Soft, nondistended, no masses, guarding, tenderness or rebound, bowel sounds present  EXTREMITIES:  2+ Peripheral Pulses, No clubbing, cyanosis, or edema.   LYMPH: No lymphadenopathy noted  SKIN: No rashes or lesions  NERVOUS SYSTEM:  Alert & Oriented X3, normal cognitive function. Motor Strength 5/5 right upper and right lower.  5/5 left upper and left lower extremities, DTRs 2+ intact and symmetric      LABS:        CBC Full  -  ( 07 Apr 2020 06:12 )  WBC Count : 10.74 K/uL  RBC Count : 4.89 M/uL  Hemoglobin : 15.0 g/dL  Hematocrit : 45.6 %  Platelet Count - Automated : 516 K/uL  Mean Cell Volume : 93.3 fl  Mean Cell Hemoglobin : 30.7 pg  Mean Cell Hemoglobin Concentration : 32.9 gm/dL  Auto Neutrophil # : 9.34 K/uL  Auto Lymphocyte # : 0.95 K/uL  Auto Monocyte # : 0.34 K/uL  Auto Eosinophil # : 0.00 K/uL  Auto Basophil # : 0.03 K/uL  Auto Neutrophil % : 87.0 %  Auto Lymphocyte % : 8.8 %  Auto Monocyte % : 3.2 %  Auto Eosinophil % : 0.0 %  Auto Basophil % : 0.3 %    04-07    140  |  93<L>  |  40<H>  ----------------------------<  140<H>  4.0   |  32<H>  |  0.78    Ca    9.9      07 Apr 2020 06:12  Phos  3.4     04-07  Mg     2.5     04-07    TPro  7.3  /  Alb  3.3  /  TBili  0.6  /  DBili  x   /  AST  65<H>  /  ALT  194<H>  /  AlkPhos  156<H>  04-07    LIVER FUNCTIONS - ( 07 Apr 2020 06:12 )  Alb: 3.3 g/dL / Pro: 7.3 g/dL / ALK PHOS: 156 U/L / ALT: 194 U/L / AST: 65 U/L / GGT: x               CAPILLARY BLOOD GLUCOSE          RADIOLOGY & ADDITIONAL TESTS:

## 2020-04-07 NOTE — PROGRESS NOTE ADULT - PROBLEM SELECTOR PLAN 1
CRP and ferritin continue to improve  continue steroids and Kineret  O2 sats are unchanged        Interval events:  Presents with fevers, chills, dry cough, SOB  COVID+

## 2020-04-08 DIAGNOSIS — Z29.9 ENCOUNTER FOR PROPHYLACTIC MEASURES, UNSPECIFIED: ICD-10-CM

## 2020-04-08 LAB
ANION GAP SERPL CALC-SCNC: 16 MMOL/L — SIGNIFICANT CHANGE UP (ref 5–17)
BUN SERPL-MCNC: 36 MG/DL — HIGH (ref 7–23)
CALCIUM SERPL-MCNC: 9.7 MG/DL — SIGNIFICANT CHANGE UP (ref 8.4–10.5)
CHLORIDE SERPL-SCNC: 91 MMOL/L — LOW (ref 96–108)
CO2 SERPL-SCNC: 32 MMOL/L — HIGH (ref 22–31)
CREAT SERPL-MCNC: 0.72 MG/DL — SIGNIFICANT CHANGE UP (ref 0.5–1.3)
FERRITIN SERPL-MCNC: 1327 NG/ML — HIGH (ref 15–150)
GLUCOSE SERPL-MCNC: 155 MG/DL — HIGH (ref 70–99)
HCT VFR BLD CALC: 46.5 % — HIGH (ref 34.5–45)
HGB BLD-MCNC: 15.1 G/DL — SIGNIFICANT CHANGE UP (ref 11.5–15.5)
MCHC RBC-ENTMCNC: 30.3 PG — SIGNIFICANT CHANGE UP (ref 27–34)
MCHC RBC-ENTMCNC: 32.5 GM/DL — SIGNIFICANT CHANGE UP (ref 32–36)
MCV RBC AUTO: 93.4 FL — SIGNIFICANT CHANGE UP (ref 80–100)
NRBC # BLD: 0 /100 WBCS — SIGNIFICANT CHANGE UP (ref 0–0)
PLATELET # BLD AUTO: 565 K/UL — HIGH (ref 150–400)
POTASSIUM SERPL-MCNC: 3.7 MMOL/L — SIGNIFICANT CHANGE UP (ref 3.5–5.3)
POTASSIUM SERPL-SCNC: 3.7 MMOL/L — SIGNIFICANT CHANGE UP (ref 3.5–5.3)
RBC # BLD: 4.98 M/UL — SIGNIFICANT CHANGE UP (ref 3.8–5.2)
RBC # FLD: 13.4 % — SIGNIFICANT CHANGE UP (ref 10.3–14.5)
SODIUM SERPL-SCNC: 139 MMOL/L — SIGNIFICANT CHANGE UP (ref 135–145)
WBC # BLD: 16.85 K/UL — HIGH (ref 3.8–10.5)
WBC # FLD AUTO: 16.85 K/UL — HIGH (ref 3.8–10.5)

## 2020-04-08 PROCEDURE — 71045 X-RAY EXAM CHEST 1 VIEW: CPT | Mod: 26

## 2020-04-08 RX ADMIN — Medication 100 MILLIGRAM(S): at 06:15

## 2020-04-08 RX ADMIN — Medication 30 MILLIGRAM(S): at 18:11

## 2020-04-08 RX ADMIN — Medication 30 MILLIGRAM(S): at 06:14

## 2020-04-08 RX ADMIN — DORZOLAMIDE HYDROCHLORIDE TIMOLOL MALEATE 1 DROP(S): 20; 5 SOLUTION/ DROPS OPHTHALMIC at 06:15

## 2020-04-08 RX ADMIN — Medication 100 MILLIGRAM(S): at 18:12

## 2020-04-08 RX ADMIN — DORZOLAMIDE HYDROCHLORIDE TIMOLOL MALEATE 1 DROP(S): 20; 5 SOLUTION/ DROPS OPHTHALMIC at 18:12

## 2020-04-08 RX ADMIN — ENOXAPARIN SODIUM 40 MILLIGRAM(S): 100 INJECTION SUBCUTANEOUS at 11:54

## 2020-04-08 RX ADMIN — Medication 20 MILLIGRAM(S): at 06:15

## 2020-04-08 RX ADMIN — Medication 300 MILLIGRAM(S): at 11:54

## 2020-04-08 RX ADMIN — LISINOPRIL 20 MILLIGRAM(S): 2.5 TABLET ORAL at 06:15

## 2020-04-08 NOTE — PROGRESS NOTE ADULT - PROBLEM SELECTOR PLAN 1
-CRP and ferritin trending down  -continue steroids and Kineret  -s/p plaquenil and azithromycin  -O2 sats are decreasing, to 89-90%. -CRP and ferritin trending down  -continue steroids and Kineret  -s/p plaquenil and azithromycin  -O2 sats are decreasing, to 89-90% this AM but improved to 92% in the PM.   -encourage proning or lateral decubitus positioning

## 2020-04-08 NOTE — PROGRESS NOTE ADULT - PROBLEM SELECTOR PLAN 3
will continue to follow elevation in WBC count  if Patient continue to have a worsening leukocytosis would consider start further abx for a possible superinfection  repeat CXR slightly worse which may be to a lag in imaging vs clinical picture

## 2020-04-08 NOTE — PROGRESS NOTE ADULT - PROBLEM SELECTOR PLAN 2
Currently on NRB 15 + NC 15   sats have been 88-90 encourage deep breathing  -patient currently full code Currently on NRB 15 + NC 15   sats have been 88-90 encourage deep breathing  -patient currently full code  -will repeat CXR to assess for pulmonary vascular congestion or pleural effusions, Currently on NRB 15 + NC 15   sats have been 88-90 encourage deep breathing  -patient currently full code  -repeat CXR shows slightly worsening airspace opacities, but already started on lasix IV. will continue to monitor.

## 2020-04-08 NOTE — PROGRESS NOTE ADULT - PROBLEM SELECTOR PLAN 4
will restart lasix 20 IVPB daily  -may get CXR if respiratory status continues to worsen, to assess for pulmonary vascular congestion will restart lasix 20 IVPB daily  -f/u repeat CXR

## 2020-04-08 NOTE — PROGRESS NOTE ADULT - PROBLEM SELECTOR PLAN 2
Currently on NRB 15 + NC 15   sats have been 88-90 encourage deep breathing  -patient currently full code  -repeat CXR shows slightly worsening airspace opacities, but already started on lasix IV. will continue to monitor.

## 2020-04-08 NOTE — PROGRESS NOTE ADULT - PROBLEM SELECTOR PLAN 1
-CRP and ferritin trending down  -continue steroids and Kineret  -s/p plaquenil and azithromycin  -encourage proning or lateral decubitus positioning

## 2020-04-08 NOTE — PROGRESS NOTE ADULT - SUBJECTIVE AND OBJECTIVE BOX
68 yo F with PMhx of HTN and prediabetes presents to the ED with complaints of fever, chills, dry cough and worsening SOB. Patient reports that she has been feeling febrile for the last 2 weeks. She has intermittent chills and dry cough. She feels chest pressure when she coughs. Her SOB has been worsening. She becomes short of breath when she walks to the bathroom from the bed. She also reports to have orthopnea and LE edema. She has been sleeping in the chair for the last couple of days. She denies any recent travel. She went to [place] before her symptoms and thinks that she was exposed there. She reports to have HTN and prediabetes. She had C-sectionsx2 in the past. No family hx of heart or lung disease. Lives with her . Able to take care of ADLs. No hx of smoking or chronic alcohol consumptions. Allergic to neuomycin-develops rash and swelling. Patient with increasing shortness of breath. Patient continue to need 100 % NRB. continue to follow O2Sat. Patient unchanged today. WBC count slightly elevated.68 yo F with PMhx of HTN and prediabetes presents to the ED with complaints of fever, chills, dry cough and worsening SOB. Patient reports that she has been feeling febrile for the last 2 weeks. She has intermittent chills and dry cough. She feels chest pressure when she coughs. Her SOB has been worsening. She becomes short of breath when she walks to the bathroom from the bed. She also reports to have orthopnea and LE edema. She has been sleeping in the chair for the last couple of days. She denies any recent travel. She went to [place] before her symptoms and thinks that she was exposed there. She reports to have HTN and prediabetes. She had C-sectionsx2 in the past. No family hx of heart or lung disease. Lives with her . Able to take care of ADLs. No hx of smoking or chronic alcohol consumptions. Allergic to neuomycin-develops rash and swelling. Patient with increasing shortness of breath. slight improvement in O2 sats after prone positioning. Patient states she is feeling a little better    MEDICATIONS  (STANDING):  allopurinol 300 milliGRAM(s) Oral daily  anakinra Injectable 100 milliGRAM(s) SubCutaneous two times a day  dorzolamide 2%/timolol 0.5% Ophthalmic Solution 1 Drop(s) Both EYES two times a day  enoxaparin Injectable 40 milliGRAM(s) SubCutaneous daily  furosemide   Injectable 20 milliGRAM(s) IV Push daily  latanoprost 0.005% Ophthalmic Solution 1 Drop(s) Both EYES at bedtime  lisinopril 20 milliGRAM(s) Oral daily  methylPREDNISolone sodium succinate Injectable 30 milliGRAM(s) IV Push two times a day    MEDICATIONS  (PRN):  acetaminophen   Tablet .. 650 milliGRAM(s) Oral every 6 hours PRN Temp greater or equal to 38C (100.4F)  benzocaine 15 mG/menthol 3.6 mG (Sugar-Free) Lozenge 1 Lozenge Oral four times a day PRN Cough  benzonatate 100 milliGRAM(s) Oral three times a day PRN Cough  guaiFENesin   Syrup  (Sugar-Free) 200 milliGRAM(s) Oral four times a day PRN Cough          VITALS:   T(C): 36.8 (04-08-20 @ 13:58), Max: 36.8 (04-07-20 @ 20:24)  HR: 80 (04-08-20 @ 13:58) (80 - 97)  BP: 130/86 (04-08-20 @ 13:58) (130/86 - 157/91)  RR: 18 (04-08-20 @ 13:58) (18 - 19)  SpO2: 92% (04-08-20 @ 13:58) (89% - 94%)  Wt(kg): --    PHYSICAL EXAM:  GENERAL: NAD, well nourished and conversant  HEAD:  Atraumatic  EYES: EOM, PERRLA, conjunctiva pink and sclera white  ENT: No tonsillar erythema, exudates, or enlargement, moist mucous membranes, good dentition, no lesions  NECK: Supple, No JVD, normal thyroid, carotids with normal upstrokes and no bruits  CHEST/LUNG: decreased BS with soft rhonchi  HEART: Regular rate and rhythm, No murmurs, rubs, or gallops  ABDOMEN: Soft, nondistended, no masses, guarding, tenderness or rebound, bowel sounds present  EXTREMITIES:  2+ Peripheral Pulses, No clubbing, cyanosis, or edema.   LYMPH: No lymphadenopathy noted  SKIN: No rashes or lesions  NERVOUS SYSTEM:  Alert & Oriented X3, normal cognitive function. Motor Strength 5/5 right upper and right lower.  5/5 left upper and left lower extremities, DTRs 2+ intact and symmetric      LABS:        CBC Full  -  ( 08 Apr 2020 08:47 )  WBC Count : 16.85 K/uL  RBC Count : 4.98 M/uL  Hemoglobin : 15.1 g/dL  Hematocrit : 46.5 %  Platelet Count - Automated : 565 K/uL  Mean Cell Volume : 93.4 fl  Mean Cell Hemoglobin : 30.3 pg  Mean Cell Hemoglobin Concentration : 32.5 gm/dL  Auto Neutrophil # : x  Auto Lymphocyte # : x  Auto Monocyte # : x  Auto Eosinophil # : x  Auto Basophil # : x  Auto Neutrophil % : x  Auto Lymphocyte % : x  Auto Monocyte % : x  Auto Eosinophil % : x  Auto Basophil % : x    04-08    139  |  91<L>  |  36<H>  ----------------------------<  155<H>  3.7   |  32<H>  |  0.72    Ca    9.7      08 Apr 2020 08:47  Phos  3.4     04-07  Mg     2.5     04-07    TPro  7.3  /  Alb  3.3  /  TBili  0.6  /  DBili  x   /  AST  65<H>  /  ALT  194<H>  /  AlkPhos  156<H>  04-07    LIVER FUNCTIONS - ( 07 Apr 2020 06:12 )  Alb: 3.3 g/dL / Pro: 7.3 g/dL / ALK PHOS: 156 U/L / ALT: 194 U/L / AST: 65 U/L / GGT: x               CAPILLARY BLOOD GLUCOSE          RADIOLOGY & ADDITIONAL TESTS:

## 2020-04-08 NOTE — PROGRESS NOTE ADULT - SUBJECTIVE AND OBJECTIVE BOX
***************************************************************  Nell Roth, PGY1  Internal Medicine   pager: [phone] [facility]: [phone]  ***************************************************************    PROGRESS NOTE:     Patient is a 69y old  Female who presents with a chief complaint of 69F p/w sob and fever (07 Apr 2020 15:00)      SUBJECTIVE / OVERNIGHT EVENTS:  No acute events overnight. This AM, patient reports shortness of breath and cough, unchanged from before. Patient denies abdominal pain and chest pain.     ADDITIONAL REVIEW OF SYSTEMS: 10 point ROS negative except per HPI    MEDICATIONS  (STANDING):  allopurinol 300 milliGRAM(s) Oral daily  anakinra Injectable 100 milliGRAM(s) SubCutaneous two times a day  dorzolamide 2%/timolol 0.5% Ophthalmic Solution 1 Drop(s) Both EYES two times a day  enoxaparin Injectable 40 milliGRAM(s) SubCutaneous daily  furosemide   Injectable 20 milliGRAM(s) IV Push daily  latanoprost 0.005% Ophthalmic Solution 1 Drop(s) Both EYES at bedtime  lisinopril 20 milliGRAM(s) Oral daily  methylPREDNISolone sodium succinate Injectable 30 milliGRAM(s) IV Push two times a day    MEDICATIONS  (PRN):  acetaminophen   Tablet .. 650 milliGRAM(s) Oral every 6 hours PRN Temp greater or equal to 38C (100.4F)  benzocaine 15 mG/menthol 3.6 mG (Sugar-Free) Lozenge 1 Lozenge Oral four times a day PRN Cough  benzonatate 100 milliGRAM(s) Oral three times a day PRN Cough  guaiFENesin   Syrup  (Sugar-Free) 200 milliGRAM(s) Oral four times a day PRN Cough      CAPILLARY BLOOD GLUCOSE        I&O's Summary    07 Apr 2020 07:01  -  08 Apr 2020 07:00  --------------------------------------------------------  IN: 0 mL / OUT: 100 mL / NET: -100 mL        PHYSICAL EXAM:  Vital Signs Last 24 Hrs  T(C): 36.6 (08 Apr 2020 06:20), Max: 36.8 (07 Apr 2020 20:24)  T(F): 97.8 (08 Apr 2020 06:20), Max: 98.3 (07 Apr 2020 20:24)  HR: 81 (08 Apr 2020 06:20) (81 - 101)  BP: 152/90 (08 Apr 2020 06:20) (131/82 - 157/91)  BP(mean): --  RR: 19 (08 Apr 2020 06:20) (18 - 20)  SpO2: 89% (08 Apr 2020 06:20) (89% - 94%)    CONSTITUTIONAL: NAD, well-developed  RESPIRATORY: Normal respiratory effort; lungs are clear to auscultation bilaterally  CARDIOVASCULAR: Regular rate and rhythm, normal S1 and S2, no murmur/rub/gallop; No lower extremity edema;  PSYCH: A+O to person, place, and time; affect appropriate    LABS:                        15.1   16.85 )-----------( 565      ( 08 Apr 2020 08:47 )             46.5     04-08    139  |  91<L>  |  36<H>  ----------------------------<  155<H>  3.7   |  32<H>  |  0.72    Ca    9.7      08 Apr 2020 08:47  Phos  3.4     04-07  Mg     2.5     04-07    TPro  7.3  /  Alb  3.3  /  TBili  0.6  /  DBili  x   /  AST  65<H>  /  ALT  194<H>  /  AlkPhos  156<H>  04-07    RADIOLOGY & ADDITIONAL TESTS:  Results Reviewed:   Imaging Personally Reviewed:  Electrocardiogram Personally Reviewed:    COORDINATION OF CARE:  Care Discussed with Consultants/Other Providers [Y/N]:  Prior or Outpatient Records Reviewed [Y/N]:

## 2020-04-09 LAB
ANION GAP SERPL CALC-SCNC: 18 MMOL/L — HIGH (ref 5–17)
BUN SERPL-MCNC: 45 MG/DL — HIGH (ref 7–23)
CALCIUM SERPL-MCNC: 9.9 MG/DL — SIGNIFICANT CHANGE UP (ref 8.4–10.5)
CHLORIDE SERPL-SCNC: 91 MMOL/L — LOW (ref 96–108)
CO2 SERPL-SCNC: 30 MMOL/L — SIGNIFICANT CHANGE UP (ref 22–31)
CREAT SERPL-MCNC: 0.7 MG/DL — SIGNIFICANT CHANGE UP (ref 0.5–1.3)
D DIMER BLD IA.RAPID-MCNC: 2730 NG/ML DDU — HIGH
FERRITIN SERPL-MCNC: 1432 NG/ML — HIGH (ref 15–150)
GLUCOSE SERPL-MCNC: 322 MG/DL — HIGH (ref 70–99)
HCT VFR BLD CALC: 48.1 % — HIGH (ref 34.5–45)
HGB BLD-MCNC: 15.8 G/DL — HIGH (ref 11.5–15.5)
MCHC RBC-ENTMCNC: 30.7 PG — SIGNIFICANT CHANGE UP (ref 27–34)
MCHC RBC-ENTMCNC: 32.8 GM/DL — SIGNIFICANT CHANGE UP (ref 32–36)
MCV RBC AUTO: 93.6 FL — SIGNIFICANT CHANGE UP (ref 80–100)
NRBC # BLD: 0 /100 WBCS — SIGNIFICANT CHANGE UP (ref 0–0)
PLATELET # BLD AUTO: 604 K/UL — HIGH (ref 150–400)
POTASSIUM SERPL-MCNC: 3.7 MMOL/L — SIGNIFICANT CHANGE UP (ref 3.5–5.3)
POTASSIUM SERPL-SCNC: 3.7 MMOL/L — SIGNIFICANT CHANGE UP (ref 3.5–5.3)
RBC # BLD: 5.14 M/UL — SIGNIFICANT CHANGE UP (ref 3.8–5.2)
RBC # FLD: 13.2 % — SIGNIFICANT CHANGE UP (ref 10.3–14.5)
SODIUM SERPL-SCNC: 139 MMOL/L — SIGNIFICANT CHANGE UP (ref 135–145)
WBC # BLD: 17.84 K/UL — HIGH (ref 3.8–10.5)
WBC # FLD AUTO: 17.84 K/UL — HIGH (ref 3.8–10.5)

## 2020-04-09 RX ORDER — SODIUM CHLORIDE 9 MG/ML
1000 INJECTION INTRAMUSCULAR; INTRAVENOUS; SUBCUTANEOUS ONCE
Refills: 0 | Status: COMPLETED | OUTPATIENT
Start: 2020-04-09 | End: 2020-04-09

## 2020-04-09 RX ORDER — PIPERACILLIN AND TAZOBACTAM 4; .5 G/20ML; G/20ML
3.38 INJECTION, POWDER, LYOPHILIZED, FOR SOLUTION INTRAVENOUS EVERY 8 HOURS
Refills: 0 | Status: COMPLETED | OUTPATIENT
Start: 2020-04-09 | End: 2020-04-16

## 2020-04-09 RX ORDER — SODIUM CHLORIDE 0.65 %
1 AEROSOL, SPRAY (ML) NASAL
Refills: 0 | Status: DISCONTINUED | OUTPATIENT
Start: 2020-04-09 | End: 2020-04-21

## 2020-04-09 RX ADMIN — LISINOPRIL 20 MILLIGRAM(S): 2.5 TABLET ORAL at 06:07

## 2020-04-09 RX ADMIN — Medication 30 MILLIGRAM(S): at 06:06

## 2020-04-09 RX ADMIN — Medication 100 MILLIGRAM(S): at 22:22

## 2020-04-09 RX ADMIN — PIPERACILLIN AND TAZOBACTAM 25 GRAM(S): 4; .5 INJECTION, POWDER, LYOPHILIZED, FOR SOLUTION INTRAVENOUS at 22:22

## 2020-04-09 RX ADMIN — Medication 200 MILLIGRAM(S): at 06:06

## 2020-04-09 RX ADMIN — DORZOLAMIDE HYDROCHLORIDE TIMOLOL MALEATE 1 DROP(S): 20; 5 SOLUTION/ DROPS OPHTHALMIC at 18:45

## 2020-04-09 RX ADMIN — DORZOLAMIDE HYDROCHLORIDE TIMOLOL MALEATE 1 DROP(S): 20; 5 SOLUTION/ DROPS OPHTHALMIC at 06:07

## 2020-04-09 RX ADMIN — PIPERACILLIN AND TAZOBACTAM 25 GRAM(S): 4; .5 INJECTION, POWDER, LYOPHILIZED, FOR SOLUTION INTRAVENOUS at 16:59

## 2020-04-09 RX ADMIN — ENOXAPARIN SODIUM 40 MILLIGRAM(S): 100 INJECTION SUBCUTANEOUS at 11:59

## 2020-04-09 RX ADMIN — LATANOPROST 1 DROP(S): 0.05 SOLUTION/ DROPS OPHTHALMIC; TOPICAL at 22:22

## 2020-04-09 RX ADMIN — Medication 100 MILLIGRAM(S): at 06:07

## 2020-04-09 RX ADMIN — SODIUM CHLORIDE 1000 MILLILITER(S): 9 INJECTION INTRAMUSCULAR; INTRAVENOUS; SUBCUTANEOUS at 13:01

## 2020-04-09 RX ADMIN — LATANOPROST 1 DROP(S): 0.05 SOLUTION/ DROPS OPHTHALMIC; TOPICAL at 00:06

## 2020-04-09 RX ADMIN — Medication 300 MILLIGRAM(S): at 11:59

## 2020-04-09 RX ADMIN — Medication 20 MILLIGRAM(S): at 06:07

## 2020-04-09 RX ADMIN — Medication 100 MILLIGRAM(S): at 06:06

## 2020-04-09 NOTE — PROGRESS NOTE ADULT - SUBJECTIVE AND OBJECTIVE BOX
70 yo F with PMhx of HTN and prediabetes presents to the ED with complaints of fever, chills, dry cough and worsening SOB. Patient reports that she has been feeling febrile for the last 2 weeks. She has intermittent chills and dry cough. She feels chest pressure when she coughs. Her SOB has been worsening. She becomes short of breath when she walks to the bathroom from the bed. She also reports to have orthopnea and LE edema. She has been sleeping in the chair for the last couple of days. She denies any recent travel. She went to [place] before her symptoms and thinks that she was exposed there. She reports to have HTN and prediabetes. She had C-sectionsx2 in the past. No family hx of heart or lung disease. Lives with her . Able to take care of ADLs. No hx of smoking or chronic alcohol consumptions. Allergic to neuomycin-develops rash and swelling. Patient with increasing shortness of breath. Patient continue to need 100 % NRB. continue to follow O2Sat. Patient unchanged today. WBC count slightly elevated.70 yo F with PMhx of HTN and prediabetes presents to the ED with complaints of fever, chills, dry cough and worsening SOB. Patient reports that she has been feeling febrile for the last 2 weeks. She has intermittent chills and dry cough. She feels chest pressure when she coughs. Her SOB has been worsening. She becomes short of breath when she walks to the bathroom from the bed. She also reports to have orthopnea and LE edema. She has been sleeping in the chair for the last couple of days. She denies any recent travel. She went to [place] before her symptoms and thinks that she was exposed there. She reports to have HTN and prediabetes. She had C-sectionsx2 in the past. No family hx of heart or lung disease. Lives with her . Able to take care of ADLs. No hx of smoking or chronic alcohol consumptions. Allergic to neuomycin-develops rash and swelling. Patient with increasing shortness of breath. slight improvement in O2 sats after prone positioning. Patient seen eating. continue to become hypoxic without O2    MEDICATIONS  (STANDING):  allopurinol 300 milliGRAM(s) Oral daily  dorzolamide 2%/timolol 0.5% Ophthalmic Solution 1 Drop(s) Both EYES two times a day  enoxaparin Injectable 40 milliGRAM(s) SubCutaneous daily  furosemide   Injectable 20 milliGRAM(s) IV Push daily  latanoprost 0.005% Ophthalmic Solution 1 Drop(s) Both EYES at bedtime  lisinopril 20 milliGRAM(s) Oral daily  piperacillin/tazobactam IVPB.. 3.375 Gram(s) IV Intermittent every 8 hours    MEDICATIONS  (PRN):  acetaminophen   Tablet .. 650 milliGRAM(s) Oral every 6 hours PRN Temp greater or equal to 38C (100.4F)  benzocaine 15 mG/menthol 3.6 mG (Sugar-Free) Lozenge 1 Lozenge Oral four times a day PRN Cough  benzonatate 100 milliGRAM(s) Oral three times a day PRN Cough  guaiFENesin   Syrup  (Sugar-Free) 200 milliGRAM(s) Oral four times a day PRN Cough  sodium chloride 0.65% Nasal 1 Spray(s) Both Nostrils four times a day PRN Nasal Congestion          VITALS:   T(C): 36.9 (04-09-20 @ 13:20), Max: 36.9 (04-09-20 @ 13:20)  HR: 108 (04-09-20 @ 13:20) (89 - 108)  BP: 142/82 (04-09-20 @ 13:20) (142/82 - 152/81)  RR: 18 (04-09-20 @ 13:20) (18 - 18)  SpO2: 93% (04-09-20 @ 16:49) (90% - 93%)  Wt(kg): --        LABS:        CBC Full  -  ( 09 Apr 2020 10:13 )  WBC Count : 17.84 K/uL  RBC Count : 5.14 M/uL  Hemoglobin : 15.8 g/dL  Hematocrit : 48.1 %  Platelet Count - Automated : 604 K/uL  Mean Cell Volume : 93.6 fl  Mean Cell Hemoglobin : 30.7 pg  Mean Cell Hemoglobin Concentration : 32.8 gm/dL  Auto Neutrophil # : x  Auto Lymphocyte # : x  Auto Monocyte # : x  Auto Eosinophil # : x  Auto Basophil # : x  Auto Neutrophil % : x  Auto Lymphocyte % : x  Auto Monocyte % : x  Auto Eosinophil % : x  Auto Basophil % : x    04-09    139  |  91<L>  |  45<H>  ----------------------------<  322<H>  3.7   |  30  |  0.70    Ca    9.9      09 Apr 2020 10:13            CAPILLARY BLOOD GLUCOSE          RADIOLOGY & ADDITIONAL TESTS:

## 2020-04-09 NOTE — PROGRESS NOTE ADULT - PROBLEM SELECTOR PLAN 1
-CRP and ferritin trending down  -continue steroids and Kineret  -continue supportive care  -s/p plaquenil and azithromycin  -O2 sats are decreasing, to 89-90% this AM but improved to 92% in the PM.   -encourage proning or lateral decubitus positioning

## 2020-04-10 LAB
ALBUMIN SERPL ELPH-MCNC: 2.7 G/DL — LOW (ref 3.3–5)
ALP SERPL-CCNC: 127 U/L — HIGH (ref 40–120)
ALT FLD-CCNC: 93 U/L — HIGH (ref 10–45)
ANION GAP SERPL CALC-SCNC: 17 MMOL/L — SIGNIFICANT CHANGE UP (ref 5–17)
AST SERPL-CCNC: 39 U/L — SIGNIFICANT CHANGE UP (ref 10–40)
BILIRUB SERPL-MCNC: 0.6 MG/DL — SIGNIFICANT CHANGE UP (ref 0.2–1.2)
BUN SERPL-MCNC: 34 MG/DL — HIGH (ref 7–23)
CALCIUM SERPL-MCNC: 9.2 MG/DL — SIGNIFICANT CHANGE UP (ref 8.4–10.5)
CHLORIDE SERPL-SCNC: 96 MMOL/L — SIGNIFICANT CHANGE UP (ref 96–108)
CO2 SERPL-SCNC: 28 MMOL/L — SIGNIFICANT CHANGE UP (ref 22–31)
CREAT SERPL-MCNC: 0.71 MG/DL — SIGNIFICANT CHANGE UP (ref 0.5–1.3)
GLUCOSE SERPL-MCNC: 206 MG/DL — HIGH (ref 70–99)
HCT VFR BLD CALC: 43.7 % — SIGNIFICANT CHANGE UP (ref 34.5–45)
HGB BLD-MCNC: 13.9 G/DL — SIGNIFICANT CHANGE UP (ref 11.5–15.5)
MCHC RBC-ENTMCNC: 30 PG — SIGNIFICANT CHANGE UP (ref 27–34)
MCHC RBC-ENTMCNC: 31.8 GM/DL — LOW (ref 32–36)
MCV RBC AUTO: 94.2 FL — SIGNIFICANT CHANGE UP (ref 80–100)
NRBC # BLD: 0 /100 WBCS — SIGNIFICANT CHANGE UP (ref 0–0)
PLATELET # BLD AUTO: 470 K/UL — HIGH (ref 150–400)
POTASSIUM SERPL-MCNC: 3.2 MMOL/L — LOW (ref 3.5–5.3)
POTASSIUM SERPL-SCNC: 3.2 MMOL/L — LOW (ref 3.5–5.3)
PROT SERPL-MCNC: 6.3 G/DL — SIGNIFICANT CHANGE UP (ref 6–8.3)
RBC # BLD: 4.64 M/UL — SIGNIFICANT CHANGE UP (ref 3.8–5.2)
RBC # FLD: 13.4 % — SIGNIFICANT CHANGE UP (ref 10.3–14.5)
SODIUM SERPL-SCNC: 141 MMOL/L — SIGNIFICANT CHANGE UP (ref 135–145)
WBC # BLD: 14.76 K/UL — HIGH (ref 3.8–10.5)
WBC # FLD AUTO: 14.76 K/UL — HIGH (ref 3.8–10.5)

## 2020-04-10 RX ORDER — ENOXAPARIN SODIUM 100 MG/ML
60 INJECTION SUBCUTANEOUS EVERY 12 HOURS
Refills: 0 | Status: DISCONTINUED | OUTPATIENT
Start: 2020-04-10 | End: 2020-04-23

## 2020-04-10 RX ADMIN — PIPERACILLIN AND TAZOBACTAM 25 GRAM(S): 4; .5 INJECTION, POWDER, LYOPHILIZED, FOR SOLUTION INTRAVENOUS at 05:30

## 2020-04-10 RX ADMIN — DORZOLAMIDE HYDROCHLORIDE TIMOLOL MALEATE 1 DROP(S): 20; 5 SOLUTION/ DROPS OPHTHALMIC at 17:22

## 2020-04-10 RX ADMIN — Medication 20 MILLIGRAM(S): at 05:30

## 2020-04-10 RX ADMIN — ENOXAPARIN SODIUM 60 MILLIGRAM(S): 100 INJECTION SUBCUTANEOUS at 11:02

## 2020-04-10 RX ADMIN — ENOXAPARIN SODIUM 60 MILLIGRAM(S): 100 INJECTION SUBCUTANEOUS at 22:54

## 2020-04-10 RX ADMIN — LATANOPROST 1 DROP(S): 0.05 SOLUTION/ DROPS OPHTHALMIC; TOPICAL at 22:55

## 2020-04-10 RX ADMIN — PIPERACILLIN AND TAZOBACTAM 25 GRAM(S): 4; .5 INJECTION, POWDER, LYOPHILIZED, FOR SOLUTION INTRAVENOUS at 13:14

## 2020-04-10 RX ADMIN — Medication 650 MILLIGRAM(S): at 20:48

## 2020-04-10 RX ADMIN — LISINOPRIL 20 MILLIGRAM(S): 2.5 TABLET ORAL at 05:30

## 2020-04-10 RX ADMIN — DORZOLAMIDE HYDROCHLORIDE TIMOLOL MALEATE 1 DROP(S): 20; 5 SOLUTION/ DROPS OPHTHALMIC at 05:31

## 2020-04-10 RX ADMIN — Medication 300 MILLIGRAM(S): at 11:02

## 2020-04-10 RX ADMIN — PIPERACILLIN AND TAZOBACTAM 25 GRAM(S): 4; .5 INJECTION, POWDER, LYOPHILIZED, FOR SOLUTION INTRAVENOUS at 22:55

## 2020-04-10 NOTE — PROGRESS NOTE ADULT - PROBLEM SELECTOR PLAN 2
Currently on NRB 15 + NC 15   sats have been 90-92% encourage deep breathing  -patient currently full code  -repeat CXR shows slightly worsening airspace opacities, but already started on lasix IV. will continue to monitor. Patient also started on Zosyn x 7 days for PNA

## 2020-04-10 NOTE — PROGRESS NOTE ADULT - SUBJECTIVE AND OBJECTIVE BOX
Authored by :  Jolynn Madsen MD   PGY-3 Dermatology   [phone]    68 yo F with PMhx of HTN and prediabetes presents to the ED with complaints of fever, chills, dry cough and worsening SOB.     Overnight- No acute events overnight     Subjective- Patient unable to tolerate weaning of O2. Was briefly on 10L but went back up to 15L overnight.     MEDICATIONS  (STANDING):  allopurinol 300 milliGRAM(s) Oral daily  dorzolamide 2%/timolol 0.5% Ophthalmic Solution 1 Drop(s) Both EYES two times a day  enoxaparin Injectable 40 milliGRAM(s) SubCutaneous daily  furosemide   Injectable 20 milliGRAM(s) IV Push daily  latanoprost 0.005% Ophthalmic Solution 1 Drop(s) Both EYES at bedtime  lisinopril 20 milliGRAM(s) Oral daily  piperacillin/tazobactam IVPB.. 3.375 Gram(s) IV Intermittent every 8 hours    MEDICATIONS  (PRN):  acetaminophen   Tablet .. 650 milliGRAM(s) Oral every 6 hours PRN Temp greater or equal to 38C (100.4F)  benzocaine 15 mG/menthol 3.6 mG (Sugar-Free) Lozenge 1 Lozenge Oral four times a day PRN Cough  benzonatate 100 milliGRAM(s) Oral three times a day PRN Cough  guaiFENesin   Syrup  (Sugar-Free) 200 milliGRAM(s) Oral four times a day PRN Cough  sodium chloride 0.65% Nasal 1 Spray(s) Both Nostrils four times a day PRN Nasal Congestion    VITALS:   T(C): 36.9 (04-09-20 @ 13:20), Max: 36.9 (04-09-20 @ 13:20)  HR: 108 (04-09-20 @ 13:20) (89 - 108)  BP: 142/82 (04-09-20 @ 13:20) (142/82 - 152/81)  RR: 18 (04-09-20 @ 13:20) (18 - 18)  SpO2: 93% (04-09-20 @ 16:49) (90% - 93%)  Wt(kg): --        LABS:        CBC Full  -  ( 09 Apr 2020 10:13 )  WBC Count : 17.84 K/uL  RBC Count : 5.14 M/uL  Hemoglobin : 15.8 g/dL  Hematocrit : 48.1 %  Platelet Count - Automated : 604 K/uL  Mean Cell Volume : 93.6 fl  Mean Cell Hemoglobin : 30.7 pg  Mean Cell Hemoglobin Concentration : 32.8 gm/dL  Auto Neutrophil # : x  Auto Lymphocyte # : x  Auto Monocyte # : x  Auto Eosinophil # : x  Auto Basophil # : x  Auto Neutrophil % : x  Auto Lymphocyte % : x  Auto Monocyte % : x  Auto Eosinophil % : x  Auto Basophil % : x    04-09    139  |  91<L>  |  45<H>  ----------------------------<  322<H>  3.7   |  30  |  0.70    Ca    9.9      09 Apr 2020 10:13            CAPILLARY BLOOD GLUCOSE          RADIOLOGY & ADDITIONAL TESTS:

## 2020-04-11 LAB
ALBUMIN SERPL ELPH-MCNC: 3 G/DL — LOW (ref 3.3–5)
ALP SERPL-CCNC: 140 U/L — HIGH (ref 40–120)
ALT FLD-CCNC: 86 U/L — HIGH (ref 10–45)
ANION GAP SERPL CALC-SCNC: 18 MMOL/L — HIGH (ref 5–17)
AST SERPL-CCNC: 45 U/L — HIGH (ref 10–40)
BASOPHILS # BLD AUTO: 0.02 K/UL — SIGNIFICANT CHANGE UP (ref 0–0.2)
BASOPHILS NFR BLD AUTO: 0.1 % — SIGNIFICANT CHANGE UP (ref 0–2)
BILIRUB SERPL-MCNC: 1.1 MG/DL — SIGNIFICANT CHANGE UP (ref 0.2–1.2)
BUN SERPL-MCNC: 33 MG/DL — HIGH (ref 7–23)
CALCIUM SERPL-MCNC: 9.5 MG/DL — SIGNIFICANT CHANGE UP (ref 8.4–10.5)
CHLORIDE SERPL-SCNC: 92 MMOL/L — LOW (ref 96–108)
CO2 SERPL-SCNC: 31 MMOL/L — SIGNIFICANT CHANGE UP (ref 22–31)
CREAT SERPL-MCNC: 0.78 MG/DL — SIGNIFICANT CHANGE UP (ref 0.5–1.3)
EOSINOPHIL # BLD AUTO: 0.1 K/UL — SIGNIFICANT CHANGE UP (ref 0–0.5)
EOSINOPHIL NFR BLD AUTO: 0.7 % — SIGNIFICANT CHANGE UP (ref 0–6)
GLUCOSE SERPL-MCNC: 153 MG/DL — HIGH (ref 70–99)
HCT VFR BLD CALC: 44.2 % — SIGNIFICANT CHANGE UP (ref 34.5–45)
HGB BLD-MCNC: 14.9 G/DL — SIGNIFICANT CHANGE UP (ref 11.5–15.5)
IMM GRANULOCYTES NFR BLD AUTO: 0.7 % — SIGNIFICANT CHANGE UP (ref 0–1.5)
LYMPHOCYTES # BLD AUTO: 0.8 K/UL — LOW (ref 1–3.3)
LYMPHOCYTES # BLD AUTO: 5.3 % — LOW (ref 13–44)
MCHC RBC-ENTMCNC: 31.4 PG — SIGNIFICANT CHANGE UP (ref 27–34)
MCHC RBC-ENTMCNC: 33.7 GM/DL — SIGNIFICANT CHANGE UP (ref 32–36)
MCV RBC AUTO: 93.1 FL — SIGNIFICANT CHANGE UP (ref 80–100)
MONOCYTES # BLD AUTO: 0.34 K/UL — SIGNIFICANT CHANGE UP (ref 0–0.9)
MONOCYTES NFR BLD AUTO: 2.2 % — SIGNIFICANT CHANGE UP (ref 2–14)
NEUTROPHILS # BLD AUTO: 13.77 K/UL — HIGH (ref 1.8–7.4)
NEUTROPHILS NFR BLD AUTO: 91 % — HIGH (ref 43–77)
NRBC # BLD: 0 /100 WBCS — SIGNIFICANT CHANGE UP (ref 0–0)
NT-PROBNP SERPL-SCNC: 184 PG/ML — SIGNIFICANT CHANGE UP (ref 0–300)
PLATELET # BLD AUTO: 436 K/UL — HIGH (ref 150–400)
POTASSIUM SERPL-MCNC: 3.1 MMOL/L — LOW (ref 3.5–5.3)
POTASSIUM SERPL-SCNC: 3.1 MMOL/L — LOW (ref 3.5–5.3)
PROCALCITONIN SERPL-MCNC: 0.52 NG/ML — HIGH (ref 0.02–0.1)
PROT SERPL-MCNC: 6.8 G/DL — SIGNIFICANT CHANGE UP (ref 6–8.3)
RBC # BLD: 4.75 M/UL — SIGNIFICANT CHANGE UP (ref 3.8–5.2)
RBC # FLD: 13.2 % — SIGNIFICANT CHANGE UP (ref 10.3–14.5)
SODIUM SERPL-SCNC: 141 MMOL/L — SIGNIFICANT CHANGE UP (ref 135–145)
WBC # BLD: 15.14 K/UL — HIGH (ref 3.8–10.5)
WBC # FLD AUTO: 15.14 K/UL — HIGH (ref 3.8–10.5)

## 2020-04-11 PROCEDURE — 71045 X-RAY EXAM CHEST 1 VIEW: CPT | Mod: 26

## 2020-04-11 RX ORDER — FUROSEMIDE 40 MG
20 TABLET ORAL ONCE
Refills: 0 | Status: COMPLETED | OUTPATIENT
Start: 2020-04-11 | End: 2020-04-11

## 2020-04-11 RX ORDER — POTASSIUM CHLORIDE 20 MEQ
20 PACKET (EA) ORAL ONCE
Refills: 0 | Status: COMPLETED | OUTPATIENT
Start: 2020-04-11 | End: 2020-04-11

## 2020-04-11 RX ORDER — POTASSIUM CHLORIDE 20 MEQ
40 PACKET (EA) ORAL ONCE
Refills: 0 | Status: COMPLETED | OUTPATIENT
Start: 2020-04-11 | End: 2020-04-11

## 2020-04-11 RX ADMIN — PIPERACILLIN AND TAZOBACTAM 25 GRAM(S): 4; .5 INJECTION, POWDER, LYOPHILIZED, FOR SOLUTION INTRAVENOUS at 22:42

## 2020-04-11 RX ADMIN — DORZOLAMIDE HYDROCHLORIDE TIMOLOL MALEATE 1 DROP(S): 20; 5 SOLUTION/ DROPS OPHTHALMIC at 05:41

## 2020-04-11 RX ADMIN — DORZOLAMIDE HYDROCHLORIDE TIMOLOL MALEATE 1 DROP(S): 20; 5 SOLUTION/ DROPS OPHTHALMIC at 18:09

## 2020-04-11 RX ADMIN — ENOXAPARIN SODIUM 60 MILLIGRAM(S): 100 INJECTION SUBCUTANEOUS at 11:35

## 2020-04-11 RX ADMIN — ENOXAPARIN SODIUM 60 MILLIGRAM(S): 100 INJECTION SUBCUTANEOUS at 22:39

## 2020-04-11 RX ADMIN — PIPERACILLIN AND TAZOBACTAM 25 GRAM(S): 4; .5 INJECTION, POWDER, LYOPHILIZED, FOR SOLUTION INTRAVENOUS at 14:14

## 2020-04-11 RX ADMIN — LATANOPROST 1 DROP(S): 0.05 SOLUTION/ DROPS OPHTHALMIC; TOPICAL at 22:41

## 2020-04-11 RX ADMIN — Medication 300 MILLIGRAM(S): at 11:36

## 2020-04-11 RX ADMIN — PIPERACILLIN AND TAZOBACTAM 25 GRAM(S): 4; .5 INJECTION, POWDER, LYOPHILIZED, FOR SOLUTION INTRAVENOUS at 05:42

## 2020-04-11 RX ADMIN — Medication 20 MILLIGRAM(S): at 13:24

## 2020-04-11 RX ADMIN — Medication 20 MILLIGRAM(S): at 05:42

## 2020-04-11 RX ADMIN — LISINOPRIL 20 MILLIGRAM(S): 2.5 TABLET ORAL at 05:42

## 2020-04-11 RX ADMIN — Medication 50 MILLIEQUIVALENT(S): at 22:43

## 2020-04-11 NOTE — PROGRESS NOTE ADULT - PROBLEM SELECTOR PLAN 1
-CRP and ferritin trending down  -continue steroids and Kineret  -continue supportive care  -s/p plaquenil and azithromycin  -O2 sats are stable in the low 90s  -encourage proning or lateral decubitus positioning -CRP and ferritin trending down  -continue steroids and Kineret  -continue supportive care  vigorous incentive spirometry  -s/p plaquenil and azithromycin  -O2 sats are stable in the low 90s  -encourage proning or lateral decubitus positioning

## 2020-04-11 NOTE — PROGRESS NOTE ADULT - SUBJECTIVE AND OBJECTIVE BOX
68 yo F with PMhx of HTN and prediabetes presents to the ED with complaints of fever, chills, dry cough and worsening SOB.     Overnight- No acute events overnight     Subjective- Patient unable to tolerate weaning of O2. Remains on 15NC/15NRB  unable to see improvement in O2 sat  Ronchi left chest posteriorly  Patine is laboring to breathe.  Questionable  fluid overload Check BNP and repeat CXR in am     MEDICATIONS  (STANDING):  allopurinol 300 milliGRAM(s) Oral daily  dorzolamide 2%/timolol 0.5% Ophthalmic Solution 1 Drop(s) Both EYES two times a day  enoxaparin Injectable 40 milliGRAM(s) SubCutaneous daily  furosemide   Injectable 20 milliGRAM(s) IV Push daily  latanoprost 0.005% Ophthalmic Solution 1 Drop(s) Both EYES at bedtime  lisinopril 20 milliGRAM(s) Oral daily  piperacillin/tazobactam IVPB.. 3.375 Gram(s) IV Intermittent every 8 hours    MEDICATIONS  (PRN):  acetaminophen   Tablet .. 650 milliGRAM(s) Oral every 6 hours PRN Temp greater or equal to 38C (100.4F)  benzocaine 15 mG/menthol 3.6 mG (Sugar-Free) Lozenge 1 Lozenge Oral four times a day PRN Cough  benzonatate 100 milliGRAM(s) Oral three times a day PRN Cough  guaiFENesin   Syrup  (Sugar-Free) 200 milliGRAM(s) Oral four times a day PRN Cough  sodium chloride 0.65% Nasal 1 Spray(s) Both Nostrils four times a day PRN Nasal Congestion    Vital Signs Last 24 Hrs  T(C): 36.4 (11 Apr 2020 05:38), Max: 38.1 (10 Apr 2020 20:02)  T(F): 97.6 (11 Apr 2020 05:38), Max: 100.6 (10 Apr 2020 20:02)  HR: 114 (11 Apr 2020 05:38) (114 - 121)  BP: 126/84 (11 Apr 2020 05:38) (118/64 - 135/84)  BP(mean): --  RR: 20 (11 Apr 2020 05:38) (20 - 20)  SpO2: 90% (11 Apr 2020 05:38) (90% - 91%)        LABS:          CBC Full  -  ( 10 Apr 2020 15:36 )  WBC Count : 14.76 K/uL  RBC Count : 4.64 M/uL  Hemoglobin : 13.9 g/dL  Hematocrit : 43.7 %  Platelet Count - Automated : 470 K/uL  Mean Cell Volume : 94.2 fl  Mean Cell Hemoglobin : 30.0 pg  Mean Cell Hemoglobin Concentration : 31.8 gm/dL  Auto Neutrophil # : x  Auto Lymphocyte # : x  Auto Monocyte # : x  Auto Eosinophil # : x  Auto Basophil # : x  Auto Neutrophil % : x  Auto Lymphocyte % : x  Auto Monocyte % : x  Auto Eosinophil % : x  Auto Basophil % : x    04-10    141  |  96  |  34<H>  ----------------------------<  206<H>  3.2<L>   |  28  |  0.71    Ca    9.2      10 Apr 2020 15:36    TPro  6.3  /  Alb  2.7<L>  /  TBili  0.6  /  DBili  x   /  AST  39  /  ALT  93<H>  /  AlkPhos  127<H>  04-10    LIVER FUNCTIONS - ( 10 Apr 2020 15:36 )  Alb: 2.7 g/dL / Pro: 6.3 g/dL / ALK PHOS: 127 U/L / ALT: 93 U/L / AST: 39 U/L / GGT: x                 CBC Full  -  ( 09 Apr 2020 10:13 )  WBC Count : 17.84 K/uL  RBC Count : 5.14 M/uL  Hemoglobin : 15.8 g/dL  Hematocrit : 48.1 %  Platelet Count - Automated : 604 K/uL  Mean Cell Volume : 93.6 fl  Mean Cell Hemoglobin : 30.7 pg  Mean Cell Hemoglobin Concentration : 32.8 gm/dL  Auto Neutrophil # : x  Auto Lymphocyte # : x  Auto Monocyte # : x  Auto Eosinophil # : x  Auto Basophil # : x  Auto Neutrophil % : x  Auto Lymphocyte % : x  Auto Monocyte % : x  Auto Eosinophil % : x  Auto Basophil % : x    04-09    139  |  91<L>  |  45<H>  ----------------------------<  322<H>  3.7   |  30  |  0.70    Ca    9.9      09 Apr 2020 10:13            CAPILLARY BLOOD GLUCOSE          RADIOLOGY & ADDITIONAL TESTS: 70 yo F with PMhx of HTN and prediabetes presents to the ED with complaints of fever, chills, dry cough and worsening SOB.     Overnight- No acute events overnight     Subjective- Patient unable to tolerate weaning of O2. Remains on 15NC/15NRB  unable to see improvement in O2 sat  Ronchi left chest posteriorly  Patine is laboring to breathe.  Questionable  fluid overload Check BNP and repeat CXR today to see if pneumonia is worsening  [facility] Pulmonary conuslt if CXR worse. If chf obtain echo to r/o worsening LV function.     MEDICATIONS  (STANDING):  allopurinol 300 milliGRAM(s) Oral daily  dorzolamide 2%/timolol 0.5% Ophthalmic Solution 1 Drop(s) Both EYES two times a day  enoxaparin Injectable 40 milliGRAM(s) SubCutaneous daily  furosemide   Injectable 20 milliGRAM(s) IV Push daily  latanoprost 0.005% Ophthalmic Solution 1 Drop(s) Both EYES at bedtime  lisinopril 20 milliGRAM(s) Oral daily  piperacillin/tazobactam IVPB.. 3.375 Gram(s) IV Intermittent every 8 hours    MEDICATIONS  (PRN):  acetaminophen   Tablet .. 650 milliGRAM(s) Oral every 6 hours PRN Temp greater or equal to 38C (100.4F)  benzocaine 15 mG/menthol 3.6 mG (Sugar-Free) Lozenge 1 Lozenge Oral four times a day PRN Cough  benzonatate 100 milliGRAM(s) Oral three times a day PRN Cough  guaiFENesin   Syrup  (Sugar-Free) 200 milliGRAM(s) Oral four times a day PRN Cough  sodium chloride 0.65% Nasal 1 Spray(s) Both Nostrils four times a day PRN Nasal Congestion    Vital Signs Last 24 Hrs  T(C): 36.4 (11 Apr 2020 05:38), Max: 38.1 (10 Apr 2020 20:02)  T(F): 97.6 (11 Apr 2020 05:38), Max: 100.6 (10 Apr 2020 20:02)  HR: 114 (11 Apr 2020 05:38) (114 - 121)  BP: 126/84 (11 Apr 2020 05:38) (118/64 - 135/84)  BP(mean): --  RR: 20 (11 Apr 2020 05:38) (20 - 20)  SpO2: 90% (11 Apr 2020 05:38) (90% - 91%)        LABS:            CBC Full  -  ( 10 Apr 2020 15:36 )  WBC Count : 14.76 K/uL  RBC Count : 4.64 M/uL  Hemoglobin : 13.9 g/dL  Hematocrit : 43.7 %  Platelet Count - Automated : 470 K/uL  Mean Cell Volume : 94.2 fl  Mean Cell Hemoglobin : 30.0 pg  Mean Cell Hemoglobin Concentration : 31.8 gm/dL  Auto Neutrophil # : x  Auto Lymphocyte # : x  Auto Monocyte # : x  Auto Eosinophil # : x  Auto Basophil # : x  Auto Neutrophil % : x  Auto Lymphocyte % : x  Auto Monocyte % : x  Auto Eosinophil % : x  Auto Basophil % : x    04-10    141  |  96  |  34<H>  ----------------------------<  206<H>  3.2<L>   |  28  |  0.71    Ca    9.2      10 Apr 2020 15:36    TPro  6.3  /  Alb  2.7<L>  /  TBili  0.6  /  DBili  x   /  AST  39  /  ALT  93<H>  /  AlkPhos  127<H>  04-10    LIVER FUNCTIONS - ( 10 Apr 2020 15:36 )  Alb: 2.7 g/dL / Pro: 6.3 g/dL / ALK PHOS: 127 U/L / ALT: 93 U/L / AST: 39 U/L / GGT: x                 CBC Full  -  ( 09 Apr 2020 10:13 )  WBC Count : 17.84 K/uL  RBC Count : 5.14 M/uL  Hemoglobin : 15.8 g/dL  Hematocrit : 48.1 %  Platelet Count - Automated : 604 K/uL  Mean Cell Volume : 93.6 fl  Mean Cell Hemoglobin : 30.7 pg  Mean Cell Hemoglobin Concentration : 32.8 gm/dL  Auto Neutrophil # : x  Auto Lymphocyte # : x  Auto Monocyte # : x  Auto Eosinophil # : x  Auto Basophil # : x  Auto Neutrophil % : x  Auto Lymphocyte % : x  Auto Monocyte % : x  Auto Eosinophil % : x  Auto Basophil % : x    04-09    139  |  91<L>  |  45<H>  ----------------------------<  322<H>  3.7   |  30  |  0.70    Ca    9.9      09 Apr 2020 10:13            CAPILLARY BLOOD GLUCOSE          RADIOLOGY & ADDITIONAL TESTS:

## 2020-04-11 NOTE — PROGRESS NOTE ADULT - PROBLEM SELECTOR PLAN 2
Currently on NRB 15 + NC 15   sats have been 90-92% encourage deep breathing  -patient currently full code  -repeat CXR shows slightly worsening airspace opacities, but already started on lasix IV. will continue to monitor. Patient also started on Zosyn x 7 days for PNA Currently on NRB 15 + NC 15   sats have been 90-92% encourage deep breathing  Desaturation  -patient currently full code  -repeat CXR shows slightly worsening airspace opacities, but already started on lasix IV. will continue to monitor. Patient also started on Zosyn x 7 days for PNA

## 2020-04-11 NOTE — RAPID RESPONSE TEAM SUMMARY - NSSITUATIONBACKGROUNDRRT_GEN_ALL_CORE
68 yo F with PMhx of HTN and prediabetes presents to the ED with complaints of fever, chills, dry cough and worsening SOB admit to medicine for acute hypoxic respiratory failure 2/2 COVID-19 pneumonia; RRT called for hypoxia; s/p steroids x 3days, getting lasix 20 IV daily, s/p anakinra/plaquinel, Vitamin C, full dose LVX. D-dimer increased to 2000s on 4/9 from 500s. Hypokalemic elevated procalcitonin. 70 yo F with PMhx of HTN and prediabetes presents to the ED with complaints of fever, chills, dry cough and worsening SOB admit to medicine for acute hypoxic respiratory failure 2/2 COVID-19 pneumonia; RRT called for hypoxia to low 80s; s/p steroids x 3days, getting lasix 20 IV daily + additional dose per primary team, s/p anakinra/plaquinel, Vitamin C, full dose LVX. D-dimer increased to 2000s on 4/9 from 500s. Hypokalemic elevated procalcitonin. Proned patient and achieved SpO2 of low 90s.

## 2020-04-11 NOTE — PROGRESS NOTE ADULT - PROBLEM SELECTOR PLAN 4
will restart lasix 20 IVPB daily  -f/u repeat CXR will restart lasix 20 IVPB daily  increasee to 40 mg po qd  -f/u repeat CXR

## 2020-04-11 NOTE — PROGRESS NOTE ADULT - PROBLEM SELECTOR PLAN 1
-CRP and ferritin trending down  -continue steroids and Kineret  -continue supportive care  -s/p plaquenil and azithromycin  -O2 sats are stable in the low 90s  -encourage proning or lateral decubitus positioning

## 2020-04-11 NOTE — PROGRESS NOTE ADULT - SUBJECTIVE AND OBJECTIVE BOX
Authored by :  Jolynn Madsen MD   PGY-3 Dermatology   [phone]    70 yo F with PMhx of HTN and prediabetes presents to the ED with complaints of fever, chills, dry cough and worsening SOB.     Overnight- No acute events overnight     Subjective- Patient unable to tolerate weaning of O2. Remains on 15NC/15NRB    MEDICATIONS  (STANDING):  allopurinol 300 milliGRAM(s) Oral daily  dorzolamide 2%/timolol 0.5% Ophthalmic Solution 1 Drop(s) Both EYES two times a day  enoxaparin Injectable 40 milliGRAM(s) SubCutaneous daily  furosemide   Injectable 20 milliGRAM(s) IV Push daily  latanoprost 0.005% Ophthalmic Solution 1 Drop(s) Both EYES at bedtime  lisinopril 20 milliGRAM(s) Oral daily  piperacillin/tazobactam IVPB.. 3.375 Gram(s) IV Intermittent every 8 hours    MEDICATIONS  (PRN):  acetaminophen   Tablet .. 650 milliGRAM(s) Oral every 6 hours PRN Temp greater or equal to 38C (100.4F)  benzocaine 15 mG/menthol 3.6 mG (Sugar-Free) Lozenge 1 Lozenge Oral four times a day PRN Cough  benzonatate 100 milliGRAM(s) Oral three times a day PRN Cough  guaiFENesin   Syrup  (Sugar-Free) 200 milliGRAM(s) Oral four times a day PRN Cough  sodium chloride 0.65% Nasal 1 Spray(s) Both Nostrils four times a day PRN Nasal Congestion    VITALS:   T(C): 36.9 (04-09-20 @ 13:20), Max: 36.9 (04-09-20 @ 13:20)  HR: 108 (04-09-20 @ 13:20) (89 - 108)  BP: 142/82 (04-09-20 @ 13:20) (142/82 - 152/81)  RR: 18 (04-09-20 @ 13:20) (18 - 18)  SpO2: 93% (04-09-20 @ 16:49) (90% - 93%)  Wt(kg): --        LABS:        CBC Full  -  ( 09 Apr 2020 10:13 )  WBC Count : 17.84 K/uL  RBC Count : 5.14 M/uL  Hemoglobin : 15.8 g/dL  Hematocrit : 48.1 %  Platelet Count - Automated : 604 K/uL  Mean Cell Volume : 93.6 fl  Mean Cell Hemoglobin : 30.7 pg  Mean Cell Hemoglobin Concentration : 32.8 gm/dL  Auto Neutrophil # : x  Auto Lymphocyte # : x  Auto Monocyte # : x  Auto Eosinophil # : x  Auto Basophil # : x  Auto Neutrophil % : x  Auto Lymphocyte % : x  Auto Monocyte % : x  Auto Eosinophil % : x  Auto Basophil % : x    04-09    139  |  91<L>  |  45<H>  ----------------------------<  322<H>  3.7   |  30  |  0.70    Ca    9.9      09 Apr 2020 10:13            CAPILLARY BLOOD GLUCOSE          RADIOLOGY & ADDITIONAL TESTS:

## 2020-04-12 LAB
ALBUMIN SERPL ELPH-MCNC: 2.9 G/DL — LOW (ref 3.3–5)
ALP SERPL-CCNC: 144 U/L — HIGH (ref 40–120)
ALT FLD-CCNC: 72 U/L — HIGH (ref 10–45)
ANION GAP SERPL CALC-SCNC: 18 MMOL/L — HIGH (ref 5–17)
AST SERPL-CCNC: 39 U/L — SIGNIFICANT CHANGE UP (ref 10–40)
BILIRUB SERPL-MCNC: 1 MG/DL — SIGNIFICANT CHANGE UP (ref 0.2–1.2)
BUN SERPL-MCNC: 44 MG/DL — HIGH (ref 7–23)
CALCIUM SERPL-MCNC: 10.2 MG/DL — SIGNIFICANT CHANGE UP (ref 8.4–10.5)
CHLORIDE SERPL-SCNC: 91 MMOL/L — LOW (ref 96–108)
CO2 SERPL-SCNC: 32 MMOL/L — HIGH (ref 22–31)
CREAT SERPL-MCNC: 0.77 MG/DL — SIGNIFICANT CHANGE UP (ref 0.5–1.3)
GLUCOSE SERPL-MCNC: 171 MG/DL — HIGH (ref 70–99)
HCT VFR BLD CALC: 48.9 % — HIGH (ref 34.5–45)
HGB BLD-MCNC: 15.4 G/DL — SIGNIFICANT CHANGE UP (ref 11.5–15.5)
MAGNESIUM SERPL-MCNC: 2.1 MG/DL — SIGNIFICANT CHANGE UP (ref 1.6–2.6)
MCHC RBC-ENTMCNC: 30.1 PG — SIGNIFICANT CHANGE UP (ref 27–34)
MCHC RBC-ENTMCNC: 31.5 GM/DL — LOW (ref 32–36)
MCV RBC AUTO: 95.5 FL — SIGNIFICANT CHANGE UP (ref 80–100)
NRBC # BLD: 0 /100 WBCS — SIGNIFICANT CHANGE UP (ref 0–0)
PLATELET # BLD AUTO: 392 K/UL — SIGNIFICANT CHANGE UP (ref 150–400)
POTASSIUM SERPL-MCNC: 3.5 MMOL/L — SIGNIFICANT CHANGE UP (ref 3.5–5.3)
POTASSIUM SERPL-SCNC: 3.5 MMOL/L — SIGNIFICANT CHANGE UP (ref 3.5–5.3)
PROT SERPL-MCNC: 7.2 G/DL — SIGNIFICANT CHANGE UP (ref 6–8.3)
RBC # BLD: 5.12 M/UL — SIGNIFICANT CHANGE UP (ref 3.8–5.2)
RBC # FLD: 13.3 % — SIGNIFICANT CHANGE UP (ref 10.3–14.5)
SODIUM SERPL-SCNC: 141 MMOL/L — SIGNIFICANT CHANGE UP (ref 135–145)
WBC # BLD: 13.44 K/UL — HIGH (ref 3.8–10.5)
WBC # FLD AUTO: 13.44 K/UL — HIGH (ref 3.8–10.5)

## 2020-04-12 RX ORDER — POTASSIUM CHLORIDE 20 MEQ
40 PACKET (EA) ORAL ONCE
Refills: 0 | Status: COMPLETED | OUTPATIENT
Start: 2020-04-12 | End: 2020-04-12

## 2020-04-12 RX ADMIN — LATANOPROST 1 DROP(S): 0.05 SOLUTION/ DROPS OPHTHALMIC; TOPICAL at 22:28

## 2020-04-12 RX ADMIN — DORZOLAMIDE HYDROCHLORIDE TIMOLOL MALEATE 1 DROP(S): 20; 5 SOLUTION/ DROPS OPHTHALMIC at 19:45

## 2020-04-12 RX ADMIN — ENOXAPARIN SODIUM 60 MILLIGRAM(S): 100 INJECTION SUBCUTANEOUS at 22:28

## 2020-04-12 RX ADMIN — LISINOPRIL 20 MILLIGRAM(S): 2.5 TABLET ORAL at 06:01

## 2020-04-12 RX ADMIN — Medication 20 MILLIGRAM(S): at 06:01

## 2020-04-12 RX ADMIN — ENOXAPARIN SODIUM 60 MILLIGRAM(S): 100 INJECTION SUBCUTANEOUS at 11:02

## 2020-04-12 RX ADMIN — Medication 300 MILLIGRAM(S): at 13:01

## 2020-04-12 RX ADMIN — PIPERACILLIN AND TAZOBACTAM 25 GRAM(S): 4; .5 INJECTION, POWDER, LYOPHILIZED, FOR SOLUTION INTRAVENOUS at 13:01

## 2020-04-12 RX ADMIN — PIPERACILLIN AND TAZOBACTAM 25 GRAM(S): 4; .5 INJECTION, POWDER, LYOPHILIZED, FOR SOLUTION INTRAVENOUS at 22:28

## 2020-04-12 RX ADMIN — PIPERACILLIN AND TAZOBACTAM 25 GRAM(S): 4; .5 INJECTION, POWDER, LYOPHILIZED, FOR SOLUTION INTRAVENOUS at 06:01

## 2020-04-12 RX ADMIN — Medication 40 MILLIEQUIVALENT(S): at 19:22

## 2020-04-12 RX ADMIN — DORZOLAMIDE HYDROCHLORIDE TIMOLOL MALEATE 1 DROP(S): 20; 5 SOLUTION/ DROPS OPHTHALMIC at 06:01

## 2020-04-12 NOTE — PROGRESS NOTE ADULT - PROBLEM SELECTOR PLAN 2
Currently on NRB 15 + NC 15   sats have been 90-92% encourage deep breathing  Desaturation  -patient currently full code  -repeat CXR shows slightly worsening airspace opacities, but already started on lasix IV. will continue to monitor. Patient also started on Zosyn x 7 days for PNA

## 2020-04-12 NOTE — PROGRESS NOTE ADULT - SUBJECTIVE AND OBJECTIVE BOX
Jolynn Madsen MD   PGY-3 Dermatology   [phone]    70 yo F with PMhx of HTN and prediabetes presents to the ED with complaints of fever, chills, dry cough and worsening SOB.     Overnight- Had a rapid response called yesterday evening due to saturations down to the low 80s. Patient was without distress. Patient was proned and recovered     Subjective- Patient unable to tolerate weaning of O2. Remains on 15NC/15NRB  unable to see improvement in O2 sat. No improvement despite additional Lasix dosing. BNP was not elevated. Repositioned pulse ox to be on forehead.      MEDICATIONS  (STANDING):  allopurinol 300 milliGRAM(s) Oral daily  dorzolamide 2%/timolol 0.5% Ophthalmic Solution 1 Drop(s) Both EYES two times a day  enoxaparin Injectable 40 milliGRAM(s) SubCutaneous daily  furosemide   Injectable 20 milliGRAM(s) IV Push daily  latanoprost 0.005% Ophthalmic Solution 1 Drop(s) Both EYES at bedtime  lisinopril 20 milliGRAM(s) Oral daily  piperacillin/tazobactam IVPB.. 3.375 Gram(s) IV Intermittent every 8 hours    MEDICATIONS  (PRN):  acetaminophen   Tablet .. 650 milliGRAM(s) Oral every 6 hours PRN Temp greater or equal to 38C (100.4F)  benzocaine 15 mG/menthol 3.6 mG (Sugar-Free) Lozenge 1 Lozenge Oral four times a day PRN Cough  benzonatate 100 milliGRAM(s) Oral three times a day PRN Cough  guaiFENesin   Syrup  (Sugar-Free) 200 milliGRAM(s) Oral four times a day PRN Cough  sodium chloride 0.65% Nasal 1 Spray(s) Both Nostrils four times a day PRN Nasal Congestion    Vital Signs Last 24 Hrs  T(C): 36.4 (11 Apr 2020 05:38), Max: 38.1 (10 Apr 2020 20:02)  T(F): 97.6 (11 Apr 2020 05:38), Max: 100.6 (10 Apr 2020 20:02)  HR: 114 (11 Apr 2020 05:38) (114 - 121)  BP: 126/84 (11 Apr 2020 05:38) (118/64 - 135/84)  BP(mean): --  RR: 20 (11 Apr 2020 05:38) (20 - 20)  SpO2: 90% (11 Apr 2020 05:38) (90% - 91%)        LABS:            CBC Full  -  ( 10 Apr 2020 15:36 )  WBC Count : 14.76 K/uL  RBC Count : 4.64 M/uL  Hemoglobin : 13.9 g/dL  Hematocrit : 43.7 %  Platelet Count - Automated : 470 K/uL  Mean Cell Volume : 94.2 fl  Mean Cell Hemoglobin : 30.0 pg  Mean Cell Hemoglobin Concentration : 31.8 gm/dL  Auto Neutrophil # : x  Auto Lymphocyte # : x  Auto Monocyte # : x  Auto Eosinophil # : x  Auto Basophil # : x  Auto Neutrophil % : x  Auto Lymphocyte % : x  Auto Monocyte % : x  Auto Eosinophil % : x  Auto Basophil % : x    04-10    141  |  96  |  34<H>  ----------------------------<  206<H>  3.2<L>   |  28  |  0.71    Ca    9.2      10 Apr 2020 15:36    TPro  6.3  /  Alb  2.7<L>  /  TBili  0.6  /  DBili  x   /  AST  39  /  ALT  93<H>  /  AlkPhos  127<H>  04-10    LIVER FUNCTIONS - ( 10 Apr 2020 15:36 )  Alb: 2.7 g/dL / Pro: 6.3 g/dL / ALK PHOS: 127 U/L / ALT: 93 U/L / AST: 39 U/L / GGT: x                 CBC Full  -  ( 09 Apr 2020 10:13 )  WBC Count : 17.84 K/uL  RBC Count : 5.14 M/uL  Hemoglobin : 15.8 g/dL  Hematocrit : 48.1 %  Platelet Count - Automated : 604 K/uL  Mean Cell Volume : 93.6 fl  Mean Cell Hemoglobin : 30.7 pg  Mean Cell Hemoglobin Concentration : 32.8 gm/dL  Auto Neutrophil # : x  Auto Lymphocyte # : x  Auto Monocyte # : x  Auto Eosinophil # : x  Auto Basophil # : x  Auto Neutrophil % : x  Auto Lymphocyte % : x  Auto Monocyte % : x  Auto Eosinophil % : x  Auto Basophil % : x    04-09    139  |  91<L>  |  45<H>  ----------------------------<  322<H>  3.7   |  30  |  0.70    Ca    9.9      09 Apr 2020 10:13            CAPILLARY BLOOD GLUCOSE          RADIOLOGY & ADDITIONAL TESTS:

## 2020-04-12 NOTE — PROGRESS NOTE ADULT - SUBJECTIVE AND OBJECTIVE BOX
68 yo F with PMhx of HTN and prediabetes presents to the ED with complaints of fever, chills, dry cough and worsening SOB.     Overnight- No acute events overnight Needed to be in prone position to help with oxygenation        Subjective- Patient unable to tolerate weaning of O2. Remains on 15NC/15NRB  unable to see improvement in O2 sat  Ronchi left chest posteriorly  Patine is laboring to breathe.  Questionable  fluid overload Check BNP and repeat CXR today to see if pneumonia is worsening  CXR not changed  No evidence of CHF  SOB most likely Covid 19 induced     MEDICATIONS  (STANDING):  allopurinol 300 milliGRAM(s) Oral daily  dorzolamide 2%/timolol 0.5% Ophthalmic Solution 1 Drop(s) Both EYES two times a day  enoxaparin Injectable 40 milliGRAM(s) SubCutaneous daily  furosemide   Injectable 20 milliGRAM(s) IV Push daily  latanoprost 0.005% Ophthalmic Solution 1 Drop(s) Both EYES at bedtime  lisinopril 20 milliGRAM(s) Oral daily  piperacillin/tazobactam IVPB.. 3.375 Gram(s) IV Intermittent every 8 hours    MEDICATIONS  (PRN):  acetaminophen   Tablet .. 650 milliGRAM(s) Oral every 6 hours PRN Temp greater or equal to 38C (100.4F)  benzocaine 15 mG/menthol 3.6 mG (Sugar-Free) Lozenge 1 Lozenge Oral four times a day PRN Cough  benzonatate 100 milliGRAM(s) Oral three times a day PRN Cough  guaiFENesin   Syrup  (Sugar-Free) 200 milliGRAM(s) Oral four times a day PRN Cough  sodium chloride 0.65% Nasal 1 Spray(s) Both Nostrils four times a day PRN Nasal Congestion    Vital Signs Last 24 Hrs  T(C): 36.6 (12 Apr 2020 13:42), Max: 37 (11 Apr 2020 21:43)  T(F): 97.9 (12 Apr 2020 13:42), Max: 98.6 (11 Apr 2020 21:43)  HR: 109 (12 Apr 2020 13:42) (97 - 112)  BP: 134/79 (12 Apr 2020 13:42) (114/73 - 134/79)  BP(mean): --  RR: 18 (12 Apr 2020 13:42) (18 - 20)  SpO2: 88% (12 Apr 2020 13:42) (88% - 91%)        LABS:              CBC Full  -  ( 12 Apr 2020 14:18 )  WBC Count : 13.44 K/uL  RBC Count : 5.12 M/uL  Hemoglobin : 15.4 g/dL  Hematocrit : 48.9 %  Platelet Count - Automated : 392 K/uL  Mean Cell Volume : 95.5 fl  Mean Cell Hemoglobin : 30.1 pg  Mean Cell Hemoglobin Concentration : 31.5 gm/dL  Auto Neutrophil # : x  Auto Lymphocyte # : x  Auto Monocyte # : x  Auto Eosinophil # : x  Auto Basophil # : x  Auto Neutrophil % : x  Auto Lymphocyte % : x  Auto Monocyte % : x  Auto Eosinophil % : x  Auto Basophil % : x    04-12    141  |  91<L>  |  44<H>  ----------------------------<  171<H>  3.5   |  32<H>  |  0.77    Ca    10.2      12 Apr 2020 14:18  Mg     2.1     04-12    TPro  7.2  /  Alb  2.9<L>  /  TBili  1.0  /  DBili  x   /  AST  39  /  ALT  72<H>  /  AlkPhos  144<H>  04-12    LIVER FUNCTIONS - ( 12 Apr 2020 14:18 )  Alb: 2.9 g/dL / Pro: 7.2 g/dL / ALK PHOS: 144 U/L / ALT: 72 U/L / AST: 39 U/L / GGT: x                 CBC Full  -  ( 10 Apr 2020 15:36 )  WBC Count : 14.76 K/uL  RBC Count : 4.64 M/uL  Hemoglobin : 13.9 g/dL  Hematocrit : 43.7 %  Platelet Count - Automated : 470 K/uL  Mean Cell Volume : 94.2 fl  Mean Cell Hemoglobin : 30.0 pg  Mean Cell Hemoglobin Concentration : 31.8 gm/dL  Auto Neutrophil # : x  Auto Lymphocyte # : x  Auto Monocyte # : x  Auto Eosinophil # : x  Auto Basophil # : x  Auto Neutrophil % : x  Auto Lymphocyte % : x  Auto Monocyte % : x  Auto Eosinophil % : x  Auto Basophil % : x    04-10    141  |  96  |  34<H>  ----------------------------<  206<H>  3.2<L>   |  28  |  0.71    Ca    9.2      10 Apr 2020 15:36    TPro  6.3  /  Alb  2.7<L>  /  TBili  0.6  /  DBili  x   /  AST  39  /  ALT  93<H>  /  AlkPhos  127<H>  04-10    LIVER FUNCTIONS - ( 10 Apr 2020 15:36 )  Alb: 2.7 g/dL / Pro: 6.3 g/dL / ALK PHOS: 127 U/L / ALT: 93 U/L / AST: 39 U/L / GGT: x                 CBC Full  -  ( 09 Apr 2020 10:13 )  WBC Count : 17.84 K/uL  RBC Count : 5.14 M/uL  Hemoglobin : 15.8 g/dL  Hematocrit : 48.1 %  Platelet Count - Automated : 604 K/uL  Mean Cell Volume : 93.6 fl  Mean Cell Hemoglobin : 30.7 pg  Mean Cell Hemoglobin Concentration : 32.8 gm/dL  Auto Neutrophil # : x  Auto Lymphocyte # : x  Auto Monocyte # : x  Auto Eosinophil # : x  Auto Basophil # : x  Auto Neutrophil % : x  Auto Lymphocyte % : x  Auto Monocyte % : x  Auto Eosinophil % : x  Auto Basophil % : x    04-09    139  |  91<L>  |  45<H>  ----------------------------<  322<H>  3.7   |  30  |  0.70    Ca    9.9      09 Apr 2020 10:13            CAPILLARY BLOOD GLUCOSE          RADIOLOGY & ADDITIONAL TESTS:

## 2020-04-12 NOTE — PROGRESS NOTE ADULT - PROBLEM SELECTOR PLAN 1
-CRP and ferritin trending down  -continue steroids and Kineret  -continue supportive care  vigorous incentive spirometry  -s/p plaquenil and azithromycin  -O2 sats are stable in the low 90s  -encourage proning or lateral decubitus positioning

## 2020-04-13 LAB
ALBUMIN SERPL ELPH-MCNC: 2.6 G/DL — LOW (ref 3.3–5)
ALP SERPL-CCNC: 135 U/L — HIGH (ref 40–120)
ALT FLD-CCNC: 54 U/L — HIGH (ref 10–45)
ANION GAP SERPL CALC-SCNC: 20 MMOL/L — HIGH (ref 5–17)
AST SERPL-CCNC: 29 U/L — SIGNIFICANT CHANGE UP (ref 10–40)
BASOPHILS # BLD AUTO: 0.03 K/UL — SIGNIFICANT CHANGE UP (ref 0–0.2)
BASOPHILS NFR BLD AUTO: 0.2 % — SIGNIFICANT CHANGE UP (ref 0–2)
BILIRUB SERPL-MCNC: 0.8 MG/DL — SIGNIFICANT CHANGE UP (ref 0.2–1.2)
BUN SERPL-MCNC: 53 MG/DL — HIGH (ref 7–23)
CALCIUM SERPL-MCNC: 10.4 MG/DL — SIGNIFICANT CHANGE UP (ref 8.4–10.5)
CHLORIDE SERPL-SCNC: 95 MMOL/L — LOW (ref 96–108)
CO2 SERPL-SCNC: 30 MMOL/L — SIGNIFICANT CHANGE UP (ref 22–31)
CREAT SERPL-MCNC: 0.75 MG/DL — SIGNIFICANT CHANGE UP (ref 0.5–1.3)
CRP SERPL-MCNC: 26.02 MG/DL — HIGH (ref 0–0.4)
D DIMER BLD IA.RAPID-MCNC: 645 NG/ML DDU — HIGH
EOSINOPHIL # BLD AUTO: 0 K/UL — SIGNIFICANT CHANGE UP (ref 0–0.5)
EOSINOPHIL NFR BLD AUTO: 0 % — SIGNIFICANT CHANGE UP (ref 0–6)
FERRITIN SERPL-MCNC: 2757 NG/ML — HIGH (ref 15–150)
GLUCOSE SERPL-MCNC: 206 MG/DL — HIGH (ref 70–99)
HCT VFR BLD CALC: 47.1 % — HIGH (ref 34.5–45)
HGB BLD-MCNC: 14.8 G/DL — SIGNIFICANT CHANGE UP (ref 11.5–15.5)
IMM GRANULOCYTES NFR BLD AUTO: 0.6 % — SIGNIFICANT CHANGE UP (ref 0–1.5)
LYMPHOCYTES # BLD AUTO: 1.08 K/UL — SIGNIFICANT CHANGE UP (ref 1–3.3)
LYMPHOCYTES # BLD AUTO: 6.1 % — LOW (ref 13–44)
MAGNESIUM SERPL-MCNC: 2.2 MG/DL — SIGNIFICANT CHANGE UP (ref 1.6–2.6)
MCHC RBC-ENTMCNC: 30.1 PG — SIGNIFICANT CHANGE UP (ref 27–34)
MCHC RBC-ENTMCNC: 31.4 GM/DL — LOW (ref 32–36)
MCV RBC AUTO: 95.7 FL — SIGNIFICANT CHANGE UP (ref 80–100)
MONOCYTES # BLD AUTO: 0.45 K/UL — SIGNIFICANT CHANGE UP (ref 0–0.9)
MONOCYTES NFR BLD AUTO: 2.5 % — SIGNIFICANT CHANGE UP (ref 2–14)
NEUTROPHILS # BLD AUTO: 16.13 K/UL — HIGH (ref 1.8–7.4)
NEUTROPHILS NFR BLD AUTO: 90.6 % — HIGH (ref 43–77)
NRBC # BLD: 0 /100 WBCS — SIGNIFICANT CHANGE UP (ref 0–0)
PHOSPHATE SERPL-MCNC: 3.7 MG/DL — SIGNIFICANT CHANGE UP (ref 2.5–4.5)
PLATELET # BLD AUTO: 445 K/UL — HIGH (ref 150–400)
POTASSIUM SERPL-MCNC: 3.5 MMOL/L — SIGNIFICANT CHANGE UP (ref 3.5–5.3)
POTASSIUM SERPL-SCNC: 3.5 MMOL/L — SIGNIFICANT CHANGE UP (ref 3.5–5.3)
PROT SERPL-MCNC: 7.3 G/DL — SIGNIFICANT CHANGE UP (ref 6–8.3)
RBC # BLD: 4.92 M/UL — SIGNIFICANT CHANGE UP (ref 3.8–5.2)
RBC # FLD: 13.5 % — SIGNIFICANT CHANGE UP (ref 10.3–14.5)
SODIUM SERPL-SCNC: 145 MMOL/L — SIGNIFICANT CHANGE UP (ref 135–145)
WBC # BLD: 17.8 K/UL — HIGH (ref 3.8–10.5)
WBC # FLD AUTO: 17.8 K/UL — HIGH (ref 3.8–10.5)

## 2020-04-13 RX ADMIN — DORZOLAMIDE HYDROCHLORIDE TIMOLOL MALEATE 1 DROP(S): 20; 5 SOLUTION/ DROPS OPHTHALMIC at 05:07

## 2020-04-13 RX ADMIN — LISINOPRIL 20 MILLIGRAM(S): 2.5 TABLET ORAL at 05:08

## 2020-04-13 RX ADMIN — PIPERACILLIN AND TAZOBACTAM 25 GRAM(S): 4; .5 INJECTION, POWDER, LYOPHILIZED, FOR SOLUTION INTRAVENOUS at 14:25

## 2020-04-13 RX ADMIN — DORZOLAMIDE HYDROCHLORIDE TIMOLOL MALEATE 1 DROP(S): 20; 5 SOLUTION/ DROPS OPHTHALMIC at 18:58

## 2020-04-13 RX ADMIN — ENOXAPARIN SODIUM 60 MILLIGRAM(S): 100 INJECTION SUBCUTANEOUS at 11:00

## 2020-04-13 RX ADMIN — Medication 300 MILLIGRAM(S): at 13:09

## 2020-04-13 RX ADMIN — ENOXAPARIN SODIUM 60 MILLIGRAM(S): 100 INJECTION SUBCUTANEOUS at 22:36

## 2020-04-13 RX ADMIN — PIPERACILLIN AND TAZOBACTAM 25 GRAM(S): 4; .5 INJECTION, POWDER, LYOPHILIZED, FOR SOLUTION INTRAVENOUS at 22:36

## 2020-04-13 RX ADMIN — PIPERACILLIN AND TAZOBACTAM 25 GRAM(S): 4; .5 INJECTION, POWDER, LYOPHILIZED, FOR SOLUTION INTRAVENOUS at 05:08

## 2020-04-13 RX ADMIN — LATANOPROST 1 DROP(S): 0.05 SOLUTION/ DROPS OPHTHALMIC; TOPICAL at 22:37

## 2020-04-13 RX ADMIN — Medication 20 MILLIGRAM(S): at 05:08

## 2020-04-13 NOTE — PROGRESS NOTE ADULT - PROBLEM SELECTOR PLAN 1
-CRP and ferritin trending down  -s/p steroids and Kineret  -continue supportive care  vigorous incentive spirometry  -s/p plaquenil and azithromycin  -O2 sats are stable in the low 90s  -encourage proning or lateral decubitus positioning

## 2020-04-13 NOTE — PROGRESS NOTE ADULT - PROBLEM SELECTOR PLAN 1
-CRP and ferritin trending down  -continue steroids and Kineret  -continue supportive care  Push nutritional support  See if waterbased nutritional supplements available instead of very thick consistency supplements  vigorous incentive spirometry  -s/p plaquenil and azithromycin  -O2 sats are stable in the low 90s  -encourage proning or lateral decubitus positioning

## 2020-04-13 NOTE — PROGRESS NOTE ADULT - SUBJECTIVE AND OBJECTIVE BOX
70 yo F with PMhx of HTN and prediabetes presents to the ED with complaints of fever, chills, dry cough and worsening SOB.     Overnight- No acute events overnight Needed to be in prone position to help with oxygenation  Seems to have m ore energy today Less rhonchi  but still with difficulty oxygenating    Subjective- Patient unable to tolerate weaning of O2. Remains on 15NC/15NRB  unable to see improvement in O2 sat  Ronchi left chest posteriorly  Patine is laboring to breathe.  Questionable  fluid overload Check BNP and repeat CXR today to see if pneumonia is worsening  CXR not changed  No evidence of CHF  SOB most likely Covid 19 induced     MEDICATIONS  (STANDING):  allopurinol 300 milliGRAM(s) Oral daily  dorzolamide 2%/timolol 0.5% Ophthalmic Solution 1 Drop(s) Both EYES two times a day  enoxaparin Injectable 40 milliGRAM(s) SubCutaneous daily  furosemide   Injectable 20 milliGRAM(s) IV Push daily  latanoprost 0.005% Ophthalmic Solution 1 Drop(s) Both EYES at bedtime  lisinopril 20 milliGRAM(s) Oral daily  piperacillin/tazobactam IVPB.. 3.375 Gram(s) IV Intermittent every 8 hours    MEDICATIONS  (PRN):  acetaminophen   Tablet .. 650 milliGRAM(s) Oral every 6 hours PRN Temp greater or equal to 38C (100.4F)  benzocaine 15 mG/menthol 3.6 mG (Sugar-Free) Lozenge 1 Lozenge Oral four times a day PRN Cough  benzonatate 100 milliGRAM(s) Oral three times a day PRN Cough  guaiFENesin   Syrup  (Sugar-Free) 200 milliGRAM(s) Oral four times a day PRN Cough  sodium chloride 0.65% Nasal 1 Spray(s) Both Nostrils four times a day PRN Nasal Congestion  ICU Vital Signs Last 24 Hrs  T(C): 36.9 (13 Apr 2020 13:48), Max: 36.9 (13 Apr 2020 13:48)  T(F): 98.4 (13 Apr 2020 13:48), Max: 98.4 (13 Apr 2020 13:48)  HR: 103 (13 Apr 2020 13:48) (95 - 103)  BP: 113/76 (13 Apr 2020 13:48) (113/76 - 139/89)  RR: 20 (13 Apr 2020 13:48) (20 - 20)  SpO2: 92% (13 Apr 2020 13:48) (84% - 92%)          LABS:              CBC Full  -  ( 13 Apr 2020 13:25 )  WBC Count : 17.80 K/uL  RBC Count : 4.92 M/uL  Hemoglobin : 14.8 g/dL  Hematocrit : 47.1 %  Platelet Count - Automated : 445 K/uL  Mean Cell Volume : 95.7 fl  Mean Cell Hemoglobin : 30.1 pg  Mean Cell Hemoglobin Concentration : 31.4 gm/dL  Auto Neutrophil # : 16.13 K/uL  Auto Lymphocyte # : 1.08 K/uL  Auto Monocyte # : 0.45 K/uL  Auto Eosinophil # : 0.00 K/uL  Auto Basophil # : 0.03 K/uL  Auto Neutrophil % : 90.6 %  Auto Lymphocyte % : 6.1 %  Auto Monocyte % : 2.5 %  Auto Eosinophil % : 0.0 %  Auto Basophil % : 0.2 %    04-13    145  |  95<L>  |  53<H>  ----------------------------<  206<H>  3.5   |  30  |  0.75    Ca    10.4      13 Apr 2020 13:25  Phos  3.7     04-13  Mg     2.2     04-13    TPro  7.3  /  Alb  2.6<L>  /  TBili  0.8  /  DBili  x   /  AST  29  /  ALT  54<H>  /  AlkPhos  135<H>  04-13    LIVER FUNCTIONS - ( 13 Apr 2020 13:25 )  Alb: 2.6 g/dL / Pro: 7.3 g/dL / ALK PHOS: 135 U/L / ALT: 54 U/L / AST: 29 U/L / GGT: x                   CBC Full  -  ( 12 Apr 2020 14:18 )  WBC Count : 13.44 K/uL  RBC Count : 5.12 M/uL  Hemoglobin : 15.4 g/dL  Hematocrit : 48.9 %  Platelet Count - Automated : 392 K/uL  Mean Cell Volume : 95.5 fl  Mean Cell Hemoglobin : 30.1 pg  Mean Cell Hemoglobin Concentration : 31.5 gm/dL  Auto Neutrophil # : x  Auto Lymphocyte # : x  Auto Monocyte # : x  Auto Eosinophil # : x  Auto Basophil # : x  Auto Neutrophil % : x  Auto Lymphocyte % : x  Auto Monocyte % : x  Auto Eosinophil % : x  Auto Basophil % : x    04-12    141  |  91<L>  |  44<H>  ----------------------------<  171<H>  3.5   |  32<H>  |  0.77    Ca    10.2      12 Apr 2020 14:18  Mg     2.1     04-12    TPro  7.2  /  Alb  2.9<L>  /  TBili  1.0  /  DBili  x   /  AST  39  /  ALT  72<H>  /  AlkPhos  144<H>  04-12    LIVER FUNCTIONS - ( 12 Apr 2020 14:18 )  Alb: 2.9 g/dL / Pro: 7.2 g/dL / ALK PHOS: 144 U/L / ALT: 72 U/L / AST: 39 U/L / GGT: x                 CBC Full  -  ( 10 Apr 2020 15:36 )  WBC Count : 14.76 K/uL  RBC Count : 4.64 M/uL  Hemoglobin : 13.9 g/dL  Hematocrit : 43.7 %  Platelet Count - Automated : 470 K/uL  Mean Cell Volume : 94.2 fl  Mean Cell Hemoglobin : 30.0 pg  Mean Cell Hemoglobin Concentration : 31.8 gm/dL  Auto Neutrophil # : x  Auto Lymphocyte # : x  Auto Monocyte # : x  Auto Eosinophil # : x  Auto Basophil # : x  Auto Neutrophil % : x  Auto Lymphocyte % : x  Auto Monocyte % : x  Auto Eosinophil % : x  Auto Basophil % : x    04-10    141  |  96  |  34<H>  ----------------------------<  206<H>  3.2<L>   |  28  |  0.71    Ca    9.2      10 Apr 2020 15:36    TPro  6.3  /  Alb  2.7<L>  /  TBili  0.6  /  DBili  x   /  AST  39  /  ALT  93<H>  /  AlkPhos  127<H>  04-10    LIVER FUNCTIONS - ( 10 Apr 2020 15:36 )  Alb: 2.7 g/dL / Pro: 6.3 g/dL / ALK PHOS: 127 U/L / ALT: 93 U/L / AST: 39 U/L / GGT: x                 CBC Full  -  ( 09 Apr 2020 10:13 )  WBC Count : 17.84 K/uL  RBC Count : 5.14 M/uL  Hemoglobin : 15.8 g/dL  Hematocrit : 48.1 %  Platelet Count - Automated : 604 K/uL  Mean Cell Volume : 93.6 fl  Mean Cell Hemoglobin : 30.7 pg  Mean Cell Hemoglobin Concentration : 32.8 gm/dL  Auto Neutrophil # : x  Auto Lymphocyte # : x  Auto Monocyte # : x  Auto Eosinophil # : x  Auto Basophil # : x  Auto Neutrophil % : x  Auto Lymphocyte % : x  Auto Monocyte % : x  Auto Eosinophil % : x  Auto Basophil % : x    04-09    139  |  91<L>  |  45<H>  ----------------------------<  322<H>  3.7   |  30  |  0.70    Ca    9.9      09 Apr 2020 10:13            CAPILLARY BLOOD GLUCOSE          RADIOLOGY & ADDITIONAL TESTS:

## 2020-04-13 NOTE — PROGRESS NOTE ADULT - PROBLEM SELECTOR PLAN 2
Currently on NRB 15 + NC 15   sats have been 89%-91% encourage deep breathing  exercises  -patient currently full code  -repeat CXR shows slightly worsening airspace opacities, but already started on lasix IV. will continue to monitor. Patient also started on Zosyn x 7 days for PNA

## 2020-04-13 NOTE — PROGRESS NOTE ADULT - SUBJECTIVE AND OBJECTIVE BOX
Jolynn Madsen MD   PGY-3 Dermatology   [phone]    68 yo F with PMhx of HTN and prediabetes presents to the ED with complaints of fever, chills, dry cough and worsening SOB.     Overnight- No acute events overnight. Patient has been remaining proned as much as possible.      Subjective- Patient still unable to wean from 02. Notes she is very thirsty and the mouth is very dry.      MEDICATIONS  (STANDING):  allopurinol 300 milliGRAM(s) Oral daily  dorzolamide 2%/timolol 0.5% Ophthalmic Solution 1 Drop(s) Both EYES two times a day  enoxaparin Injectable 40 milliGRAM(s) SubCutaneous daily  furosemide   Injectable 20 milliGRAM(s) IV Push daily  latanoprost 0.005% Ophthalmic Solution 1 Drop(s) Both EYES at bedtime  lisinopril 20 milliGRAM(s) Oral daily  piperacillin/tazobactam IVPB.. 3.375 Gram(s) IV Intermittent every 8 hours    MEDICATIONS  (PRN):  acetaminophen   Tablet .. 650 milliGRAM(s) Oral every 6 hours PRN Temp greater or equal to 38C (100.4F)  benzocaine 15 mG/menthol 3.6 mG (Sugar-Free) Lozenge 1 Lozenge Oral four times a day PRN Cough  benzonatate 100 milliGRAM(s) Oral three times a day PRN Cough  guaiFENesin   Syrup  (Sugar-Free) 200 milliGRAM(s) Oral four times a day PRN Cough  sodium chloride 0.65% Nasal 1 Spray(s) Both Nostrils four times a day PRN Nasal Congestion    Vital Signs Last 24 Hrs  T(C): 36.4 (11 Apr 2020 05:38), Max: 38.1 (10 Apr 2020 20:02)  T(F): 97.6 (11 Apr 2020 05:38), Max: 100.6 (10 Apr 2020 20:02)  HR: 114 (11 Apr 2020 05:38) (114 - 121)  BP: 126/84 (11 Apr 2020 05:38) (118/64 - 135/84)  BP(mean): --  RR: 20 (11 Apr 2020 05:38) (20 - 20)  SpO2: 90% (11 Apr 2020 05:38) (90% - 91%)        LABS:            CBC Full  -  ( 10 Apr 2020 15:36 )  WBC Count : 14.76 K/uL  RBC Count : 4.64 M/uL  Hemoglobin : 13.9 g/dL  Hematocrit : 43.7 %  Platelet Count - Automated : 470 K/uL  Mean Cell Volume : 94.2 fl  Mean Cell Hemoglobin : 30.0 pg  Mean Cell Hemoglobin Concentration : 31.8 gm/dL  Auto Neutrophil # : x  Auto Lymphocyte # : x  Auto Monocyte # : x  Auto Eosinophil # : x  Auto Basophil # : x  Auto Neutrophil % : x  Auto Lymphocyte % : x  Auto Monocyte % : x  Auto Eosinophil % : x  Auto Basophil % : x    04-10    141  |  96  |  34<H>  ----------------------------<  206<H>  3.2<L>   |  28  |  0.71    Ca    9.2      10 Apr 2020 15:36    TPro  6.3  /  Alb  2.7<L>  /  TBili  0.6  /  DBili  x   /  AST  39  /  ALT  93<H>  /  AlkPhos  127<H>  04-10    LIVER FUNCTIONS - ( 10 Apr 2020 15:36 )  Alb: 2.7 g/dL / Pro: 6.3 g/dL / ALK PHOS: 127 U/L / ALT: 93 U/L / AST: 39 U/L / GGT: x                 CBC Full  -  ( 09 Apr 2020 10:13 )  WBC Count : 17.84 K/uL  RBC Count : 5.14 M/uL  Hemoglobin : 15.8 g/dL  Hematocrit : 48.1 %  Platelet Count - Automated : 604 K/uL  Mean Cell Volume : 93.6 fl  Mean Cell Hemoglobin : 30.7 pg  Mean Cell Hemoglobin Concentration : 32.8 gm/dL  Auto Neutrophil # : x  Auto Lymphocyte # : x  Auto Monocyte # : x  Auto Eosinophil # : x  Auto Basophil # : x  Auto Neutrophil % : x  Auto Lymphocyte % : x  Auto Monocyte % : x  Auto Eosinophil % : x  Auto Basophil % : x    04-09    139  |  91<L>  |  45<H>  ----------------------------<  322<H>  3.7   |  30  |  0.70    Ca    9.9      09 Apr 2020 10:13            CAPILLARY BLOOD GLUCOSE          RADIOLOGY & ADDITIONAL TESTS:

## 2020-04-13 NOTE — CHART NOTE - NSCHARTNOTEFT_GEN_A_CORE
Nutrition Follow-up Note  Patient seen for: LOS follow up     Source of Information: medical record, RN; Unable to conduct a face to face interview or nutrition-focused physical exam at this time due to limited contact restrictions related to patient's medical condition and isolation precautions.     Current Diet: renal, consistent CHO diet     Subjective Information: As per RN, pt c limited intake at this time as saturation decreases when pt takes mask off.     Objective Information: Pt COVID-19 positive, noted pt placed in prone position on 4/12 and had RRT on 4/11, per MD noted, no evidence of CHF at this time.     Weight/Medications/labs reviewed. No new wt at this time. No edema noted at this time.     Estimated nutrient needs: unchanged since initial note     Previous Nutrition Diagnosis: none     New Nutrition Diagnosis: inadequate oral intake related to current medical condition as evidenced by decreased PO intake in house     Nutrition Care Plan:  [x] In progress   [ ] Achieved    Nutrition Interventions: health shakes    Recommendations:  1. Continue c current diet as concern for fluid overload.  2. Will provide health shakes two times daily to provide additional calories and protein.   3. Continue to provide assistance and encouragement c all meals and snacks.     Monitoring and Evaluation:   Continue to monitor Nutritional intake, Tolerance to diet prescription, weights, labs, skin integrity    RD remains available upon request and will follow up per protocol  Carolyne Gay MS RD CDN [facility] #[phone]

## 2020-04-14 LAB
ALBUMIN SERPL ELPH-MCNC: 2.5 G/DL — LOW (ref 3.3–5)
ALP SERPL-CCNC: 135 U/L — HIGH (ref 40–120)
ALT FLD-CCNC: 53 U/L — HIGH (ref 10–45)
ANION GAP SERPL CALC-SCNC: 20 MMOL/L — HIGH (ref 5–17)
AST SERPL-CCNC: 54 U/L — HIGH (ref 10–40)
BASOPHILS # BLD AUTO: 0.02 K/UL — SIGNIFICANT CHANGE UP (ref 0–0.2)
BASOPHILS NFR BLD AUTO: 0.1 % — SIGNIFICANT CHANGE UP (ref 0–2)
BILIRUB SERPL-MCNC: 1.1 MG/DL — SIGNIFICANT CHANGE UP (ref 0.2–1.2)
BUN SERPL-MCNC: 48 MG/DL — HIGH (ref 7–23)
CALCIUM SERPL-MCNC: 10.1 MG/DL — SIGNIFICANT CHANGE UP (ref 8.4–10.5)
CHLORIDE SERPL-SCNC: 91 MMOL/L — LOW (ref 96–108)
CK MB BLD-MCNC: 2.4 % — SIGNIFICANT CHANGE UP (ref 0–3.5)
CK MB CFR SERPL CALC: 3.6 NG/ML — SIGNIFICANT CHANGE UP (ref 0–3.8)
CK SERPL-CCNC: 149 U/L — SIGNIFICANT CHANGE UP (ref 25–170)
CO2 SERPL-SCNC: 30 MMOL/L — SIGNIFICANT CHANGE UP (ref 22–31)
CREAT SERPL-MCNC: 0.62 MG/DL — SIGNIFICANT CHANGE UP (ref 0.5–1.3)
EOSINOPHIL # BLD AUTO: 0 K/UL — SIGNIFICANT CHANGE UP (ref 0–0.5)
EOSINOPHIL NFR BLD AUTO: 0 % — SIGNIFICANT CHANGE UP (ref 0–6)
GAS PNL BLDA: SIGNIFICANT CHANGE UP
GLUCOSE SERPL-MCNC: 173 MG/DL — HIGH (ref 70–99)
HCT VFR BLD CALC: 48.6 % — HIGH (ref 34.5–45)
HGB BLD-MCNC: 15.5 G/DL — SIGNIFICANT CHANGE UP (ref 11.5–15.5)
IMM GRANULOCYTES NFR BLD AUTO: 0.7 % — SIGNIFICANT CHANGE UP (ref 0–1.5)
LYMPHOCYTES # BLD AUTO: 1.34 K/UL — SIGNIFICANT CHANGE UP (ref 1–3.3)
LYMPHOCYTES # BLD AUTO: 8 % — LOW (ref 13–44)
MAGNESIUM SERPL-MCNC: 2.3 MG/DL — SIGNIFICANT CHANGE UP (ref 1.6–2.6)
MCHC RBC-ENTMCNC: 30.3 PG — SIGNIFICANT CHANGE UP (ref 27–34)
MCHC RBC-ENTMCNC: 31.9 GM/DL — LOW (ref 32–36)
MCV RBC AUTO: 94.9 FL — SIGNIFICANT CHANGE UP (ref 80–100)
MONOCYTES # BLD AUTO: 0.46 K/UL — SIGNIFICANT CHANGE UP (ref 0–0.9)
MONOCYTES NFR BLD AUTO: 2.7 % — SIGNIFICANT CHANGE UP (ref 2–14)
NEUTROPHILS # BLD AUTO: 14.82 K/UL — HIGH (ref 1.8–7.4)
NEUTROPHILS NFR BLD AUTO: 88.5 % — HIGH (ref 43–77)
NRBC # BLD: 0 /100 WBCS — SIGNIFICANT CHANGE UP (ref 0–0)
PHOSPHATE SERPL-MCNC: 4.2 MG/DL — SIGNIFICANT CHANGE UP (ref 2.5–4.5)
PLATELET # BLD AUTO: 424 K/UL — HIGH (ref 150–400)
POTASSIUM SERPL-MCNC: 5.4 MMOL/L — HIGH (ref 3.5–5.3)
POTASSIUM SERPL-SCNC: 5.4 MMOL/L — HIGH (ref 3.5–5.3)
PROT SERPL-MCNC: 8 G/DL — SIGNIFICANT CHANGE UP (ref 6–8.3)
RBC # BLD: 5.12 M/UL — SIGNIFICANT CHANGE UP (ref 3.8–5.2)
RBC # FLD: 13.4 % — SIGNIFICANT CHANGE UP (ref 10.3–14.5)
SODIUM SERPL-SCNC: 141 MMOL/L — SIGNIFICANT CHANGE UP (ref 135–145)
TROPONIN T, HIGH SENSITIVITY RESULT: 19 NG/L — SIGNIFICANT CHANGE UP (ref 0–51)
WBC # BLD: 16.75 K/UL — HIGH (ref 3.8–10.5)
WBC # FLD AUTO: 16.75 K/UL — HIGH (ref 3.8–10.5)

## 2020-04-14 PROCEDURE — 71045 X-RAY EXAM CHEST 1 VIEW: CPT | Mod: 26

## 2020-04-14 RX ORDER — POTASSIUM CHLORIDE 20 MEQ
20 PACKET (EA) ORAL
Refills: 0 | Status: DISCONTINUED | OUTPATIENT
Start: 2020-04-14 | End: 2020-04-14

## 2020-04-14 RX ORDER — ACETAMINOPHEN 500 MG
1000 TABLET ORAL ONCE
Refills: 0 | Status: COMPLETED | OUTPATIENT
Start: 2020-04-14 | End: 2020-04-14

## 2020-04-14 RX ADMIN — LISINOPRIL 20 MILLIGRAM(S): 2.5 TABLET ORAL at 05:43

## 2020-04-14 RX ADMIN — Medication 400 MILLIGRAM(S): at 12:22

## 2020-04-14 RX ADMIN — DORZOLAMIDE HYDROCHLORIDE TIMOLOL MALEATE 1 DROP(S): 20; 5 SOLUTION/ DROPS OPHTHALMIC at 05:43

## 2020-04-14 RX ADMIN — Medication 300 MILLIGRAM(S): at 14:41

## 2020-04-14 RX ADMIN — PIPERACILLIN AND TAZOBACTAM 25 GRAM(S): 4; .5 INJECTION, POWDER, LYOPHILIZED, FOR SOLUTION INTRAVENOUS at 05:43

## 2020-04-14 RX ADMIN — DORZOLAMIDE HYDROCHLORIDE TIMOLOL MALEATE 1 DROP(S): 20; 5 SOLUTION/ DROPS OPHTHALMIC at 17:59

## 2020-04-14 RX ADMIN — PIPERACILLIN AND TAZOBACTAM 25 GRAM(S): 4; .5 INJECTION, POWDER, LYOPHILIZED, FOR SOLUTION INTRAVENOUS at 14:30

## 2020-04-14 RX ADMIN — LATANOPROST 1 DROP(S): 0.05 SOLUTION/ DROPS OPHTHALMIC; TOPICAL at 22:35

## 2020-04-14 RX ADMIN — ENOXAPARIN SODIUM 60 MILLIGRAM(S): 100 INJECTION SUBCUTANEOUS at 22:34

## 2020-04-14 RX ADMIN — ENOXAPARIN SODIUM 60 MILLIGRAM(S): 100 INJECTION SUBCUTANEOUS at 14:40

## 2020-04-14 RX ADMIN — Medication 20 MILLIGRAM(S): at 05:42

## 2020-04-14 RX ADMIN — PIPERACILLIN AND TAZOBACTAM 25 GRAM(S): 4; .5 INJECTION, POWDER, LYOPHILIZED, FOR SOLUTION INTRAVENOUS at 22:34

## 2020-04-14 NOTE — PROGRESS NOTE ADULT - SUBJECTIVE AND OBJECTIVE BOX
Jolynn Madsen MD   PGY-3 Dermatology   [id number]    68 yo F with PMhx of HTN and prediabetes presents to the ED with complaints of fever, chills, dry cough and worsening SOB.     Overnight- No acute events overnight. Patient has been remaining proned as much as possible.      Subjective- Patient still unable to wean from 02. Notes she is very thirsty and the mouth is very dry.      MEDICATIONS  (STANDING):  allopurinol 300 milliGRAM(s) Oral daily  dorzolamide 2%/timolol 0.5% Ophthalmic Solution 1 Drop(s) Both EYES two times a day  enoxaparin Injectable 40 milliGRAM(s) SubCutaneous daily  furosemide   Injectable 20 milliGRAM(s) IV Push daily  latanoprost 0.005% Ophthalmic Solution 1 Drop(s) Both EYES at bedtime  lisinopril 20 milliGRAM(s) Oral daily  piperacillin/tazobactam IVPB.. 3.375 Gram(s) IV Intermittent every 8 hours    MEDICATIONS  (PRN):  acetaminophen   Tablet .. 650 milliGRAM(s) Oral every 6 hours PRN Temp greater or equal to 38C (100.4F)  benzocaine 15 mG/menthol 3.6 mG (Sugar-Free) Lozenge 1 Lozenge Oral four times a day PRN Cough  benzonatate 100 milliGRAM(s) Oral three times a day PRN Cough  guaiFENesin   Syrup  (Sugar-Free) 200 milliGRAM(s) Oral four times a day PRN Cough  sodium chloride 0.65% Nasal 1 Spray(s) Both Nostrils four times a day PRN Nasal Congestion           Vital Signs Last 24 Hrs  T(C): 36.5 (14 Apr 2020 05:38), Max: 36.9 (13 Apr 2020 13:48)  T(F): 97.7 (14 Apr 2020 05:38), Max: 98.4 (13 Apr 2020 13:48)  HR: 99 (14 Apr 2020 05:38) (99 - 114)  BP: 133/82 (14 Apr 2020 05:38) (113/76 - 149/85)  BP(mean): --  RR: 20 (14 Apr 2020 05:38) (20 - 20)  SpO2: 90% (14 Apr 2020 05:38) (90% - 92%)      PHYSICAL EXAM:    GENERAL: NAD, well nourished     HEAD:  Atraumatic  EYES: EOM, PERRLA, conjunctiva pink and sclera white  ENT: No tonsillar erythema, exudates, or enlargement, moist mucous membranes, good dentition, no lesions  NECK: Supple, No JVD, normal thyroid, carotids with normal upstrokes and no bruits  CHEST/LUNG: Clear to auscultation bilaterally, No rales, rhonchi, wheezing, or rubs  HEART: Regular rate and rhythm, No murmurs, rubs, or gallops  ABDOMEN: Soft, nondistended, no masses, guarding, tenderness or rebound, bowel sounds present  EXTREMITIES:  2+ Peripheral Pulses, No clubbing, cyanosis, or edema. No arthritis of shoulders, elbows, hands, hips, knees, ankles, or feet. No DJD C spine, T spine, or L/S spine  LYMPH: No lymphadenopathy noted  SKIN: No rashes or lesions  NERVOUS SYSTEM:  Alert & Oriented X3, normal cognitive function. Motor Strength 5/5 right upper and right lower.  5/5 left upper and left lower extremities, DTRs 2+ intact and symmetric

## 2020-04-14 NOTE — RAPID RESPONSE TEAM SUMMARY - NSSITUATIONBACKGROUNDRRT_GEN_ALL_CORE
Pt is a 70 yo with a hx of HTN and pre-DM admitted for acute hypoxic respiratory failure 2/2 to COVID-19 viral pneumonia. s/p Plaquenil, Anakinra, and solumedrol

## 2020-04-14 NOTE — PROGRESS NOTE ADULT - PROBLEM SELECTOR PLAN 2
Currently on NRB 15 + NC 15   sats have been < 90 when eating encourage deep breathing  -patient currently full code  -repeat CXR shows slightly worsening airspace opacities, but already started on lasix IV. will continue to monitor. Patient also started on Zosyn x 7 days for PNA

## 2020-04-14 NOTE — RAPID RESPONSE TEAM SUMMARY - NSOTHERINTERVENTIONSRRT_GEN_ALL_CORE
Proned patient. Will give tylenol
Recommendations:  - Would reach out to ID to determine if patient is eligible for additional clinical trials for COVID-19  - Follow up labs

## 2020-04-15 LAB — GLUCOSE BLDC GLUCOMTR-MCNC: 156 MG/DL — HIGH (ref 70–99)

## 2020-04-15 RX ADMIN — PIPERACILLIN AND TAZOBACTAM 25 GRAM(S): 4; .5 INJECTION, POWDER, LYOPHILIZED, FOR SOLUTION INTRAVENOUS at 23:51

## 2020-04-15 RX ADMIN — DORZOLAMIDE HYDROCHLORIDE TIMOLOL MALEATE 1 DROP(S): 20; 5 SOLUTION/ DROPS OPHTHALMIC at 18:36

## 2020-04-15 RX ADMIN — Medication 20 MILLIGRAM(S): at 05:56

## 2020-04-15 RX ADMIN — DORZOLAMIDE HYDROCHLORIDE TIMOLOL MALEATE 1 DROP(S): 20; 5 SOLUTION/ DROPS OPHTHALMIC at 05:56

## 2020-04-15 RX ADMIN — ENOXAPARIN SODIUM 60 MILLIGRAM(S): 100 INJECTION SUBCUTANEOUS at 21:35

## 2020-04-15 RX ADMIN — PIPERACILLIN AND TAZOBACTAM 25 GRAM(S): 4; .5 INJECTION, POWDER, LYOPHILIZED, FOR SOLUTION INTRAVENOUS at 18:36

## 2020-04-15 RX ADMIN — ENOXAPARIN SODIUM 60 MILLIGRAM(S): 100 INJECTION SUBCUTANEOUS at 11:09

## 2020-04-15 RX ADMIN — PIPERACILLIN AND TAZOBACTAM 25 GRAM(S): 4; .5 INJECTION, POWDER, LYOPHILIZED, FOR SOLUTION INTRAVENOUS at 05:56

## 2020-04-15 RX ADMIN — LATANOPROST 1 DROP(S): 0.05 SOLUTION/ DROPS OPHTHALMIC; TOPICAL at 21:35

## 2020-04-15 RX ADMIN — LISINOPRIL 20 MILLIGRAM(S): 2.5 TABLET ORAL at 05:55

## 2020-04-15 NOTE — PROGRESS NOTE ADULT - PROBLEM SELECTOR PLAN 2
Currently on NRB 15 + NC 15   sats have been < 90 when eating encourage deep breathing  -patient currently full code  -repeat CXR shows slightly worsening airspace opacities, but already started on lasix IV. will continue to monitor. Patient also started on Zosyn x 7 days for PNA Currently on NRB 15 + NC 15   sats have been < 90 when eating encourage deep breathing  -patient currently full code  -repeat CXR shows slightly worsening airspace opacities, but already started on lasix IV. will continue to monitor. Patient also started on Zosyn x 7 days for PNA (end tmrw)

## 2020-04-15 NOTE — PROGRESS NOTE ADULT - SUBJECTIVE AND OBJECTIVE BOX
oJlynn Madsen MD   PGY-3 Dermatology   [phone]    68 yo F with PMhx of HTN and prediabetes presents to the ED with complaints of fever, chills, dry cough and worsening SOB.     Overnight- No acute events overnight. Patient has been remaining prone as much as possible.  Symptomatically patient feels a little better but O2 requirements are not lessened.   Subjective- Patient still unable to wean from 02. Notes she is very thirsty and the mouth is very dry.      MEDICATIONS  (STANDING):  allopurinol 300 milliGRAM(s) Oral daily  dorzolamide 2%/timolol 0.5% Ophthalmic Solution 1 Drop(s) Both EYES two times a day  enoxaparin Injectable 40 milliGRAM(s) SubCutaneous daily  furosemide   Injectable 20 milliGRAM(s) IV Push daily  latanoprost 0.005% Ophthalmic Solution 1 Drop(s) Both EYES at bedtime  lisinopril 20 milliGRAM(s) Oral daily  piperacillin/tazobactam IVPB.. 3.375 Gram(s) IV Intermittent every 8 hours  acetaminophen   Tablet .. 650 milliGRAM(s) Oral every 6 hours PRN Temp greater or equal to 38C (100.4F)  benzocaine 15 mG/menthol 3.6 mG (Sugar-Free) Lozenge 1 Lozenge Oral four times a day PRN Cough  benzonatate 100 milliGRAM(s) Oral three times a day PRN Cough  guaiFENesin   Syrup  (Sugar-Free) 200 milliGRAM(s) Oral four times a day PRN Cough  sodium chloride 0.65% Nasal 1 Spray(s) Both Nostrils four times a day PRN Nasal Congestion           Vital Signs Last 24 Hrs  T(C): 36.9 (15 Apr 2020 20:59), Max: 36.9 (15 Apr 2020 20:59)  T(F): 98.5 (15 Apr 2020 20:59), Max: 98.5 (15 Apr 2020 20:59)  HR: 93 (15 Apr 2020 20:59) (89 - 93)  BP: 128/81 (15 Apr 2020 20:59) (126/78 - 128/83)  BP(mean): --  RR: 22 (15 Apr 2020 20:59) (18 - 22)  SpO2: 92% (15 Apr 2020 20:59) (92% - 92%)MEDICATIONS  (PRN):      PHYSICAL EXAM:    GENERAL: NAD, well nourished     HEAD:  Atraumatic  EYES: EOM, PERRLA, conjunctiva pink and sclera white  ENT: No tonsillar erythema, exudates, or enlargement, moist mucous membranes, good dentition, no lesions  NECK: Supple, No JVD, normal thyroid, carotids with normal upstrokes and no bruits  CHEST/LUNG: Clear to auscultation bilaterally, No rales, rhonchi, wheezing, or rubs  HEART: Regular rate and rhythm, No murmurs, rubs, or gallops  ABDOMEN: Soft, nondistended, no masses, guarding, tenderness or rebound, bowel sounds present  EXTREMITIES:  2+ Peripheral Pulses, No clubbing, cyanosis, or edema. No arthritis of shoulders, elbows, hands, hips, knees, ankles, or feet. No DJD C spine, T spine, or L/S spine  LYMPH: No lymphadenopathy noted  SKIN: No rashes or lesions  NERVOUS SYSTEM:  Alert & Oriented X3, normal cognitive function. Motor Strength 5/5 right upper and right lower.  5/5 left upper and left lower extremities, DTRs 2+ intact and symmetric

## 2020-04-15 NOTE — PROGRESS NOTE ADULT - PROBLEM SELECTOR PLAN 1
-CRP and ferritin trending down  -s/p steroids and Kineret  -continue supportive care  vigorous incentive spirometry  -s/p plaquenil and azithromycin  -O2 sats are stable in the low 90s  -encourage proning or lateral decubitus positioning -continue supportive care - currently proned on NRB  -s/p steroids and Kineret  -s/p plaquenil and azithromycin  -O2 sats are stable in the low 90s

## 2020-04-15 NOTE — PROGRESS NOTE ADULT - PROBLEM SELECTOR PLAN 2
Currently on NRB 15 + NC 15   sats have been < 90 when eating encourage deep breathing  -patient  full code  -repeat CXR shows slightly worsening airspace opacities, but already started on lasix IV. will continue to monitor. Patient also started on Zosyn x 7 days for PNA

## 2020-04-15 NOTE — PROGRESS NOTE ADULT - SUBJECTIVE AND OBJECTIVE BOX
Cecil Salgado PGY 1  Internal Medicine  [id number]      Progress Note:    Patient is a 69y old  Female who presents with a chief complaint of 69F p/w sob and fever (14 Apr 2020 11:18)      SUBJECTIVE / OVERNIGHT EVENTS:    MEDICATIONS  (STANDING):  allopurinol 300 milliGRAM(s) Oral daily  dorzolamide 2%/timolol 0.5% Ophthalmic Solution 1 Drop(s) Both EYES two times a day  enoxaparin Injectable 60 milliGRAM(s) SubCutaneous every 12 hours  furosemide   Injectable 20 milliGRAM(s) IV Push daily  latanoprost 0.005% Ophthalmic Solution 1 Drop(s) Both EYES at bedtime  lisinopril 20 milliGRAM(s) Oral daily  piperacillin/tazobactam IVPB.. 3.375 Gram(s) IV Intermittent every 8 hours    MEDICATIONS  (PRN):  acetaminophen   Tablet .. 650 milliGRAM(s) Oral every 6 hours PRN Temp greater or equal to 38C (100.4F)  benzocaine 15 mG/menthol 3.6 mG (Sugar-Free) Lozenge 1 Lozenge Oral four times a day PRN Cough  benzonatate 100 milliGRAM(s) Oral three times a day PRN Cough  guaiFENesin   Syrup  (Sugar-Free) 200 milliGRAM(s) Oral four times a day PRN Cough  sodium chloride 0.65% Nasal 1 Spray(s) Both Nostrils four times a day PRN Nasal Congestion      CAPILLARY BLOOD GLUCOSE        I&O's Summary      PHYSICAL EXAM:  Vital Signs Last 24 Hrs  T(C): 36.3 (15 Apr 2020 05:29), Max: 36.6 (14 Apr 2020 14:19)  T(F): 97.4 (15 Apr 2020 05:29), Max: 97.9 (14 Apr 2020 14:19)  HR: 88 (14 Apr 2020 21:22) (88 - 92)  BP: 128/83 (15 Apr 2020 05:29) (116/72 - 160/82)  BP(mean): --  RR: 19 (15 Apr 2020 05:29) (19 - 20)  SpO2: 91% (14 Apr 2020 21:22) (91% - 92%)    CONSTITUTIONAL: NAD, well-developed, resting comfortably   HEENT: NC/AT, PERRLA, EOMI, oropharnyx clear, no LAD  CV: RRR, normal S1,S2; no murmurs  RESPIRATORY: Normal respiratory effort; lungs CTAB, no crackles, rhonchi, or wheezes  ABDOMEN: soft, non-tender to palpation, non-distended, normal bowel sounds, no rebound/guarding; no hepatosplenomegaly  EXTREMITIES: No lower extremity edema; peripheral pulses are 2+ bilaterally  MUSCULOSKELETAL: no clubbing or cyanosis of digits; no joint swelling or tenderness to palpation  NEURO: nonfocal, strength grossly intact  PSYCH: A+O to person, place, and time; affect appropriate  SKIN: warm, dry        LABS:                        15.5   16.75 )-----------( [id number]      ( 14 Apr 2020 12:34 )             48.6     04-14    141  |  91<L>  |  48<H>  ----------------------------<  173<H>  5.4<H>   |  30  |  0.62    Ca    10.1      14 Apr 2020 12:34  Phos  4.2     04-14  Mg     2.3     04-14    TPro  8.0  /  Alb  2.5<L>  /  TBili  1.1  /  DBili  x   /  AST  54<H>  /  ALT  53<H>  /  AlkPhos  135<H>  04-14      CARDIAC MARKERS ( 14 Apr 2020 12:34 )  x     / x     / 149 U/L / x     / 3.6 ng/mL              RADIOLOGY & ADDITIONAL TESTS: Cecil Salgado PGY 1  Internal Medicine  [id number]      Progress Note:    Patient is a 69y old  Female who presents with a chief complaint of 69F p/w sob and fever (14 Apr 2020 11:18)      SUBJECTIVE / OVERNIGHT EVENTS: RRT yesterday for hypoxia. Pt proned with improvement. No events overnight. Pt feeling a little better this am, asking to eat/drink.     MEDICATIONS  (STANDING):  allopurinol 300 milliGRAM(s) Oral daily  dorzolamide 2%/timolol 0.5% Ophthalmic Solution 1 Drop(s) Both EYES two times a day  enoxaparin Injectable 60 milliGRAM(s) SubCutaneous every 12 hours  furosemide   Injectable 20 milliGRAM(s) IV Push daily  latanoprost 0.005% Ophthalmic Solution 1 Drop(s) Both EYES at bedtime  lisinopril 20 milliGRAM(s) Oral daily  piperacillin/tazobactam IVPB.. 3.375 Gram(s) IV Intermittent every 8 hours    MEDICATIONS  (PRN):  acetaminophen   Tablet .. 650 milliGRAM(s) Oral every 6 hours PRN Temp greater or equal to 38C (100.4F)  benzocaine 15 mG/menthol 3.6 mG (Sugar-Free) Lozenge 1 Lozenge Oral four times a day PRN Cough  benzonatate 100 milliGRAM(s) Oral three times a day PRN Cough  guaiFENesin   Syrup  (Sugar-Free) 200 milliGRAM(s) Oral four times a day PRN Cough  sodium chloride 0.65% Nasal 1 Spray(s) Both Nostrils four times a day PRN Nasal Congestion      CAPILLARY BLOOD GLUCOSE        I&O's Summary      PHYSICAL EXAM:  Vital Signs Last 24 Hrs  T(C): 36.3 (15 Apr 2020 05:29), Max: 36.6 (14 Apr 2020 14:19)  T(F): 97.4 (15 Apr 2020 05:29), Max: 97.9 (14 Apr 2020 14:19)  HR: 88 (14 Apr 2020 21:22) (88 - 92)  BP: 128/83 (15 Apr 2020 05:29) (116/72 - 160/82)  BP(mean): --  RR: 19 (15 Apr 2020 05:29) (19 - 20)  SpO2: 91% (14 Apr 2020 21:22) (91% - 92%)    GEN: NAD, proned  CV: RRR, no murmurs  Pulm: CTAB, no rhonchi  Abd: pt prone, unable to assess  Extremities: warm, well-perfused  Neuro/psych: alert, affect appropriate        LABS:                        15.5   16.75 )-----------( 424      ( 14 Apr 2020 12:34 )             48.6     04-14    141  |  91<L>  |  48<H>  ----------------------------<  173<H>  5.4<H>   |  30  |  0.62    Ca    10.1      14 Apr 2020 12:34  Phos  4.2     04-14  Mg     2.3     04-14    TPro  8.0  /  Alb  2.5<L>  /  TBili  1.1  /  DBili  x   /  AST  54<H>  /  ALT  53<H>  /  AlkPhos  135<H>  04-14      CARDIAC MARKERS ( 14 Apr 2020 12:34 )  x     / x     / 149 U/L / x     / 3.6 ng/mL

## 2020-04-16 LAB
ALBUMIN SERPL ELPH-MCNC: 1.7 G/DL — LOW (ref 3.3–5)
ALBUMIN SERPL ELPH-MCNC: 3 G/DL — LOW (ref 3.3–5)
ALP SERPL-CCNC: 143 U/L — HIGH (ref 40–120)
ALP SERPL-CCNC: 82 U/L — SIGNIFICANT CHANGE UP (ref 40–120)
ALT FLD-CCNC: 199 U/L — HIGH (ref 10–45)
ALT FLD-CCNC: 364 U/L — HIGH (ref 10–45)
ANION GAP SERPL CALC-SCNC: 20 MMOL/L — HIGH (ref 5–17)
AST SERPL-CCNC: 142 U/L — HIGH (ref 10–40)
AST SERPL-CCNC: 247 U/L — HIGH (ref 10–40)
BILIRUB SERPL-MCNC: 0.9 MG/DL — SIGNIFICANT CHANGE UP (ref 0.2–1.2)
BILIRUB SERPL-MCNC: 1.7 MG/DL — HIGH (ref 0.2–1.2)
BUN SERPL-MCNC: 46 MG/DL — HIGH (ref 7–23)
BUN SERPL-MCNC: 68 MG/DL — HIGH (ref 7–23)
CALCIUM SERPL-MCNC: 10.7 MG/DL — HIGH (ref 8.4–10.5)
CALCIUM SERPL-MCNC: 6.6 MG/DL — LOW (ref 8.4–10.5)
CHLORIDE SERPL-SCNC: 95 MMOL/L — LOW (ref 96–108)
CHLORIDE SERPL-SCNC: <70 MMOL/L — LOW (ref 96–108)
CO2 SERPL-SCNC: 21 MMOL/L — LOW (ref 22–31)
CO2 SERPL-SCNC: 34 MMOL/L — HIGH (ref 22–31)
CREAT SERPL-MCNC: 0.52 MG/DL — SIGNIFICANT CHANGE UP (ref 0.5–1.3)
CREAT SERPL-MCNC: 0.9 MG/DL — SIGNIFICANT CHANGE UP (ref 0.5–1.3)
CRP SERPL-MCNC: 4.82 MG/DL — HIGH (ref 0–0.4)
D DIMER BLD IA.RAPID-MCNC: 269 NG/ML DDU — HIGH
FERRITIN SERPL-MCNC: 2231 NG/ML — HIGH (ref 15–150)
GLUCOSE SERPL-MCNC: 1537 MG/DL — CRITICAL HIGH (ref 70–99)
GLUCOSE SERPL-MCNC: 224 MG/DL — HIGH (ref 70–99)
HCT VFR BLD CALC: 43.1 % — SIGNIFICANT CHANGE UP (ref 34.5–45)
HGB BLD-MCNC: 12.5 G/DL — SIGNIFICANT CHANGE UP (ref 11.5–15.5)
MAGNESIUM SERPL-MCNC: 1.6 MG/DL — SIGNIFICANT CHANGE UP (ref 1.6–2.6)
MAGNESIUM SERPL-MCNC: 2.7 MG/DL — HIGH (ref 1.6–2.6)
MCHC RBC-ENTMCNC: 29 GM/DL — LOW (ref 32–36)
MCHC RBC-ENTMCNC: 30.8 PG — SIGNIFICANT CHANGE UP (ref 27–34)
MCV RBC AUTO: 106.2 FL — HIGH (ref 80–100)
NRBC # BLD: 0 /100 WBCS — SIGNIFICANT CHANGE UP (ref 0–0)
PHOSPHATE SERPL-MCNC: 2.3 MG/DL — LOW (ref 2.5–4.5)
PHOSPHATE SERPL-MCNC: 3.9 MG/DL — SIGNIFICANT CHANGE UP (ref 2.5–4.5)
PLATELET # BLD AUTO: 262 K/UL — SIGNIFICANT CHANGE UP (ref 150–400)
POTASSIUM SERPL-MCNC: 2 MMOL/L — CRITICAL LOW (ref 3.5–5.3)
POTASSIUM SERPL-MCNC: 3.2 MMOL/L — LOW (ref 3.5–5.3)
POTASSIUM SERPL-SCNC: 2 MMOL/L — CRITICAL LOW (ref 3.5–5.3)
POTASSIUM SERPL-SCNC: 3.2 MMOL/L — LOW (ref 3.5–5.3)
PROT SERPL-MCNC: 6.6 G/DL — SIGNIFICANT CHANGE UP (ref 6–8.3)
PROT SERPL-MCNC: 8.1 G/DL — SIGNIFICANT CHANGE UP (ref 6–8.3)
RBC # BLD: 4.06 M/UL — SIGNIFICANT CHANGE UP (ref 3.8–5.2)
RBC # FLD: 14.9 % — HIGH (ref 10.3–14.5)
SODIUM SERPL-SCNC: 109 MMOL/L — CRITICAL LOW (ref 135–145)
SODIUM SERPL-SCNC: 149 MMOL/L — HIGH (ref 135–145)
WBC # BLD: 12.73 K/UL — HIGH (ref 3.8–10.5)
WBC # FLD AUTO: 12.73 K/UL — HIGH (ref 3.8–10.5)

## 2020-04-16 RX ORDER — POTASSIUM CHLORIDE 20 MEQ
10 PACKET (EA) ORAL ONCE
Refills: 0 | Status: COMPLETED | OUTPATIENT
Start: 2020-04-16 | End: 2020-04-16

## 2020-04-16 RX ORDER — SODIUM CHLORIDE 9 MG/ML
500 INJECTION, SOLUTION INTRAVENOUS
Refills: 0 | Status: DISCONTINUED | OUTPATIENT
Start: 2020-04-16 | End: 2020-04-17

## 2020-04-16 RX ADMIN — LISINOPRIL 20 MILLIGRAM(S): 2.5 TABLET ORAL at 06:33

## 2020-04-16 RX ADMIN — ENOXAPARIN SODIUM 60 MILLIGRAM(S): 100 INJECTION SUBCUTANEOUS at 09:07

## 2020-04-16 RX ADMIN — PIPERACILLIN AND TAZOBACTAM 25 GRAM(S): 4; .5 INJECTION, POWDER, LYOPHILIZED, FOR SOLUTION INTRAVENOUS at 09:07

## 2020-04-16 RX ADMIN — ENOXAPARIN SODIUM 60 MILLIGRAM(S): 100 INJECTION SUBCUTANEOUS at 21:50

## 2020-04-16 RX ADMIN — DORZOLAMIDE HYDROCHLORIDE TIMOLOL MALEATE 1 DROP(S): 20; 5 SOLUTION/ DROPS OPHTHALMIC at 21:50

## 2020-04-16 RX ADMIN — LATANOPROST 1 DROP(S): 0.05 SOLUTION/ DROPS OPHTHALMIC; TOPICAL at 21:51

## 2020-04-16 RX ADMIN — Medication 100 MILLIEQUIVALENT(S): at 14:29

## 2020-04-16 RX ADMIN — SODIUM CHLORIDE 50 MILLILITER(S): 9 INJECTION, SOLUTION INTRAVENOUS at 14:29

## 2020-04-16 RX ADMIN — Medication 20 MILLIGRAM(S): at 06:33

## 2020-04-16 RX ADMIN — DORZOLAMIDE HYDROCHLORIDE TIMOLOL MALEATE 1 DROP(S): 20; 5 SOLUTION/ DROPS OPHTHALMIC at 12:05

## 2020-04-16 NOTE — PROGRESS NOTE ADULT - SUBJECTIVE AND OBJECTIVE BOX
70 yo F with PMhx of HTN and prediabetes presents to the ED with complaints of fever, chills, dry cough and worsening SOB.     Overnight- No acute events overnight. Patient has been remaining prone as much as possible.  Symptomatically patient feels a little better but O2 requirements are not lessened.   Subjective- Patient still unable to wean from 02. Notes she is very thirsty and the mouth is very dry. Will give low dose hydration.  Some of the tumor markers are improving   Repeat LFTS -- if worse consider sonogram.     MEDICATIONS  (STANDING):  allopurinol 300 milliGRAM(s) Oral daily  dorzolamide 2%/timolol 0.5% Ophthalmic Solution 1 Drop(s) Both EYES two times a day  enoxaparin Injectable 40 milliGRAM(s) SubCutaneous daily  furosemide   Injectable 20 milliGRAM(s) IV Push daily  latanoprost 0.005% Ophthalmic Solution 1 Drop(s) Both EYES at bedtime  lisinopril 20 milliGRAM(s) Oral daily  piperacillin/tazobactam IVPB.. 3.375 Gram(s) IV Intermittent every 8 hours  acetaminophen   Tablet .. 650 milliGRAM(s) Oral every 6 hours PRN Temp greater or equal to 38C (100.4F)  benzocaine 15 mG/menthol 3.6 mG (Sugar-Free) Lozenge 1 Lozenge Oral four times a day PRN Cough  benzonatate 100 milliGRAM(s) Oral three times a day PRN Cough  guaiFENesin   Syrup  (Sugar-Free) 200 milliGRAM(s) Oral four times a day PRN Cough  sodium chloride 0.65% Nasal 1 Spray(s) Both Nostrils four times a day PRN Nasal Congestion           Vital Signs Last 24 Hrs  T(C): 36.2 (16 Apr 2020 14:17), Max: 36.9 (15 Apr 2020 20:59)  T(F): 97.2 (16 Apr 2020 14:17), Max: 98.5 (15 Apr 2020 20:59)  HR: 87 (16 Apr 2020 14:17) (87 - 99)  BP: 140/90 (16 Apr 2020 14:17) (128/81 - 154/80)  BP(mean): --  RR: 20 (16 Apr 2020 14:17) (20 - 22)  SpO2: 92% (16 Apr 2020 14:17) (91% - 92%)    PHYSICAL EXAM:    GENERAL: NAD, well nourished     HEAD:  Atraumatic  EYES: EOM, PERRLA, conjunctiva pink and sclera white  ENT: No tonsillar erythema, exudates, or enlargement, moist mucous membranes, good dentition, no lesions  NECK: Supple, No JVD, normal thyroid, carotids with normal upstrokes and no bruits  CHEST/LUNG: Clear to auscultation bilaterally, No rales, rhonchi, wheezing, or rubs  HEART: Regular rate and rhythm, No murmurs, rubs, or gallops  ABDOMEN: Soft, nondistended, no masses, guarding, tenderness or rebound, bowel sounds present  EXTREMITIES:  2+ Peripheral Pulses, No clubbing, cyanosis, or edema. No arthritis of shoulders, elbows, hands, hips, knees, ankles, or feet. No DJD C spine, T spine, or L/S spine  LYMPH: No lymphadenopathy noted  SKIN: No rashes or lesions  NERVOUS SYSTEM:  Alert & Oriented X3, normal cognitive function. Motor Strength 5/5 right upper and right lower.  5/5 left upper and left lower extremities, DTRs 2+ intact and symmetric            CBC Full  -  ( 16 Apr 2020 10:23 )  WBC Count : 12.73 K/uL  RBC Count : 4.06 M/uL  Hemoglobin : 12.5 g/dL  Hematocrit : 43.1 %  Platelet Count - Automated : 262 K/uL  Mean Cell Volume : 106.2 fl  Mean Cell Hemoglobin : 30.8 pg  Mean Cell Hemoglobin Concentration : 29.0 gm/dL  Auto Neutrophil # : x  Auto Lymphocyte # : x  Auto Monocyte # : x  Auto Eosinophil # : x  Auto Basophil # : x  Auto Neutrophil % : x  Auto Lymphocyte % : x  Auto Monocyte % : x  Auto Eosinophil % : x  Auto Basophil % : x    04-16    149<H>  |  95<L>  |  68<H>  ----------------------------<  224<H>  3.2<L>   |  34<H>  |  0.90    Ca    10.7<H>      16 Apr 2020 13:01  Phos  3.9     04-16  Mg     2.7     04-16    TPro  8.1  /  Alb  3.0<L>  /  TBili  1.7<H>  /  DBili  x   /  AST  247<H>  /  ALT  364<H>  /  AlkPhos  143<H>  04-16    LIVER FUNCTIONS - ( 16 Apr 2020 13:01 )  Alb: 3.0 g/dL / Pro: 8.1 g/dL / ALK PHOS: 143 U/L / ALT: 364 U/L / AST: 247 U/L / GGT: x

## 2020-04-17 LAB
ALBUMIN SERPL ELPH-MCNC: 2.8 G/DL — LOW (ref 3.3–5)
ALP SERPL-CCNC: 136 U/L — HIGH (ref 40–120)
ALT FLD-CCNC: 516 U/L — HIGH (ref 10–45)
ANION GAP SERPL CALC-SCNC: 18 MMOL/L — HIGH (ref 5–17)
AST SERPL-CCNC: 319 U/L — HIGH (ref 10–40)
BASOPHILS # BLD AUTO: 0 K/UL — SIGNIFICANT CHANGE UP (ref 0–0.2)
BASOPHILS NFR BLD AUTO: 0.4 % — SIGNIFICANT CHANGE UP (ref 0–2)
BILIRUB SERPL-MCNC: 1.4 MG/DL — HIGH (ref 0.2–1.2)
BLD GP AB SCN SERPL QL: NEGATIVE — SIGNIFICANT CHANGE UP
BUN SERPL-MCNC: 66 MG/DL — HIGH (ref 7–23)
CALCIUM SERPL-MCNC: 10.6 MG/DL — HIGH (ref 8.4–10.5)
CHLORIDE SERPL-SCNC: 99 MMOL/L — SIGNIFICANT CHANGE UP (ref 96–108)
CO2 SERPL-SCNC: 34 MMOL/L — HIGH (ref 22–31)
CREAT SERPL-MCNC: 0.71 MG/DL — SIGNIFICANT CHANGE UP (ref 0.5–1.3)
EOSINOPHIL # BLD AUTO: 0 K/UL — SIGNIFICANT CHANGE UP (ref 0–0.5)
EOSINOPHIL NFR BLD AUTO: 0.1 % — SIGNIFICANT CHANGE UP (ref 0–6)
GLUCOSE BLDC GLUCOMTR-MCNC: 199 MG/DL — HIGH (ref 70–99)
GLUCOSE SERPL-MCNC: 166 MG/DL — HIGH (ref 70–99)
HCT VFR BLD CALC: 54.7 % — HIGH (ref 34.5–45)
HGB BLD-MCNC: 16.5 G/DL — HIGH (ref 11.5–15.5)
IMM GRANULOCYTES NFR BLD AUTO: 0.5 % — SIGNIFICANT CHANGE UP (ref 0–1.5)
LYMPHOCYTES # BLD AUTO: 1.4 K/UL — SIGNIFICANT CHANGE UP (ref 1–3.3)
LYMPHOCYTES # BLD AUTO: 8 % — LOW (ref 13–44)
MAGNESIUM SERPL-MCNC: 2.8 MG/DL — HIGH (ref 1.6–2.6)
MCHC RBC-ENTMCNC: 30.2 GM/DL — LOW (ref 32–36)
MCHC RBC-ENTMCNC: 30.2 PG — SIGNIFICANT CHANGE UP (ref 27–34)
MCV RBC AUTO: 100 FL — SIGNIFICANT CHANGE UP (ref 80–100)
MONOCYTES # BLD AUTO: 0.7 K/UL — SIGNIFICANT CHANGE UP (ref 0–0.9)
MONOCYTES NFR BLD AUTO: 4 % — SIGNIFICANT CHANGE UP (ref 2–14)
NEUTROPHILS # BLD AUTO: 15.66 K/UL — HIGH (ref 1.8–7.4)
NEUTROPHILS NFR BLD AUTO: 87 % — HIGH (ref 43–77)
NRBC # BLD: 0 /100 WBCS — SIGNIFICANT CHANGE UP (ref 0–0)
PHOSPHATE SERPL-MCNC: 3.1 MG/DL — SIGNIFICANT CHANGE UP (ref 2.5–4.5)
PLAT MORPH BLD: NORMAL — SIGNIFICANT CHANGE UP
PLATELET # BLD AUTO: 220 K/UL — SIGNIFICANT CHANGE UP (ref 150–400)
POTASSIUM SERPL-MCNC: 3.7 MMOL/L — SIGNIFICANT CHANGE UP (ref 3.5–5.3)
POTASSIUM SERPL-SCNC: 3.7 MMOL/L — SIGNIFICANT CHANGE UP (ref 3.5–5.3)
PROT SERPL-MCNC: 7.7 G/DL — SIGNIFICANT CHANGE UP (ref 6–8.3)
RBC # BLD: 5.47 M/UL — HIGH (ref 3.8–5.2)
RBC # FLD: 14.1 % — SIGNIFICANT CHANGE UP (ref 10.3–14.5)
RBC BLD AUTO: SIGNIFICANT CHANGE UP
RH IG SCN BLD-IMP: POSITIVE — SIGNIFICANT CHANGE UP
SODIUM SERPL-SCNC: 151 MMOL/L — HIGH (ref 135–145)
VARIANT LYMPHS # BLD: 1 % — SIGNIFICANT CHANGE UP (ref 0–6)
WBC # BLD: 18.12 K/UL — HIGH (ref 3.8–10.5)
WBC # FLD AUTO: 18.12 K/UL — HIGH (ref 3.8–10.5)

## 2020-04-17 PROCEDURE — 71045 X-RAY EXAM CHEST 1 VIEW: CPT | Mod: 26

## 2020-04-17 RX ORDER — SODIUM CHLORIDE 9 MG/ML
500 INJECTION, SOLUTION INTRAVENOUS
Refills: 0 | Status: DISCONTINUED | OUTPATIENT
Start: 2020-04-17 | End: 2020-04-17

## 2020-04-17 RX ORDER — MEROPENEM 1 G/30ML
1000 INJECTION INTRAVENOUS EVERY 8 HOURS
Refills: 0 | Status: DISCONTINUED | OUTPATIENT
Start: 2020-04-17 | End: 2020-04-18

## 2020-04-17 RX ORDER — SODIUM CHLORIDE 9 MG/ML
1000 INJECTION, SOLUTION INTRAVENOUS
Refills: 0 | Status: DISCONTINUED | OUTPATIENT
Start: 2020-04-17 | End: 2020-04-18

## 2020-04-17 RX ORDER — VANCOMYCIN HCL 1 G
1000 VIAL (EA) INTRAVENOUS ONCE
Refills: 0 | Status: DISCONTINUED | OUTPATIENT
Start: 2020-04-17 | End: 2020-04-18

## 2020-04-17 RX ADMIN — LISINOPRIL 20 MILLIGRAM(S): 2.5 TABLET ORAL at 05:54

## 2020-04-17 RX ADMIN — DORZOLAMIDE HYDROCHLORIDE TIMOLOL MALEATE 1 DROP(S): 20; 5 SOLUTION/ DROPS OPHTHALMIC at 05:54

## 2020-04-17 RX ADMIN — SODIUM CHLORIDE 50 MILLILITER(S): 9 INJECTION, SOLUTION INTRAVENOUS at 13:14

## 2020-04-17 RX ADMIN — LATANOPROST 1 DROP(S): 0.05 SOLUTION/ DROPS OPHTHALMIC; TOPICAL at 22:19

## 2020-04-17 RX ADMIN — ENOXAPARIN SODIUM 60 MILLIGRAM(S): 100 INJECTION SUBCUTANEOUS at 22:19

## 2020-04-17 RX ADMIN — SODIUM CHLORIDE 75 MILLILITER(S): 9 INJECTION, SOLUTION INTRAVENOUS at 23:47

## 2020-04-17 RX ADMIN — ENOXAPARIN SODIUM 60 MILLIGRAM(S): 100 INJECTION SUBCUTANEOUS at 13:12

## 2020-04-17 NOTE — PROGRESS NOTE ADULT - SUBJECTIVE AND OBJECTIVE BOX
68 yo F with PMhx of HTN and prediabetes presents to the ED with complaints of fever, chills, dry cough and worsening SOB.     Overnight- No acute events overnight. Patient has been remaining prone as much as possible.  Symptomatically patient feels a little better but O2 requirements are not lessened.   Subjective- Patient still unable to wean from 02. Notes she is very thirsty and the mouth is very dry. Will give low dose hydration.  Some of the tumor markers are improving   Repeat LFTS -- if worse consider sonogram.  Patient is showing worse signs of pre renal azotemia  and will need increased fluids    Concern is that the fluids may end up in the lungs making it more difficult to breath .  Will ask for possible use of convalescent serum to try and  help with O2  saturation.    MEDICATIONS  (STANDING):  allopurinol 300 milliGRAM(s) Oral daily  dorzolamide 2%/timolol 0.5% Ophthalmic Solution 1 Drop(s) Both EYES two times a day  enoxaparin Injectable 40 milliGRAM(s) SubCutaneous daily  furosemide   Injectable 20 milliGRAM(s) IV Push daily  latanoprost 0.005% Ophthalmic Solution 1 Drop(s) Both EYES at bedtime  lisinopril 20 milliGRAM(s) Oral daily  piperacillin/tazobactam IVPB.. 3.375 Gram(s) IV Intermittent every 8 hours  acetaminophen   Tablet .. 650 milliGRAM(s) Oral every 6 hours PRN Temp greater or equal to 38C (100.4F)  benzocaine 15 mG/menthol 3.6 mG (Sugar-Free) Lozenge 1 Lozenge Oral four times a day PRN Cough  benzonatate 100 milliGRAM(s) Oral three times a day PRN Cough  guaiFENesin   Syrup  (Sugar-Free) 200 milliGRAM(s) Oral four times a day PRN Cough  sodium chloride 0.65% Nasal 1 Spray(s) Both Nostrils four times a day PRN Nasal Congestion           Vital Signs Last 24 Hrs  T(C): 36.6 (17 Apr 2020 04:44), Max: 36.6 (16 Apr 2020 22:02)  T(F): 97.8 (17 Apr 2020 04:44), Max: 97.9 (16 Apr 2020 22:02)  HR: 93 (17 Apr 2020 04:44) (87 - 97)  BP: 146/87 (17 Apr 2020 04:44) (132/86 - 146/87)  BP(mean): --  RR: 20 (17 Apr 2020 04:44) (20 - 20)  SpO2: 91% (17 Apr 2020 04:44) (91% - 93%)    PHYSICAL EXAM:    GENERAL: NAD, well nourished     HEAD:  Atraumatic  EYES: EOM, PERRLA, conjunctiva pink and sclera white  ENT: No tonsillar erythema, exudates, or enlargement, moist mucous membranes, good dentition, no lesions  NECK: Supple, No JVD, normal thyroid, carotids with normal upstrokes and no bruits  CHEST/LUNG: Clear to auscultation bilaterally, No rales, rhonchi, wheezing, or rubs  HEART: Regular rate and rhythm, No murmurs, rubs, or gallops  ABDOMEN: Soft, nondistended, no masses, guarding, tenderness or rebound, bowel sounds present  EXTREMITIES:  2+ Peripheral Pulses, No clubbing, cyanosis, or edema. No arthritis of shoulders, elbows, hands, hips, knees, ankles, or feet. No DJD C spine, T spine, or L/S spine  LYMPH: No lymphadenopathy noted  SKIN: No rashes or lesions  NERVOUS SYSTEM:  Alert & Oriented X3, normal cognitive function. Motor Strength 5/5 right upper and right lower.  5/5 left upper and left lower extremities, DTRs 2+ intact and symmetric        Vital Signs Last 24 Hrs  T(C): 36.6 (17 Apr 2020 04:44), Max: 36.6 (16 Apr 2020 22:02)  T(F): 97.8 (17 Apr 2020 04:44), Max: 97.9 (16 Apr 2020 22:02)  HR: 93 (17 Apr 2020 04:44) (87 - 97)  BP: 146/87 (17 Apr 2020 04:44) (132/86 - 146/87)  BP(mean): --  RR: 20 (17 Apr 2020 04:44) (20 - 20)  SpO2: 91% (17 Apr 2020 04:44) (91% - 93%)    CBC Full  -  ( 16 Apr 2020 10:23 )  WBC Count : 12.73 K/uL  RBC Count : 4.06 M/uL  Hemoglobin : 12.5 g/dL  Hematocrit : 43.1 %  Platelet Count - Automated : 262 K/uL  Mean Cell Volume : 106.2 fl  Mean Cell Hemoglobin : 30.8 pg  Mean Cell Hemoglobin Concentration : 29.0 gm/dL  Auto Neutrophil # : x  Auto Lymphocyte # : x  Auto Monocyte # : x  Auto Eosinophil # : x  Auto Basophil # : x  Auto Neutrophil % : x  Auto Lymphocyte % : x  Auto Monocyte % : x  Auto Eosinophil % : x  Auto Basophil % : x    04-16    149<H>  |  95<L>  |  68<H>  ----------------------------<  224<H>  3.2<L>   |  34<H>  |  0.90    Ca    10.7<H>      16 Apr 2020 13:01  Phos  3.9     04-16  Mg     2.7     04-16    TPro  8.1  /  Alb  3.0<L>  /  TBili  1.7<H>  /  DBili  x   /  AST  247<H>  /  ALT  364<H>  /  AlkPhos  143<H>  04-16    LIVER FUNCTIONS - ( 16 Apr 2020 13:01 )  Alb: 3.0 g/dL / Pro: 8.1 g/dL / ALK PHOS: 143 U/L / ALT: 364 U/L / AST: 247 U/L / GGT: x

## 2020-04-17 NOTE — RAPID RESPONSE TEAM SUMMARY - NSSITUATIONBACKGROUNDRRT_GEN_ALL_CORE
68 yo F with PMhx of HTN and prediabetes presents to the ED with complaints of fever, chills, dry cough and worsening SOB admit to medicine for acute hypoxic respiratory failure 2/2 COVID-19 pneumonia.    RRT called for hypoxia. Upon entering the room, patient in prone position, on NRB, satting low 80s. Pillow placed under patients hip and head with gradual improvement in oxygenation to 90%. Primary team at bedside to obtain cxr given persistent leukocytosis. RRT then concluded. Remainder of care as per primary team.

## 2020-04-17 NOTE — CHART NOTE - NSCHARTNOTEFT_GEN_A_CORE
Called by RN to update daughter Sienna Gibbons who is a NP at [phone]. Daughter is patient's health care proxy. Daughter was updated regarding patient's RRT this afternoon. Daughter would like patient to remain full code. Daughter would like primary team to call her during the daytime, will relay to primary team.     -JOEY Garcia, PGy1

## 2020-04-17 NOTE — PROGRESS NOTE ADULT - PROBLEM SELECTOR PLAN 1
-CRP and ferritin trending down  -s/p steroids and Kineret  -continue supportive care  Consider use of convalescent serum infusion   vigorous incentive spirometry  -s/p plaquenil and azithromycin  -O2 sats are stable in the low 90s  -encourage proning or lateral decubitus positioning

## 2020-04-18 LAB
ALBUMIN SERPL ELPH-MCNC: 2.6 G/DL — LOW (ref 3.3–5)
ALBUMIN SERPL ELPH-MCNC: 3 G/DL — LOW (ref 3.3–5)
ALP SERPL-CCNC: 117 U/L — SIGNIFICANT CHANGE UP (ref 40–120)
ALP SERPL-CCNC: 131 U/L — HIGH (ref 40–120)
ALT FLD-CCNC: 596 U/L — HIGH (ref 10–45)
ALT FLD-CCNC: 602 U/L — HIGH (ref 10–45)
ANION GAP SERPL CALC-SCNC: 15 MMOL/L — SIGNIFICANT CHANGE UP (ref 5–17)
ANION GAP SERPL CALC-SCNC: 19 MMOL/L — HIGH (ref 5–17)
AST SERPL-CCNC: 271 U/L — HIGH (ref 10–40)
AST SERPL-CCNC: 314 U/L — HIGH (ref 10–40)
BASOPHILS # BLD AUTO: 0.02 K/UL — SIGNIFICANT CHANGE UP (ref 0–0.2)
BASOPHILS NFR BLD AUTO: 0.1 % — SIGNIFICANT CHANGE UP (ref 0–2)
BILIRUB SERPL-MCNC: 1.5 MG/DL — HIGH (ref 0.2–1.2)
BILIRUB SERPL-MCNC: 1.5 MG/DL — HIGH (ref 0.2–1.2)
BLD GP AB SCN SERPL QL: NEGATIVE — SIGNIFICANT CHANGE UP
BUN SERPL-MCNC: 65 MG/DL — HIGH (ref 7–23)
BUN SERPL-MCNC: 77 MG/DL — HIGH (ref 7–23)
CALCIUM SERPL-MCNC: 10.4 MG/DL — SIGNIFICANT CHANGE UP (ref 8.4–10.5)
CALCIUM SERPL-MCNC: 10.6 MG/DL — HIGH (ref 8.4–10.5)
CHLORIDE SERPL-SCNC: 101 MMOL/L — SIGNIFICANT CHANGE UP (ref 96–108)
CHLORIDE SERPL-SCNC: 101 MMOL/L — SIGNIFICANT CHANGE UP (ref 96–108)
CO2 SERPL-SCNC: 31 MMOL/L — SIGNIFICANT CHANGE UP (ref 22–31)
CO2 SERPL-SCNC: 37 MMOL/L — HIGH (ref 22–31)
CREAT SERPL-MCNC: 0.75 MG/DL — SIGNIFICANT CHANGE UP (ref 0.5–1.3)
CREAT SERPL-MCNC: 1.21 MG/DL — SIGNIFICANT CHANGE UP (ref 0.5–1.3)
EOSINOPHIL # BLD AUTO: 0.01 K/UL — SIGNIFICANT CHANGE UP (ref 0–0.5)
EOSINOPHIL NFR BLD AUTO: 0.1 % — SIGNIFICANT CHANGE UP (ref 0–6)
GLUCOSE SERPL-MCNC: 222 MG/DL — HIGH (ref 70–99)
GLUCOSE SERPL-MCNC: 226 MG/DL — HIGH (ref 70–99)
HCT VFR BLD CALC: 50.8 % — HIGH (ref 34.5–45)
HCT VFR BLD CALC: 53.9 % — HIGH (ref 34.5–45)
HGB BLD-MCNC: 15.7 G/DL — HIGH (ref 11.5–15.5)
HGB BLD-MCNC: 16.7 G/DL — HIGH (ref 11.5–15.5)
IMM GRANULOCYTES NFR BLD AUTO: 0.6 % — SIGNIFICANT CHANGE UP (ref 0–1.5)
LYMPHOCYTES # BLD AUTO: 1.31 K/UL — SIGNIFICANT CHANGE UP (ref 1–3.3)
LYMPHOCYTES # BLD AUTO: 7.8 % — LOW (ref 13–44)
MAGNESIUM SERPL-MCNC: 2.9 MG/DL — HIGH (ref 1.6–2.6)
MCHC RBC-ENTMCNC: 30.4 PG — SIGNIFICANT CHANGE UP (ref 27–34)
MCHC RBC-ENTMCNC: 30.4 PG — SIGNIFICANT CHANGE UP (ref 27–34)
MCHC RBC-ENTMCNC: 30.9 GM/DL — LOW (ref 32–36)
MCHC RBC-ENTMCNC: 31 GM/DL — LOW (ref 32–36)
MCV RBC AUTO: 98.2 FL — SIGNIFICANT CHANGE UP (ref 80–100)
MCV RBC AUTO: 98.4 FL — SIGNIFICANT CHANGE UP (ref 80–100)
MONOCYTES # BLD AUTO: 0.65 K/UL — SIGNIFICANT CHANGE UP (ref 0–0.9)
MONOCYTES NFR BLD AUTO: 3.8 % — SIGNIFICANT CHANGE UP (ref 2–14)
NEUTROPHILS # BLD AUTO: 14.8 K/UL — HIGH (ref 1.8–7.4)
NEUTROPHILS NFR BLD AUTO: 87.6 % — HIGH (ref 43–77)
NRBC # BLD: 0 /100 WBCS — SIGNIFICANT CHANGE UP (ref 0–0)
NRBC # BLD: 0 /100 WBCS — SIGNIFICANT CHANGE UP (ref 0–0)
PHOSPHATE SERPL-MCNC: 4.6 MG/DL — HIGH (ref 2.5–4.5)
PLATELET # BLD AUTO: 311 K/UL — SIGNIFICANT CHANGE UP (ref 150–400)
PLATELET # BLD AUTO: 335 K/UL — SIGNIFICANT CHANGE UP (ref 150–400)
POTASSIUM SERPL-MCNC: 3.2 MMOL/L — LOW (ref 3.5–5.3)
POTASSIUM SERPL-MCNC: 3.7 MMOL/L — SIGNIFICANT CHANGE UP (ref 3.5–5.3)
POTASSIUM SERPL-SCNC: 3.2 MMOL/L — LOW (ref 3.5–5.3)
POTASSIUM SERPL-SCNC: 3.7 MMOL/L — SIGNIFICANT CHANGE UP (ref 3.5–5.3)
PROCALCITONIN SERPL-MCNC: 0.39 NG/ML — HIGH (ref 0.02–0.1)
PROT SERPL-MCNC: 7.3 G/DL — SIGNIFICANT CHANGE UP (ref 6–8.3)
PROT SERPL-MCNC: 7.3 G/DL — SIGNIFICANT CHANGE UP (ref 6–8.3)
RBC # BLD: 5.16 M/UL — SIGNIFICANT CHANGE UP (ref 3.8–5.2)
RBC # BLD: 5.49 M/UL — HIGH (ref 3.8–5.2)
RBC # FLD: 14 % — SIGNIFICANT CHANGE UP (ref 10.3–14.5)
RBC # FLD: 14.1 % — SIGNIFICANT CHANGE UP (ref 10.3–14.5)
RH IG SCN BLD-IMP: POSITIVE — SIGNIFICANT CHANGE UP
SODIUM SERPL-SCNC: 151 MMOL/L — HIGH (ref 135–145)
SODIUM SERPL-SCNC: 153 MMOL/L — HIGH (ref 135–145)
WBC # BLD: 15.71 K/UL — HIGH (ref 3.8–10.5)
WBC # BLD: 16.89 K/UL — HIGH (ref 3.8–10.5)
WBC # FLD AUTO: 15.71 K/UL — HIGH (ref 3.8–10.5)
WBC # FLD AUTO: 16.89 K/UL — HIGH (ref 3.8–10.5)

## 2020-04-18 RX ADMIN — DORZOLAMIDE HYDROCHLORIDE TIMOLOL MALEATE 1 DROP(S): 20; 5 SOLUTION/ DROPS OPHTHALMIC at 05:17

## 2020-04-18 RX ADMIN — LISINOPRIL 20 MILLIGRAM(S): 2.5 TABLET ORAL at 05:16

## 2020-04-18 RX ADMIN — DORZOLAMIDE HYDROCHLORIDE TIMOLOL MALEATE 1 DROP(S): 20; 5 SOLUTION/ DROPS OPHTHALMIC at 18:15

## 2020-04-18 RX ADMIN — ENOXAPARIN SODIUM 60 MILLIGRAM(S): 100 INJECTION SUBCUTANEOUS at 21:32

## 2020-04-18 RX ADMIN — ENOXAPARIN SODIUM 60 MILLIGRAM(S): 100 INJECTION SUBCUTANEOUS at 12:49

## 2020-04-18 RX ADMIN — LATANOPROST 1 DROP(S): 0.05 SOLUTION/ DROPS OPHTHALMIC; TOPICAL at 21:32

## 2020-04-18 NOTE — PROGRESS NOTE ADULT - SUBJECTIVE AND OBJECTIVE BOX
68 yo F with PMhx of HTN and prediabetes presents to the ED with complaints of fever, chills, dry cough and worsening SOB. No acute events overnight. Patient has been remaining prone as much as possible.  Symptomatically patient feels a little better but O2 requirements are not lessened. Patient still unable to wean from 02. Notes she is very thirsty and the mouth is very dry. Will give low dose hydration.Patient is showing worse signs of pre renal azotemia  and will need increased fluids Concern is that the fluids may end up in the lungs making it more difficult to breath .  Will ask for possible use of convalescent serum to try and  help with O2  saturation.      MEDICATIONS  (STANDING):  allopurinol 300 milliGRAM(s) Oral daily  dorzolamide 2%/timolol 0.5% Ophthalmic Solution 1 Drop(s) Both EYES two times a day  enoxaparin Injectable 60 milliGRAM(s) SubCutaneous every 12 hours  latanoprost 0.005% Ophthalmic Solution 1 Drop(s) Both EYES at bedtime  lisinopril 20 milliGRAM(s) Oral daily    MEDICATIONS  (PRN):  acetaminophen   Tablet .. 650 milliGRAM(s) Oral every 6 hours PRN Temp greater or equal to 38C (100.4F)  benzocaine 15 mG/menthol 3.6 mG (Sugar-Free) Lozenge 1 Lozenge Oral four times a day PRN Cough  benzonatate 100 milliGRAM(s) Oral three times a day PRN Cough  guaiFENesin   Syrup  (Sugar-Free) 200 milliGRAM(s) Oral four times a day PRN Cough  sodium chloride 0.65% Nasal 1 Spray(s) Both Nostrils four times a day PRN Nasal Congestion      PHYSICAL EXAM:    GENERAL: NAD, well nourished     HEAD:  Atraumatic  EYES: EOM, PERRLA, conjunctiva pink and sclera white  ENT: No tonsillar erythema, exudates, or enlargement, moist mucous membranes, good dentition, no lesions  NECK: Supple, No JVD, normal thyroid, carotids with normal upstrokes and no bruits  CHEST/LUNG: Clear to auscultation bilaterally, No rales, rhonchi, wheezing, or rubs  HEART: Regular rate and rhythm, No murmurs, rubs, or gallops  ABDOMEN: Soft, nondistended, no masses, guarding, tenderness or rebound, bowel sounds present  EXTREMITIES:  2+ Peripheral Pulses, No clubbing, cyanosis, or edema. No arthritis of shoulders, elbows, hands, hips, knees, ankles, or feet. No DJD C spine, T spine, or L/S spine  LYMPH: No lymphadenopathy noted  SKIN: No rashes or lesions  NERVOUS SYSTEM:  Alert & Oriented X3, normal cognitive function. Motor Strength 5/5 right upper and right lower.  5/5 left upper and left lower extremities, DTRs 2+ intact and symmetric    VITALS:   T(C): 36.4 (04-18-20 @ 21:37), Max: 36.4 (04-18-20 @ 04:15)  HR: 93 (04-18-20 @ 21:37) (93 - 101)  BP: 119/79 (04-18-20 @ 21:37) (103/63 - 152/102)  RR: 18 (04-18-20 @ 21:37) (18 - 19)  SpO2: 100% (04-18-20 @ 21:37) (93% - 100%)  Wt(kg): --        LABS:        CBC Full  -  ( 18 Apr 2020 10:13 )  WBC Count : 16.89 K/uL  RBC Count : 5.49 M/uL  Hemoglobin : 16.7 g/dL  Hematocrit : 53.9 %  Platelet Count - Automated : 311 K/uL  Mean Cell Volume : 98.2 fl  Mean Cell Hemoglobin : 30.4 pg  Mean Cell Hemoglobin Concentration : 31.0 gm/dL  Auto Neutrophil # : 14.80 K/uL  Auto Lymphocyte # : 1.31 K/uL  Auto Monocyte # : 0.65 K/uL  Auto Eosinophil # : 0.01 K/uL  Auto Basophil # : 0.02 K/uL  Auto Neutrophil % : 87.6 %  Auto Lymphocyte % : 7.8 %  Auto Monocyte % : 3.8 %  Auto Eosinophil % : 0.1 %  Auto Basophil % : 0.1 %    04-18    153<H>  |  101  |  77<H>  ----------------------------<  226<H>  3.2<L>   |  37<H>  |  1.21    Ca    10.4      18 Apr 2020 10:13  Phos  4.6     04-18  Mg     2.9     04-18    TPro  7.3  /  Alb  3.0<L>  /  TBili  1.5<H>  /  DBili  x   /  AST  271<H>  /  ALT  596<H>  /  AlkPhos  117  04-18    LIVER FUNCTIONS - ( 18 Apr 2020 10:13 )  Alb: 3.0 g/dL / Pro: 7.3 g/dL / ALK PHOS: 117 U/L / ALT: 596 U/L / AST: 271 U/L / GGT: x               CAPILLARY BLOOD GLUCOSE          RADIOLOGY & ADDITIONAL TESTS:

## 2020-04-18 NOTE — CHART NOTE - NSCHARTNOTEFT_GEN_A_CORE
The trial, [id number]: Expanded Access to Convalescent Plasma for the Treatment of Patients with COVID-19, was discussed with LAR, Mtann marieJoanie (daughter) on 04/18/2020. Consent was witnessed by Bhumika Welsh, Research Coordinator.     During the consent process, the following was discussed:     •	The fact that the study involves research  •	The study schedule and procedures involved  •	The main risks of the study, and the fact that all risks may not be known at this time  •	New information that may affect the subject’s willingness to continue on the study will be presented as soon as it is available  •	Benefits of participating  •	Alternatives to participating  •	Confidentiality   •	Compensation for research-related injury  •	Contacts for questions about the study or their rights while on the study  •	The fact that the subject’s participation is voluntary – they can refuse or withdraw at any time without penalty or loss of benefits.    The LAR was given ample time to ask questions. All questions were answered to their satisfaction.    Coordinator sent consent via email to the daughter, the daughter responded to confirm receipt and confirmed the consent for her mother. She scanned the signed consent back to the coordinator. I signed as the consenting professional and a fully executed consent was sent back to the LAR via email.     Informed consent was obtained prior to any study procedures being performed. A copy of the signed consent and supplemental documentation was given to the subject.    Eligibility Criteria:   Patient has laboratory confirmed diagnosis of infection with SARS-CoV-2 on 03/31/2020 and admitted to an acute care facility for treatment of COVID-19 complications.     Patient has severe COVID-19 with the following symptoms:   - Dyspnea  - Blood oxygen saturation = 93%    Patient had the following medication administered prior to enrollment:   - Azithromycin  -Antibiotic therapy  -Steroids  - Hydroxychloroquine     ABO was performed on 04/18/2020 and patient blood type confirmed as A+     I have confirmed all eligibility are met for this patient to participate.     Convalescent plasma order has been completed, pending plasma from blood bank.

## 2020-04-18 NOTE — ADVANCED PRACTICE NURSE CONSULT - ASSESSMENT
Called and spoke to Daughter Sienna/LAR/Proxy regarding the Convalescent Plasma trial as the patient is very SOB. The  basis of the study was discussed and it was explained to the patients LAR. She has expressed an interest to move forward with the process. She has informed me that she has already discussed wht trial with her Mom (LAR is a NP) and that there will be a consent form that Dr. Lazar who will give to her today to be signed to move forward.

## 2020-04-18 NOTE — PROGRESS NOTE ADULT - PROBLEM SELECTOR PLAN 1
-CRP and ferritin trending down  -s/p steroids and Kineret  -continue supportive care  Consider use of convalescent serum infusion   vigorous incentive spirometry  -s/p plaquenil and azithromycin  -O2 sats are stable in the low 90s  -encourage proning or lateral decubitus positioning Star Wedge Flap Text: The defect edges were debeveled with a #15 scalpel blade.  Given the location of the defect, shape of the defect and the proximity to free margins a star wedge flap was deemed most appropriate.  Using a sterile surgical marker, an appropriate rotation flap was drawn incorporating the defect and placing the expected incisions within the relaxed skin tension lines where possible. The area thus outlined was incised deep to adipose tissue with a #15 scalpel blade.  The skin margins were undermined to an appropriate distance in all directions utilizing iris scissors.

## 2020-04-19 DIAGNOSIS — N17.9 ACUTE KIDNEY FAILURE, UNSPECIFIED: ICD-10-CM

## 2020-04-19 PROBLEM — Z79.890 ON HORMONE REPLACEMENT THERAPY: Status: ACTIVE | Noted: 2020-04-19

## 2020-04-19 LAB
ALBUMIN SERPL ELPH-MCNC: 3.1 G/DL — LOW (ref 3.3–5)
ALP SERPL-CCNC: 138 U/L — HIGH (ref 40–120)
ALT FLD-CCNC: 604 U/L — HIGH (ref 10–45)
ANION GAP SERPL CALC-SCNC: 19 MMOL/L — HIGH (ref 5–17)
AST SERPL-CCNC: 241 U/L — HIGH (ref 10–40)
BILIRUB SERPL-MCNC: 1.4 MG/DL — HIGH (ref 0.2–1.2)
BUN SERPL-MCNC: 92 MG/DL — HIGH (ref 7–23)
CALCIUM SERPL-MCNC: 10.7 MG/DL — HIGH (ref 8.4–10.5)
CHLORIDE SERPL-SCNC: 106 MMOL/L — SIGNIFICANT CHANGE UP (ref 96–108)
CO2 SERPL-SCNC: 33 MMOL/L — HIGH (ref 22–31)
CREAT SERPL-MCNC: 1.33 MG/DL — HIGH (ref 0.5–1.3)
CRP SERPL-MCNC: 3.8 MG/DL — HIGH (ref 0–0.4)
D DIMER BLD IA.RAPID-MCNC: 206 NG/ML DDU — SIGNIFICANT CHANGE UP
GLUCOSE SERPL-MCNC: 246 MG/DL — HIGH (ref 70–99)
HCT VFR BLD CALC: 55.5 % — HIGH (ref 34.5–45)
HGB BLD-MCNC: 16.9 G/DL — HIGH (ref 11.5–15.5)
MCHC RBC-ENTMCNC: 30.2 PG — SIGNIFICANT CHANGE UP (ref 27–34)
MCHC RBC-ENTMCNC: 30.5 GM/DL — LOW (ref 32–36)
MCV RBC AUTO: 99.1 FL — SIGNIFICANT CHANGE UP (ref 80–100)
NRBC # BLD: 0 /100 WBCS — SIGNIFICANT CHANGE UP (ref 0–0)
PLATELET # BLD AUTO: 271 K/UL — SIGNIFICANT CHANGE UP (ref 150–400)
POTASSIUM SERPL-MCNC: 4.4 MMOL/L — SIGNIFICANT CHANGE UP (ref 3.5–5.3)
POTASSIUM SERPL-SCNC: 4.4 MMOL/L — SIGNIFICANT CHANGE UP (ref 3.5–5.3)
PROT SERPL-MCNC: 7.4 G/DL — SIGNIFICANT CHANGE UP (ref 6–8.3)
RBC # BLD: 5.6 M/UL — HIGH (ref 3.8–5.2)
RBC # FLD: 14.4 % — SIGNIFICANT CHANGE UP (ref 10.3–14.5)
SODIUM SERPL-SCNC: 158 MMOL/L — HIGH (ref 135–145)
WBC # BLD: 16.41 K/UL — HIGH (ref 3.8–10.5)
WBC # FLD AUTO: 16.41 K/UL — HIGH (ref 3.8–10.5)

## 2020-04-19 RX ORDER — DIPHENHYDRAMINE HCL 50 MG
50 CAPSULE ORAL THREE TIMES A DAY
Refills: 0 | Status: DISCONTINUED | OUTPATIENT
Start: 2020-04-19 | End: 2020-04-21

## 2020-04-19 RX ORDER — SODIUM CHLORIDE 9 MG/ML
500 INJECTION, SOLUTION INTRAVENOUS
Refills: 0 | Status: DISCONTINUED | OUTPATIENT
Start: 2020-04-19 | End: 2020-04-19

## 2020-04-19 RX ORDER — SODIUM CHLORIDE 9 MG/ML
1000 INJECTION, SOLUTION INTRAVENOUS
Refills: 0 | Status: DISCONTINUED | OUTPATIENT
Start: 2020-04-19 | End: 2020-04-21

## 2020-04-19 RX ORDER — FAMOTIDINE 10 MG/ML
20 INJECTION INTRAVENOUS ONCE
Refills: 0 | Status: DISCONTINUED | OUTPATIENT
Start: 2020-04-19 | End: 2020-04-23

## 2020-04-19 RX ORDER — POTASSIUM CHLORIDE 20 MEQ
10 PACKET (EA) ORAL
Refills: 0 | Status: COMPLETED | OUTPATIENT
Start: 2020-04-19 | End: 2020-04-19

## 2020-04-19 RX ORDER — OXYMETAZOLINE HYDROCHLORIDE 0.5 MG/ML
2 SPRAY NASAL
Refills: 0 | Status: DISCONTINUED | OUTPATIENT
Start: 2020-04-19 | End: 2020-04-21

## 2020-04-19 RX ADMIN — DORZOLAMIDE HYDROCHLORIDE TIMOLOL MALEATE 1 DROP(S): 20; 5 SOLUTION/ DROPS OPHTHALMIC at 17:48

## 2020-04-19 RX ADMIN — Medication 100 MILLIEQUIVALENT(S): at 10:45

## 2020-04-19 RX ADMIN — ENOXAPARIN SODIUM 60 MILLIGRAM(S): 100 INJECTION SUBCUTANEOUS at 20:58

## 2020-04-19 RX ADMIN — SODIUM CHLORIDE 75 MILLILITER(S): 9 INJECTION, SOLUTION INTRAVENOUS at 17:49

## 2020-04-19 RX ADMIN — LISINOPRIL 20 MILLIGRAM(S): 2.5 TABLET ORAL at 05:12

## 2020-04-19 RX ADMIN — Medication 100 MILLIEQUIVALENT(S): at 09:56

## 2020-04-19 RX ADMIN — Medication 100 MILLIEQUIVALENT(S): at 13:56

## 2020-04-19 RX ADMIN — Medication 300 MILLIGRAM(S): at 10:41

## 2020-04-19 RX ADMIN — ENOXAPARIN SODIUM 60 MILLIGRAM(S): 100 INJECTION SUBCUTANEOUS at 09:01

## 2020-04-19 RX ADMIN — DORZOLAMIDE HYDROCHLORIDE TIMOLOL MALEATE 1 DROP(S): 20; 5 SOLUTION/ DROPS OPHTHALMIC at 05:12

## 2020-04-19 RX ADMIN — SODIUM CHLORIDE 50 MILLILITER(S): 9 INJECTION, SOLUTION INTRAVENOUS at 15:40

## 2020-04-19 RX ADMIN — OXYMETAZOLINE HYDROCHLORIDE 2 SPRAY(S): 0.5 SPRAY NASAL at 20:58

## 2020-04-19 NOTE — PROGRESS NOTE ADULT - PROBLEM SELECTOR PLAN 1
-s/p steroids and Kineret  -continue supportive care  received convalescent serum infusion   vigorous incentive spirometry  -s/p plaquenil and azithromycin  -O2 sats are slightly improved today  -encourage proning or lateral decubitus positioning

## 2020-04-19 NOTE — PROGRESS NOTE ADULT - PROBLEM SELECTOR PLAN 1
-Patient currently on reconvalescent plasma trial. s/p first infusion today without reaction. Tolerated well   -CRP and ferritin trending down  -s/p steroids and Kineret  -continue supportive care  Consider use of convalescent serum infusion   vigorous incentive spirometry  -s/p plaquenil and azithromycin  -O2 sats are stable in the low 90s  -encourage proning or lateral decubitus positioning

## 2020-04-19 NOTE — PROGRESS NOTE ADULT - SUBJECTIVE AND OBJECTIVE BOX
70 yo F with PMhx of HTN and prediabetes presents to the ED with complaints of fever, chills, dry cough and worsening SOB.     No acute events overnight. Patient has been remaining prone as much as possible. She notes nose bleeding. No pain. Wants to try to eat something today.     MEDICATIONS  (STANDING):  allopurinol 300 milliGRAM(s) Oral daily  dorzolamide 2%/timolol 0.5% Ophthalmic Solution 1 Drop(s) Both EYES two times a day  enoxaparin Injectable 60 milliGRAM(s) SubCutaneous every 12 hours  latanoprost 0.005% Ophthalmic Solution 1 Drop(s) Both EYES at bedtime  lisinopril 20 milliGRAM(s) Oral daily    MEDICATIONS  (PRN):  acetaminophen   Tablet .. 650 milliGRAM(s) Oral every 6 hours PRN Temp greater or equal to 38C (100.4F)  benzocaine 15 mG/menthol 3.6 mG (Sugar-Free) Lozenge 1 Lozenge Oral four times a day PRN Cough  benzonatate 100 milliGRAM(s) Oral three times a day PRN Cough  guaiFENesin   Syrup  (Sugar-Free) 200 milliGRAM(s) Oral four times a day PRN Cough  sodium chloride 0.65% Nasal 1 Spray(s) Both Nostrils four times a day PRN Nasal Congestion      PHYSICAL EXAM:    GENERAL: patient proned, appears comfortable   HEAD:  Atraumatic  EYES: EOM  ENT: bleeding noted from the nose   CHEST/LUNG: Clear to auscultation bilaterally, No rales, rhonchi, wheezing, or rubs  HEART: Regular rate and rhythm, No murmurs, rubs, or gallops  ABDOMEN: Soft, nondistended  EXTREMITIES:  2+ Peripheral Pulses  SKIN: No rashes or lesions  NERVOUS SYSTEM:  Alert & Oriented X3, normal cognitive function.     VITALS:   T(C): 36.4 (04-18-20 @ 21:37), Max: 36.4 (04-18-20 @ 04:15)  HR: 93 (04-18-20 @ 21:37) (93 - 101)  BP: 119/79 (04-18-20 @ 21:37) (103/63 - 152/102)  RR: 18 (04-18-20 @ 21:37) (18 - 19)  SpO2: 100% (04-18-20 @ 21:37) (93% - 100%)  Wt(kg): --        LABS:        CBC Full  -  ( 18 Apr 2020 10:13 )  WBC Count : 16.89 K/uL  RBC Count : 5.49 M/uL  Hemoglobin : 16.7 g/dL  Hematocrit : 53.9 %  Platelet Count - Automated : 311 K/uL  Mean Cell Volume : 98.2 fl  Mean Cell Hemoglobin : 30.4 pg  Mean Cell Hemoglobin Concentration : 31.0 gm/dL  Auto Neutrophil # : 14.80 K/uL  Auto Lymphocyte # : 1.31 K/uL  Auto Monocyte # : 0.65 K/uL  Auto Eosinophil # : 0.01 K/uL  Auto Basophil # : 0.02 K/uL  Auto Neutrophil % : 87.6 %  Auto Lymphocyte % : 7.8 %  Auto Monocyte % : 3.8 %  Auto Eosinophil % : 0.1 %  Auto Basophil % : 0.1 %    04-18    153<H>  |  101  |  77<H>  ----------------------------<  226<H>  3.2<L>   |  37<H>  |  1.21    Ca    10.4      18 Apr 2020 10:13  Phos  4.6     04-18  Mg     2.9     04-18    TPro  7.3  /  Alb  3.0<L>  /  TBili  1.5<H>  /  DBili  x   /  AST  271<H>  /  ALT  596<H>  /  AlkPhos  117  04-18    LIVER FUNCTIONS - ( 18 Apr 2020 10:13 )  Alb: 3.0 g/dL / Pro: 7.3 g/dL / ALK PHOS: 117 U/L / ALT: 596 U/L / AST: 271 U/L / GGT: x               CAPILLARY BLOOD GLUCOSE          RADIOLOGY & ADDITIONAL TESTS:

## 2020-04-19 NOTE — PROVIDER CONTACT NOTE (OTHER) - SITUATION
Patient meets criteria for plasma trial. Consent in E-Chart. Plasma ordered from blood bank & administered  to patient per protocol.

## 2020-04-19 NOTE — PROVIDER CONTACT NOTE (OTHER) - ASSESSMENT
A/O3; No resp distress or pain noted throughout. Pt in prone position on NRB 15L. afebrile. No SOB noted. VS WDL pre & post transfusion. No reaction or changes noted. See Flowsheet.

## 2020-04-19 NOTE — PROGRESS NOTE ADULT - SUBJECTIVE AND OBJECTIVE BOX
70 yo F with PMhx of HTN and prediabetes presents to the ED with complaints of fever, chills, dry cough and worsening SOB. No acute events overnight. Patient has been remaining prone as much as possible.  Symptomatically patient feels a little better but O2 requirements are not lessened. Patient still unable to wean from 02. Notes she is very thirsty and the mouth is very dry. Will give low dose hydration.Patient is showing worse signs of pre renal azotemia  and will need increased fluids Concern is that the fluids may end up in the lungs making it more difficult to breath .  Will ask for possible use of convalescent serum to try and  help with O2  saturation.        MEDICATIONS  (STANDING):  allopurinol 300 milliGRAM(s) Oral daily  dextrose 5% + sodium chloride 0.45%. 1000 milliLiter(s) (75 mL/Hr) IV Continuous <Continuous>  dextrose 5%. 500 milliLiter(s) (50 mL/Hr) IV Continuous <Continuous>  dorzolamide 2%/timolol 0.5% Ophthalmic Solution 1 Drop(s) Both EYES two times a day  enoxaparin Injectable 60 milliGRAM(s) SubCutaneous every 12 hours  latanoprost 0.005% Ophthalmic Solution 1 Drop(s) Both EYES at bedtime  lisinopril 20 milliGRAM(s) Oral daily  oxymetazoline 0.05% Nasal Spray 2 Spray(s) Both Nostrils two times a day    MEDICATIONS  (PRN):  acetaminophen   Tablet .. 650 milliGRAM(s) Oral every 6 hours PRN Temp greater or equal to 38C (100.4F)  benzocaine 15 mG/menthol 3.6 mG (Sugar-Free) Lozenge 1 Lozenge Oral four times a day PRN Cough  benzonatate 100 milliGRAM(s) Oral three times a day PRN Cough  diphenhydrAMINE   Injectable 50 milliGRAM(s) IV Push three times a day PRN Allergy symptoms  famotidine Injectable 20 milliGRAM(s) IV Push once PRN Infusion reaction  guaiFENesin   Syrup  (Sugar-Free) 200 milliGRAM(s) Oral four times a day PRN Cough  sodium chloride 0.65% Nasal 1 Spray(s) Both Nostrils four times a day PRN Nasal Congestion          VITALS:   T(C): 36.4 (04-19-20 @ 14:15), Max: 36.8 (04-19-20 @ 12:39)  HR: 111 (04-19-20 @ 14:15) (92 - 114)  BP: 117/77 (04-19-20 @ 14:15) (92/63 - 119/79)  RR: 20 (04-19-20 @ 14:15) (18 - 20)  SpO2: 96% (04-19-20 @ 14:15) (93% - 100%)  Wt(kg): --    PHYSICAL EXAM:    GENERAL: NAD, well nourished     HEAD:  Atraumatic  EYES: EOM, PERRLA, conjunctiva pink and sclera white  ENT: + epistaxis   NECK: Supple, No JVD, normal thyroid, carotids with normal upstrokes and no bruits  CHEST/LUNG: Clear to auscultation bilaterally, No rales, rhonchi, wheezing, or rubs  HEART: Regular rate and rhythm, No murmurs, rubs, or gallops  ABDOMEN: Soft, nondistended, no masses, guarding, tenderness or rebound, bowel sounds present  EXTREMITIES:  2+ Peripheral Pulses, No clubbing, cyanosis, or edema. No arthritis of shoulders, elbows, hands, hips, knees, ankles, or feet. No DJD C spine, T spine, or L/S spine  LYMPH: No lymphadenopathy noted  SKIN: No rashes or lesions  NERVOUS SYSTEM:  Alert & Oriented X3, normal cognitive function. Motor Strength 5/5 right upper and right lower.  5/5 left upper and left lower extremities, DTRs 2+ intact and symmetric    LABS:        CBC Full  -  ( 19 Apr 2020 12:09 )  WBC Count : 16.41 K/uL  RBC Count : 5.60 M/uL  Hemoglobin : 16.9 g/dL  Hematocrit : 55.5 %  Platelet Count - Automated : 271 K/uL  Mean Cell Volume : 99.1 fl  Mean Cell Hemoglobin : 30.2 pg  Mean Cell Hemoglobin Concentration : 30.5 gm/dL  Auto Neutrophil # : x  Auto Lymphocyte # : x  Auto Monocyte # : x  Auto Eosinophil # : x  Auto Basophil # : x  Auto Neutrophil % : x  Auto Lymphocyte % : x  Auto Monocyte % : x  Auto Eosinophil % : x  Auto Basophil % : x    04-19    158<H>  |  106  |  92<H>  ----------------------------<  246<H>  4.4   |  33<H>  |  1.33<H>    Ca    10.7<H>      19 Apr 2020 12:09  Phos  4.6     04-18  Mg     2.9     04-18    TPro  7.4  /  Alb  3.1<L>  /  TBili  1.4<H>  /  DBili  x   /  AST  241<H>  /  ALT  604<H>  /  AlkPhos  138<H>  04-19    LIVER FUNCTIONS - ( 19 Apr 2020 12:09 )  Alb: 3.1 g/dL / Pro: 7.4 g/dL / ALK PHOS: 138 U/L / ALT: 604 U/L / AST: 241 U/L / GGT: x               CAPILLARY BLOOD GLUCOSE          RADIOLOGY & ADDITIONAL TESTS:

## 2020-04-19 NOTE — PROVIDER CONTACT NOTE (OTHER) - ACTION/TREATMENT ORDERED:
MD aware of situation. No further interventions at this time. Pt received plasma trial. Will monitor pt closely.

## 2020-04-20 DIAGNOSIS — E87.0 HYPEROSMOLALITY AND HYPERNATREMIA: ICD-10-CM

## 2020-04-20 DIAGNOSIS — R73.9 HYPERGLYCEMIA, UNSPECIFIED: ICD-10-CM

## 2020-04-20 DIAGNOSIS — N17.9 ACUTE KIDNEY FAILURE, UNSPECIFIED: ICD-10-CM

## 2020-04-20 LAB
ALBUMIN SERPL ELPH-MCNC: 2.7 G/DL — LOW (ref 3.3–5)
ALBUMIN SERPL ELPH-MCNC: 3 G/DL — LOW (ref 3.3–5)
ALP SERPL-CCNC: 122 U/L — HIGH (ref 40–120)
ALP SERPL-CCNC: 134 U/L — HIGH (ref 40–120)
ALT FLD-CCNC: 380 U/L — HIGH (ref 10–45)
ALT FLD-CCNC: 443 U/L — HIGH (ref 10–45)
ANION GAP SERPL CALC-SCNC: 17 MMOL/L — SIGNIFICANT CHANGE UP (ref 5–17)
ANION GAP SERPL CALC-SCNC: 20 MMOL/L — HIGH (ref 5–17)
AST SERPL-CCNC: 155 U/L — HIGH (ref 10–40)
AST SERPL-CCNC: 162 U/L — HIGH (ref 10–40)
BILIRUB SERPL-MCNC: 0.8 MG/DL — SIGNIFICANT CHANGE UP (ref 0.2–1.2)
BILIRUB SERPL-MCNC: 1.2 MG/DL — SIGNIFICANT CHANGE UP (ref 0.2–1.2)
BUN SERPL-MCNC: 87 MG/DL — HIGH (ref 7–23)
BUN SERPL-MCNC: 91 MG/DL — HIGH (ref 7–23)
CALCIUM SERPL-MCNC: 10.3 MG/DL — SIGNIFICANT CHANGE UP (ref 8.4–10.5)
CALCIUM SERPL-MCNC: 10.5 MG/DL — SIGNIFICANT CHANGE UP (ref 8.4–10.5)
CHLORIDE SERPL-SCNC: 104 MMOL/L — SIGNIFICANT CHANGE UP (ref 96–108)
CHLORIDE SERPL-SCNC: 109 MMOL/L — HIGH (ref 96–108)
CO2 SERPL-SCNC: 27 MMOL/L — SIGNIFICANT CHANGE UP (ref 22–31)
CO2 SERPL-SCNC: 33 MMOL/L — HIGH (ref 22–31)
CREAT SERPL-MCNC: 1.12 MG/DL — SIGNIFICANT CHANGE UP (ref 0.5–1.3)
CREAT SERPL-MCNC: 1.28 MG/DL — SIGNIFICANT CHANGE UP (ref 0.5–1.3)
CRP SERPL-MCNC: 5.57 MG/DL — HIGH (ref 0–0.4)
D DIMER BLD IA.RAPID-MCNC: 252 NG/ML DDU — HIGH
FERRITIN SERPL-MCNC: 3016 NG/ML — HIGH (ref 15–150)
FERRITIN SERPL-MCNC: 3479 NG/ML — HIGH (ref 15–150)
GAS PNL BLDA: SIGNIFICANT CHANGE UP
GLUCOSE BLDC GLUCOMTR-MCNC: 194 MG/DL — HIGH (ref 70–99)
GLUCOSE BLDC GLUCOMTR-MCNC: 202 MG/DL — HIGH (ref 70–99)
GLUCOSE SERPL-MCNC: 252 MG/DL — HIGH (ref 70–99)
GLUCOSE SERPL-MCNC: 354 MG/DL — HIGH (ref 70–99)
HCT VFR BLD CALC: 55.3 % — HIGH (ref 34.5–45)
HCT VFR BLD CALC: 55.5 % — HIGH (ref 34.5–45)
HGB BLD-MCNC: 16.6 G/DL — HIGH (ref 11.5–15.5)
HGB BLD-MCNC: 16.7 G/DL — HIGH (ref 11.5–15.5)
MAGNESIUM SERPL-MCNC: 2.8 MG/DL — HIGH (ref 1.6–2.6)
MCHC RBC-ENTMCNC: 30 GM/DL — LOW (ref 32–36)
MCHC RBC-ENTMCNC: 30 PG — SIGNIFICANT CHANGE UP (ref 27–34)
MCHC RBC-ENTMCNC: 30.1 GM/DL — LOW (ref 32–36)
MCHC RBC-ENTMCNC: 30.2 PG — SIGNIFICANT CHANGE UP (ref 27–34)
MCV RBC AUTO: 100.7 FL — HIGH (ref 80–100)
MCV RBC AUTO: 99.6 FL — SIGNIFICANT CHANGE UP (ref 80–100)
NRBC # BLD: 0 /100 WBCS — SIGNIFICANT CHANGE UP (ref 0–0)
NRBC # BLD: 0 /100 WBCS — SIGNIFICANT CHANGE UP (ref 0–0)
PHOSPHATE SERPL-MCNC: 3.6 MG/DL — SIGNIFICANT CHANGE UP (ref 2.5–4.5)
PLATELET # BLD AUTO: 239 K/UL — SIGNIFICANT CHANGE UP (ref 150–400)
PLATELET # BLD AUTO: 267 K/UL — SIGNIFICANT CHANGE UP (ref 150–400)
POTASSIUM SERPL-MCNC: 3.9 MMOL/L — SIGNIFICANT CHANGE UP (ref 3.5–5.3)
POTASSIUM SERPL-MCNC: 7.1 MMOL/L — CRITICAL HIGH (ref 3.5–5.3)
POTASSIUM SERPL-SCNC: 3.9 MMOL/L — SIGNIFICANT CHANGE UP (ref 3.5–5.3)
POTASSIUM SERPL-SCNC: 7.1 MMOL/L — CRITICAL HIGH (ref 3.5–5.3)
PROT SERPL-MCNC: 7.7 G/DL — SIGNIFICANT CHANGE UP (ref 6–8.3)
PROT SERPL-MCNC: 7.7 G/DL — SIGNIFICANT CHANGE UP (ref 6–8.3)
RBC # BLD: 5.49 M/UL — HIGH (ref 3.8–5.2)
RBC # BLD: 5.57 M/UL — HIGH (ref 3.8–5.2)
RBC # FLD: 14.4 % — SIGNIFICANT CHANGE UP (ref 10.3–14.5)
RBC # FLD: 14.6 % — HIGH (ref 10.3–14.5)
SODIUM SERPL-SCNC: 151 MMOL/L — HIGH (ref 135–145)
SODIUM SERPL-SCNC: 159 MMOL/L — HIGH (ref 135–145)
WBC # BLD: 17.15 K/UL — HIGH (ref 3.8–10.5)
WBC # BLD: 20.89 K/UL — HIGH (ref 3.8–10.5)
WBC # FLD AUTO: 17.15 K/UL — HIGH (ref 3.8–10.5)
WBC # FLD AUTO: 20.89 K/UL — HIGH (ref 3.8–10.5)

## 2020-04-20 PROCEDURE — 70450 CT HEAD/BRAIN W/O DYE: CPT | Mod: 26

## 2020-04-20 RX ORDER — HYDROMORPHONE HYDROCHLORIDE 2 MG/ML
0.25 INJECTION INTRAMUSCULAR; INTRAVENOUS; SUBCUTANEOUS EVERY 8 HOURS
Refills: 0 | Status: DISCONTINUED | OUTPATIENT
Start: 2020-04-20 | End: 2020-04-22

## 2020-04-20 RX ORDER — SODIUM CHLORIDE 9 MG/ML
1000 INJECTION, SOLUTION INTRAVENOUS
Refills: 0 | Status: DISCONTINUED | OUTPATIENT
Start: 2020-04-20 | End: 2020-04-21

## 2020-04-20 RX ADMIN — LISINOPRIL 20 MILLIGRAM(S): 2.5 TABLET ORAL at 04:56

## 2020-04-20 RX ADMIN — OXYMETAZOLINE HYDROCHLORIDE 2 SPRAY(S): 0.5 SPRAY NASAL at 04:56

## 2020-04-20 RX ADMIN — ENOXAPARIN SODIUM 60 MILLIGRAM(S): 100 INJECTION SUBCUTANEOUS at 10:05

## 2020-04-20 RX ADMIN — LATANOPROST 1 DROP(S): 0.05 SOLUTION/ DROPS OPHTHALMIC; TOPICAL at 21:18

## 2020-04-20 RX ADMIN — OXYMETAZOLINE HYDROCHLORIDE 2 SPRAY(S): 0.5 SPRAY NASAL at 16:07

## 2020-04-20 RX ADMIN — ENOXAPARIN SODIUM 60 MILLIGRAM(S): 100 INJECTION SUBCUTANEOUS at 21:17

## 2020-04-20 RX ADMIN — HYDROMORPHONE HYDROCHLORIDE 0.25 MILLIGRAM(S): 2 INJECTION INTRAMUSCULAR; INTRAVENOUS; SUBCUTANEOUS at 12:21

## 2020-04-20 RX ADMIN — SODIUM CHLORIDE 50 MILLILITER(S): 9 INJECTION, SOLUTION INTRAVENOUS at 12:20

## 2020-04-20 NOTE — CHART NOTE - NSCHARTNOTEFT_GEN_A_CORE
Nutrition Follow-up Note  Patient seen for: Consult received for assessment.     Source of Information: Unable to conduct a face-to-face interview at this time due to limited contact restrictions related to pt's medical condition and isolation precautions. Unable to reach pt via phone at this time (noted on nonrebreather mask, prone). Information obtained from ( ), EMR.     Current Diet: Soft  Supplement: Ensure Enlive 3x/day    Pertinent Meds: D5, D5 + sodium chloride 0.45%, Pepcid  Pertinent Labs: Na 159, BUN 87, glucose 252, magnesium 2.8  Weight: no new weights to assess, will continue to monitor and trend weights  Skin: no pressure injuries per flowsheets   Edema: no edema per flowsheets (assessed 4/19)     Hospital course per chart: Pt is a 68 yo female with PMH of HTN and prediabetes, who presented with fever, chills, dry cough and worsening SOB, admitted 3/31 for acute respiratory failure with hypoxia from pneumonia, COVID-19+. Chart reviewed, events noted. S/p Stroke Code this morning (4/20).     Previous Nutrition Diagnosis: Inadequate oral intake    New Nutrition Diagnosis: N/A    Nutrition Care Plan:  [x] In progress - being addressed with liberalized diet (Soft with no additional therapeutic restrictions), provision of oral nutrition supplement (Ensure Enlive 3/day), RD provision of Mighty Shakes 2/day  [ ] Achieved    Nutrition Interventions:  Recommend:   1) Continue current diet: Soft, monitor/adjust as needed, defer fluids to team at this time  - Monitor need for Low Sodium, Consistent Carbohydrate restrictions if intake improves   2) Continue Ensure Enlive 3x/day (350 kcal, 20 g protein in each) to optimize intake, RD to continue to provide Mighty Shakes 2x/day (200 kcal, 6 gm protein in each) to optimize intake   3) Suggest multivitamin supplementation as medically feasible to optimize nutrient intake   4) Continue to provide assistance and encouragement at mealtimes as feasible  5) RD to continue to obtain/honor food preferences as feasible, RD remains available    Monitoring and Evaluation: Monitor PO intake, weight, labs, skin, GI status, diet     RD remains available upon request and will follow up per protocol.  Edith Littlejohn, MS, RD, CDN Pager #[phone] Nutrition Follow-up Note  Patient seen for: Consult received for assessment.     Source of Information: Unable to conduct a face-to-face interview at this time due to limited contact restrictions related to pt's medical condition and isolation precautions. Unable to reach pt via phone at this time (noted on nonrebreather mask, prone). Information obtained from PCA and EMR.     Current Diet: Soft  Supplement: Ensure Enlive 3x/day    Pertinent Meds: D5, D5 + sodium chloride 0.45%, Pepcid  Pertinent Labs: Na 159, BUN 87, glucose 252, magnesium 2.8  Weight: no new weights to assess, will continue to monitor and trend weights  Skin: no pressure injuries per flowsheets   Edema: no edema per flowsheets (assessed 4/19)     Hospital course per chart: Pt is a 68 yo female with PMH of HTN and prediabetes, who presented with fever, chills, dry cough and worsening SOB, admitted 3/31 for acute respiratory failure with hypoxia from pneumonia, COVID-19+. Chart reviewed, events noted. S/p Stroke Code this morning (4/20). Pt now made DNR/DNI today (4/20).     Per PCA, pt with poor intake. No GI distress reported. Noted pt with fecal incontinence.     Previous Nutrition Diagnosis: Inadequate oral intake    New Nutrition Diagnosis: N/A    Nutrition Care Plan:  [x] In progress - being addressed with liberalized diet (Soft with no additional therapeutic restrictions), provision of oral nutrition supplement (Ensure Enlive 3/day), RD provision of Mighty Shakes 2/day  [ ] Achieved    Nutrition Interventions:  Recommend:   1) Continue current diet: Soft, defer fluids to team at this time, nutrition to be consistent with GOC, monitor/adjust as needed  - Monitor need for Low Sodium, Consistent Carbohydrate restrictions if intake improves  2) Continue Ensure Enlive 3x/day (350 kcal, 20 g protein in each) to optimize intake, RD to continue to provide Mighty Shakes 2x/day (200 kcal, 6 gm protein in each) to optimize intake, nutrition to be consistent with GOC, monitor/adjust as needed   3) Suggest multivitamin supplementation as medically feasible to optimize nutrient intake   4) Continue to provide assistance and encouragement at mealtimes as feasible  5) RD to continue to obtain/honor food preferences as feasible, RD remains available  6) Obtain current weight to identify changes, if any     Monitoring and Evaluation: Monitor PO intake, weight, labs, skin, GI status, diet     RD remains available upon request and will follow up per protocol.  Edith Littlejohn MS, RD, CDN Pager #[phone]

## 2020-04-20 NOTE — CHART NOTE - NSCHARTNOTEFT_GEN_A_CORE
At baseline before COVID-19, patient walked 2 miles a day, was AOx4, demonstrated     Re: HFpEF, patient  had previously been on diuretics for hypertension/HFpEF? (at most lasix 20 mg PO daily), but otherwise has no formal echo for HFpEF per daughter (medical professional). Spoke on phone with daughter.    At baseline before COVID-19, patient walked 2 miles a day, was AOx4, conversant, and active. She has recently finished taking care of her 91 year old aunt.    Re: HFpEF, patient  had previously been on diuretics for hypertension/HFpEF? (at most lasix 20 mg PO daily), but otherwise has no formal echo for HFpEF per daughter (medical professional).    Coy Mota, PGY1

## 2020-04-20 NOTE — PROGRESS NOTE ADULT - PROBLEM SELECTOR PROBLEM 3
Pneumonia of left lung due to infectious organism, unspecified part of lung Acute heart failure, unspecified heart failure type

## 2020-04-20 NOTE — PROGRESS NOTE ADULT - PROBLEM SELECTOR PLAN 1
Severe COVID-19  Therapy: Methylprednisone (4/2-4/6), plaquenil (4/1-4/6), anakinra TID (4/2-4/9), convalescent plasma (4/19), methylprednisone 100 mg x1 at RRT on 4/20.  O2: 15 L NRB continually proned  AC: Lovenox 60 mg BID c/b epistaxis  DDx: Received abx for CAP empiricall (zosyn 9/16, vanc and nicolette (4/17-4/18) for elevated leukocytosis and procal. Therapeutic lovenox for microthrombi.  discussed with family. Initially wanted to make Patient DNR but after facetiming with Patient, Patient was sitting up and O2 sats were 90%, decided to rescind DNR  will intubate Patient if needed

## 2020-04-20 NOTE — PROGRESS NOTE ADULT - SUBJECTIVE AND OBJECTIVE BOX
70 yo F with PMhx of HTN and prediabetes presents to the ED with complaints of fever, chills, dry cough and worsening SOB. No acute events overnight. Patient has been remaining prone as much as possible.  Symptomatically patient feels a little better but O2 requirements are not lessened. Patient still unable to wean from 02. Notes she is very thirsty and the mouth is very dry. Will give low dose hydration.Patient is showing worse signs of pre renal azotemia  and will need increased fluids Concern is that the fluids may end up in the lungs making it more difficult to breath .  Will ask for possible use of convalescent serum to try and  help with O2  saturation.        MEDICATIONS  (STANDING):  allopurinol 300 milliGRAM(s) Oral daily  dextrose 5% + sodium chloride 0.45%. 1000 milliLiter(s) (75 mL/Hr) IV Continuous <Continuous>  dextrose 5%. 1000 milliLiter(s) (50 mL/Hr) IV Continuous <Continuous>  dorzolamide 2%/timolol 0.5% Ophthalmic Solution 1 Drop(s) Both EYES two times a day  enoxaparin Injectable 60 milliGRAM(s) SubCutaneous every 12 hours  latanoprost 0.005% Ophthalmic Solution 1 Drop(s) Both EYES at bedtime  oxymetazoline 0.05% Nasal Spray 2 Spray(s) Both Nostrils two times a day    MEDICATIONS  (PRN):  acetaminophen   Tablet .. 650 milliGRAM(s) Oral every 6 hours PRN Temp greater or equal to 38C (100.4F)  benzocaine 15 mG/menthol 3.6 mG (Sugar-Free) Lozenge 1 Lozenge Oral four times a day PRN Cough  benzonatate 100 milliGRAM(s) Oral three times a day PRN Cough  diphenhydrAMINE   Injectable 50 milliGRAM(s) IV Push three times a day PRN Allergy symptoms  famotidine Injectable 20 milliGRAM(s) IV Push once PRN Infusion reaction  guaiFENesin   Syrup  (Sugar-Free) 200 milliGRAM(s) Oral four times a day PRN Cough  HYDROmorphone  Injectable 0.25 milliGRAM(s) IV Push every 8 hours PRN Moderate pain, anxiety, dyspnea  sodium chloride 0.65% Nasal 1 Spray(s) Both Nostrils four times a day PRN Nasal Congestion          VITALS:   T(C): 35.2 (04-20-20 @ 13:50), Max: 36.2 (04-19-20 @ 20:45)  HR: 87 (04-20-20 @ 13:50) (87 - 97)  BP: 128/82 (04-20-20 @ 13:50) (119/83 - 128/82)  RR: 16 (04-20-20 @ 17:34) (16 - 25)  SpO2: 93% (04-20-20 @ 17:34) (93% - 98%)  Wt(kg): --    PHYSICAL EXAM:    GENERAL: lethargic   HEAD:  Atraumatic  EYES: EOM, PERRLA, conjunctiva pink and sclera white  ENT: + epistaxis   NECK: Supple, No JVD, normal thyroid, carotids with normal upstrokes and no bruits  CHEST/LUNG: Clear to auscultation bilaterally, No rales, rhonchi, wheezing, or rubs  HEART: Regular rate and rhythm, No murmurs, rubs, or gallops  ABDOMEN: Patient currently prone  EXTREMITIES:  2+ Peripheral Pulses, No clubbing, cyanosis, or edema. No arthritis of shoulders, elbows, hands, hips, knees, ankles, or feet. No DJD C spine, T spine, or L/S spine  LYMPH: No lymphadenopathy noted  SKIN: No rashes or lesions  NERVOUS SYSTEM:  Alert but lethargic unable to speak  LABS:        CBC Full  -  ( 20 Apr 2020 12:27 )  WBC Count : 17.15 K/uL  RBC Count : 5.49 M/uL  Hemoglobin : 16.6 g/dL  Hematocrit : 55.3 %  Platelet Count - Automated : 239 K/uL  Mean Cell Volume : 100.7 fl  Mean Cell Hemoglobin : 30.2 pg  Mean Cell Hemoglobin Concentration : 30.0 gm/dL  Auto Neutrophil # : x  Auto Lymphocyte # : x  Auto Monocyte # : x  Auto Eosinophil # : x  Auto Basophil # : x  Auto Neutrophil % : x  Auto Lymphocyte % : x  Auto Monocyte % : x  Auto Eosinophil % : x  Auto Basophil % : x    04-20    159<H>  |  109<H>  |  87<H>  ----------------------------<  252<H>  3.9   |  33<H>  |  1.12    Ca    10.5      20 Apr 2020 10:04  Phos  3.6     04-20  Mg     2.8     04-20    TPro  7.7  /  Alb  3.0<L>  /  TBili  1.2  /  DBili  x   /  AST  155<H>  /  ALT  443<H>  /  AlkPhos  134<H>  04-20    LIVER FUNCTIONS - ( 20 Apr 2020 10:04 )  Alb: 3.0 g/dL / Pro: 7.7 g/dL / ALK PHOS: 134 U/L / ALT: 443 U/L / AST: 155 U/L / GGT: x               CAPILLARY BLOOD GLUCOSE      POCT Blood Glucose.: 194 mg/dL (20 Apr 2020 09:31)  POCT Blood Glucose.: 202 mg/dL (20 Apr 2020 08:14)      RADIOLOGY & ADDITIONAL TESTS:

## 2020-04-20 NOTE — CONSULT NOTE ADULT - SUBJECTIVE AND OBJECTIVE BOX
As per chart: 68 yo F with PMhx of HTN and prediabetes presents to the ED with complaints of fever, chills, dry cough and worsening SOB. Patient reports that she has been feeling febrile for the last 2 weeks. She has intermittent chills and dry cough. She feels chest pressure when she coughs. Her SOB has been worsening. She becomes short of breath when she walks to the bathroom from the bed. She also reports to have orthopnea and LE edema. She has been sleeping in the chair for the last couple of days. She denies any recent travel. She went to [email or web address] before her symptoms and thinks that she was exposed there. She reports to have HTN and prediabetes. She had C-sectionsx2 in the past. No family hx of heart or lung disease. Lives with her . Able to take care of ADLs. No hx of smoking or chronic alcohol consumptions. Allergic to neuomycin-develops rash and swelling.    (Stroke only)  NIHSS: 13  MRS: 0    ROS: as above    PAST MEDICAL & SURGICAL HISTORY:  Prediabetes  Essential hypertension  H/O:     FAMILY HISTORY:  No pertinent family history in first degree relatives    SOCIAL HISTORY:   T/E/D:   Occupation:   Lives with:     MEDICATIONS (HOME):  Home Medications:  allopurinol 300 mg oral tablet: 1 tab(s) orally once a day (2020 03:01)  Cosopt 2.23%-0.68% ophthalmic solution: 1 drop(s) to each affected eye once a day (at bedtime) (2020 03:01)  lisinopril 20 mg oral tablet: 1 tab(s) orally once a day (2020 03:01)  triamterene-hydrochlorothiazide 37.5 mg-25 mg oral tablet: 1 tab(s) orally every other day (2020 03:01)  Xalatan 0.005% ophthalmic solution: 1 drop(s) to each affected eye once a day (in the evening) (2020 03:01)    MEDICATIONS  (STANDING):  allopurinol 300 milliGRAM(s) Oral daily  dextrose 5% + sodium chloride 0.45%. 1000 milliLiter(s) (75 mL/Hr) IV Continuous <Continuous>  dorzolamide 2%/timolol 0.5% Ophthalmic Solution 1 Drop(s) Both EYES two times a day  enoxaparin Injectable 60 milliGRAM(s) SubCutaneous every 12 hours  latanoprost 0.005% Ophthalmic Solution 1 Drop(s) Both EYES at bedtime  lisinopril 20 milliGRAM(s) Oral daily  oxymetazoline 0.05% Nasal Spray 2 Spray(s) Both Nostrils two times a day    MEDICATIONS  (PRN):  acetaminophen   Tablet .. 650 milliGRAM(s) Oral every 6 hours PRN Temp greater or equal to 38C (100.4F)  benzocaine 15 mG/menthol 3.6 mG (Sugar-Free) Lozenge 1 Lozenge Oral four times a day PRN Cough  benzonatate 100 milliGRAM(s) Oral three times a day PRN Cough  diphenhydrAMINE   Injectable 50 milliGRAM(s) IV Push three times a day PRN Allergy symptoms  famotidine Injectable 20 milliGRAM(s) IV Push once PRN Infusion reaction  guaiFENesin   Syrup  (Sugar-Free) 200 milliGRAM(s) Oral four times a day PRN Cough  sodium chloride 0.65% Nasal 1 Spray(s) Both Nostrils four times a day PRN Nasal Congestion    ALLERGIES/INTOLERANCES:  Allergies  neomycin (Rash)    Intolerances    VITALS & EXAMINATION:  Vital Signs Last 24 Hrs  T(C): 36.2 (2020 04:01), Max: 36.8 (2020 12:39)  T(F): 97.2 (2020 04:01), Max: 98.2 (2020 12:39)  HR: 93 (2020 04:01) (93 - 114)  BP: 119/83 (2020 04:01) (92/63 - 121/82)  BP(mean): --  RR: 21 (2020 04:01) (20 - 25)  SpO2: 98% (2020 04:01) (93% - 98%)    General:  Constitutional: Appears stated age, in no apparent distress including pain  Head: Normocephalic & atraumatic.  Extremities: No cyanosis, clubbing, or edema.  Skin: No rashes, bruising, or discoloration.    Neurological (>12):  MS: Awake, alert oriented. Normal affect. Follows all commands. Non verbal, gives thumbs up when asked if she can't speak due to respiratory distress as well as trouble generating speech    Language: Non verbal    CNs: VFF. EOMI no nystagmus, no diplopia. V1-3 intact to LT, well developed masseter muscles b/l. No facial asymmetry b/l, full eye closure strength b/l.  Symmetric palate elevation in midline. Gag reflex deferred. Tongue midline, normal movements, no atrophy.    Motor: Normal muscle bulk & tone.   Exam limited by pronation  Able to flex knees against gravity, hand  4-/5 bilaterally. Unable to fully assess motor due to pronation and respiratory status but     Sensation: Intact to LT      LABORATORY:  CBC                       16.9   16.41 )-----------( 271      ( 2020 12:09 )             55.5     Chem 04-19    158<H>  |  106  |  92<H>  ----------------------------<  246<H>  4.4   |  33<H>  |  1.33<H>    Ca    10.7<H>      2020 12:09  Phos  4.6     04-18  Mg     2.9     04-18    TPro  7.4  /  Alb  3.1<L>  /  TBili  1.4<H>  /  DBili  x   /  AST  241<H>  /  ALT  604<H>  /  AlkPhos  138<H>  04-19    LFTs LIVER FUNCTIONS - ( 2020 12:09 )  Alb: 3.1 g/dL / Pro: 7.4 g/dL / ALK PHOS: 138 U/L / ALT: 604 U/L / AST: 241 U/L / GGT: x           Coagulopathy   Lipid Panel   A1c   Cardiac enzymes     U/A   CSF  Immunological  Other    STUDIES & IMAGING:  Studies (EKG, EEG, EMG, etc):     Radiology (XR, CT, MR, U/S, TTE/CRYSTAL): As per chart: 68 yo F with PMhx of HTN and prediabetes presents to the ED with complaints of fever, chills, dry cough and worsening SOB. Patient reports that she has been feeling febrile for the last 2 weeks. She has intermittent chills and dry cough. She feels chest pressure when she coughs. Her SOB has been worsening. She becomes short of breath when she walks to the bathroom from the bed. She also reports to have orthopnea and LE edema. She has been sleeping in the chair for the last couple of days. She denies any recent travel. She went to [facility] before her symptoms and thinks that she was exposed there. She reports to have HTN and prediabetes. She had C-sectionsx2 in the past. No family hx of heart or lung disease. Lives with her . Able to take care of ADLs. No hx of smoking or chronic alcohol consumptions.     (Stroke only)  NIHSS: 13  MRS: 0    ROS: as above    PAST MEDICAL & SURGICAL HISTORY:  Prediabetes  Essential hypertension  H/O:     FAMILY HISTORY:  No pertinent family history in first degree relatives    SOCIAL HISTORY:   T/E/D:   Occupation:   Lives with:     MEDICATIONS (HOME):  Home Medications:  allopurinol 300 mg oral tablet: 1 tab(s) orally once a day (2020 03:01)  Cosopt 2.23%-0.68% ophthalmic solution: 1 drop(s) to each affected eye once a day (at bedtime) (2020 03:01)  lisinopril 20 mg oral tablet: 1 tab(s) orally once a day (2020 03:01)  triamterene-hydrochlorothiazide 37.5 mg-25 mg oral tablet: 1 tab(s) orally every other day (2020 03:01)  Xalatan 0.005% ophthalmic solution: 1 drop(s) to each affected eye once a day (in the evening) (2020 03:01)    MEDICATIONS  (STANDING):  allopurinol 300 milliGRAM(s) Oral daily  dextrose 5% + sodium chloride 0.45%. 1000 milliLiter(s) (75 mL/Hr) IV Continuous <Continuous>  dorzolamide 2%/timolol 0.5% Ophthalmic Solution 1 Drop(s) Both EYES two times a day  enoxaparin Injectable 60 milliGRAM(s) SubCutaneous every 12 hours  latanoprost 0.005% Ophthalmic Solution 1 Drop(s) Both EYES at bedtime  lisinopril 20 milliGRAM(s) Oral daily  oxymetazoline 0.05% Nasal Spray 2 Spray(s) Both Nostrils two times a day    MEDICATIONS  (PRN):  acetaminophen   Tablet .. 650 milliGRAM(s) Oral every 6 hours PRN Temp greater or equal to 38C (100.4F)  benzocaine 15 mG/menthol 3.6 mG (Sugar-Free) Lozenge 1 Lozenge Oral four times a day PRN Cough  benzonatate 100 milliGRAM(s) Oral three times a day PRN Cough  diphenhydrAMINE   Injectable 50 milliGRAM(s) IV Push three times a day PRN Allergy symptoms  famotidine Injectable 20 milliGRAM(s) IV Push once PRN Infusion reaction  guaiFENesin   Syrup  (Sugar-Free) 200 milliGRAM(s) Oral four times a day PRN Cough  sodium chloride 0.65% Nasal 1 Spray(s) Both Nostrils four times a day PRN Nasal Congestion    ALLERGIES/INTOLERANCES:  Allergies  neomycin (Rash)    Intolerances    VITALS & EXAMINATION:  Vital Signs Last 24 Hrs  T(C): 36.2 (2020 04:01), Max: 36.8 (2020 12:39)  T(F): 97.2 (2020 04:01), Max: 98.2 (2020 12:39)  HR: 93 (2020 04:01) (93 - 114)  BP: 119/83 (2020 04:01) (92/63 - 121/82)  BP(mean): --  RR: 21 (2020 04:01) (20 - 25)  SpO2: 98% (2020 04:01) (93% - 98%)    General:  Constitutional: Appears stated age, in no apparent distress including pain  Head: Normocephalic & atraumatic.  Extremities: No cyanosis, clubbing, or edema.  Skin: No rashes, bruising, or discoloration.    Neurological (>12):  MS: Awake, alert oriented. Normal affect. Follows all commands. Non verbal, gives thumbs up when asked if she can't speak due to respiratory distress as well as trouble generating speech    Language: Non verbal    CNs: VFF. EOMI no nystagmus, no diplopia. V1-3 intact to LT, well developed masseter muscles b/l. No facial asymmetry b/l, full eye closure strength b/l.  Symmetric palate elevation in midline. Gag reflex deferred. Tongue midline, normal movements, no atrophy.    Motor: Normal muscle bulk & tone.   Exam limited by pronation  Able to flex knees against gravity, hand  4-/5 bilaterally. Unable to fully assess motor due to pronation and respiratory status but     Sensation: Intact to LT      LABORATORY:  CBC                       16.9   16.41 )-----------( 271      ( 2020 12:09 )             55.5     Chem 04-19    158<H>  |  106  |  92<H>  ----------------------------<  246<H>  4.4   |  33<H>  |  1.33<H>    Ca    10.7<H>      2020 12:09  Phos  4.6     04-18  Mg     2.9     04-18    TPro  7.4  /  Alb  3.1<L>  /  TBili  1.4<H>  /  DBili  x   /  AST  241<H>  /  ALT  604<H>  /  AlkPhos  138<H>  04-19    LFTs LIVER FUNCTIONS - ( 2020 12:09 )  Alb: 3.1 g/dL / Pro: 7.4 g/dL / ALK PHOS: 138 U/L / ALT: 604 U/L / AST: 241 U/L / GGT: x           Coagulopathy   Lipid Panel   A1c   Cardiac enzymes     U/A   CSF  Immunological  Other    STUDIES & IMAGING:  Studies (EKG, EEG, EMG, etc):     Radiology (XR, CT, MR, U/S, TTE/CRYSTAL):

## 2020-04-20 NOTE — CONSULT NOTE ADULT - ASSESSMENT
70 yo F with PMhx of HTN and prediabetes presents to the ED with complaints of fever, chills, dry cough and worsening SOB. LKW > 24 hours prior; inability to speak less likely 2/2 stroke, more likely 2/2 toxic/metabolic or respiratory distress. CT head w/o contrast w/ no bleed or mass effect. CTA H/N deferred due to concern over renal status, unlikely to  at this point in time. Outside window for TPA or thrombectomy.     Recs:  Repeat CT head w/o contrast in 2-3 days to screen for infarct  Management of toxic metabolic insult as per primary team  Continue AC, start atorvastatin 80mg qHs in the interim if no contraindication  Would check Hba1c, LDL

## 2020-04-20 NOTE — PROGRESS NOTE ADULT - PROBLEM SELECTOR PLAN 8
Transitions of Care Status:  1.  Name of PCP: Dr. Staples  2.  PCP Contacted on Admission: [ ] Y    [X ] N    3.  PCP contacted at Discharge: [ ] Y    [ ] N    [ ] N/A  4.  Post-Discharge Appointment Date and Location:  5.  Summary of Handoff given to PCP:
start an insulin sliding scale  glucose elevated with D5 for hypernatremia
Transition of Care Status:  1. Name of PCP:  2. PCP contacted on admission: [  ] Y     [  ] N  3. PCP contacted at discharge: [  ] Y     [  ] N  4. Post-discharge appointment date and location:  5. Summary of handoff given to PCP:
Transitions of Care Status:  1.  Name of PCP: Dr. Staples  2.  PCP Contacted on Admission: [ ] Y    [X ] N    3.  PCP contacted at Discharge: [ ] Y    [ ] N    [ ] N/A  4.  Post-Discharge Appointment Date and Location:  5.  Summary of Handoff given to PCP:

## 2020-04-20 NOTE — RAPID RESPONSE TEAM SUMMARY - NSSITUATIONBACKGROUNDRRT_GEN_ALL_CORE
70 yo F with PMhx of HTN and prediabetes, RRT called for hypoxic respiratory failure. Pt found to be in prone position with 15L NRB saturating at high 80s to low 90s. HD stable. There was code stroke called earlier with unremarkable findings on CT head. per primary team patient has acute encephelopathy likely hypoxia vs infection. She is full code. Pt's oxygen saturation improved to low 90s without any intervention. will obtain ABG, primary team to follow the results.

## 2020-04-20 NOTE — PROGRESS NOTE ADULT - PROBLEM SELECTOR PLAN 4
lasix held  given fluid   will reassess may need to start D5 with hyponatremia D/c lisinopril 20 mg in setting of GWENDOLYN

## 2020-04-20 NOTE — PROGRESS NOTE ADULT - PROBLEM SELECTOR PLAN 6
Diet: regular  Dvt ppx: Lovenox Hypernatremia to 159  -Begin D5 at 50 ml/hr until 10:00pm with f/u repeat CMP

## 2020-04-20 NOTE — PROGRESS NOTE ADULT - ASSESSMENT
70 yo F with PMhx of HTN, HFpEF prediabetes presents to the ED with complaints of fever, chills, dry cough and worsening SOB admit to medicine for acute hypoxic respiratory failure 2/2 COVID-19 pneumonia.

## 2020-04-20 NOTE — RAPID RESPONSE TEAM SUMMARY - NSADDTLFINDINGSRRT_GEN_ALL_CORE
RRT called for hypoxia in the 70s. Family was called and confirmed full code. Patient is awake, able to move one finger. Has been proned for the past few days, RR in the 30s.  Unable to get pulse oximetry, anesthesia was called. ABG was obtained. Pulse ox improved to 90s. MICU and anesthesia at bedside. RRT called for hypoxia in the 70s. Family was called and confirmed full code. Patient is awake, able to move one finger. Has been proned for the past few days, RR in the 30s.  Unable to get pulse oximetry, anesthesia was called. ABG was obtained. Pulse ox improved to 90s. MICU and anesthesia at bedside.     ABG was obtained which showed low PO2 and elevated lactate with abg calculated sat of 73. Given low O2 and lactate indicated poor tissue oxygenation, decision was made to intubate. She was unproned, her blood pressure was soft, she was started on iv fluids and pressors. Sedated and intubated by anesthesia. Sign out provided to MICU. Primary team to notify attending.   See RRT sheet for further details.

## 2020-04-20 NOTE — PROGRESS NOTE ADULT - PROBLEM SELECTOR PLAN 3
Management as above  Per patient daughter Joanie, patient FULL CODE Heart failure with preserved ejection fraction  -Holding diuresis  -Appears dry on exam

## 2020-04-20 NOTE — CONSULT NOTE ADULT - ATTENDING COMMENTS
*** Pt not personally seen in compliance with Hospital policy to reduce exposure and use of PPE**    Case discussed with resident. Agree with assessment and plan as above. Please contact neurology with any questions or concerns.

## 2020-04-20 NOTE — PROGRESS NOTE ADULT - PROBLEM SELECTOR PLAN 2
Currently on NRB 15  continue to follow sats  -patient  full code Currently on NRB 15 and proned  Will attempt to supine today  continue to follow sats  -patient DNR/DNI

## 2020-04-20 NOTE — PROGRESS NOTE ADULT - PROBLEM SELECTOR PLAN 1
-s/p steroids and Kineret  -continue supportive care  received convalescent serum infusion   vigorous incentive spirometry  -s/p plaquenil and azithromycin  -O2 sats are slightly improved today  -encourage proning or lateral decubitus positioning Severe COVID-19  Therapy: Methylprednisone (4/2-4/6), plaquenil (4/1-4/6), anakinra TID (4/2-4/9), convalescent plasma (4/19), methylprednisone 100 mg x1 at RRT on 4/20.  O2: 15 L NRB continually proned  AC: Lovenox 60 mg BID c/b epistaxis  DDx: Received abx for CAP empiricall (zosyn 9/16, vanc and nicolette (4/17-4/18) for elevated leukocytosis and procal. Therapeutic lovenox for microthrombi.  DNR/DNI

## 2020-04-20 NOTE — PROGRESS NOTE ADULT - SUBJECTIVE AND OBJECTIVE BOX
PROGRESS NOTE:     Patient is a 69y old  Female who presents with a chief complaint of 69F p/w sob and fever (20 Apr 2020 08:21)      SUBJECTIVE / OVERNIGHT EVENTS:    ADDITIONAL REVIEW OF SYSTEMS:    MEDICATIONS  (STANDING):  allopurinol 300 milliGRAM(s) Oral daily  dextrose 5% + sodium chloride 0.45%. 1000 milliLiter(s) (75 mL/Hr) IV Continuous <Continuous>  dorzolamide 2%/timolol 0.5% Ophthalmic Solution 1 Drop(s) Both EYES two times a day  enoxaparin Injectable 60 milliGRAM(s) SubCutaneous every 12 hours  latanoprost 0.005% Ophthalmic Solution 1 Drop(s) Both EYES at bedtime  lisinopril 20 milliGRAM(s) Oral daily  oxymetazoline 0.05% Nasal Spray 2 Spray(s) Both Nostrils two times a day    MEDICATIONS  (PRN):  acetaminophen   Tablet .. 650 milliGRAM(s) Oral every 6 hours PRN Temp greater or equal to 38C (100.4F)  benzocaine 15 mG/menthol 3.6 mG (Sugar-Free) Lozenge 1 Lozenge Oral four times a day PRN Cough  benzonatate 100 milliGRAM(s) Oral three times a day PRN Cough  diphenhydrAMINE   Injectable 50 milliGRAM(s) IV Push three times a day PRN Allergy symptoms  famotidine Injectable 20 milliGRAM(s) IV Push once PRN Infusion reaction  guaiFENesin   Syrup  (Sugar-Free) 200 milliGRAM(s) Oral four times a day PRN Cough  sodium chloride 0.65% Nasal 1 Spray(s) Both Nostrils four times a day PRN Nasal Congestion      CAPILLARY BLOOD GLUCOSE      POCT Blood Glucose.: 194 mg/dL (20 Apr 2020 09:31)  POCT Blood Glucose.: 202 mg/dL (20 Apr 2020 08:14)    I&O's Summary      PHYSICAL EXAM:  Vital Signs Last 24 Hrs  T(C): 36.2 (20 Apr 2020 04:01), Max: 36.8 (19 Apr 2020 12:39)  T(F): 97.2 (20 Apr 2020 04:01), Max: 98.2 (19 Apr 2020 12:39)  HR: 93 (20 Apr 2020 04:01) (93 - 114)  BP: 119/83 (20 Apr 2020 04:01) (92/63 - 121/82)  BP(mean): --  RR: 21 (20 Apr 2020 04:01) (20 - 25)  SpO2: 98% (20 Apr 2020 04:01) (93% - 98%)    CONSTITUTIONAL: NAD, well-developed  CARDIOVASCULAR: Regular rate and rhythm. Normal S1 and S2. No murmurs.  RESPIRATORY: Normal respiratory effort, Lungs CTAB  EXTREMITIES: No TAMIR, peripheral pulses intact.  ABDOMEN: Nontender to palpation, normoactive bowel sounds, no rebound/guarding; No hepatosplenomegaly  MUSCLOSKELETAL: no clubbing or cyanosis of digits; no joint swelling or tenderness to palpation  PSYCH: A+O to person, place, and time; affect appropriate    LABS:                        16.7   20.89 )-----------( 267      ( 20 Apr 2020 10:04 )             55.5     04-20    159<H>  |  109<H>  |  87<H>  ----------------------------<  252<H>  3.9   |  33<H>  |  1.12    Ca    10.5      20 Apr 2020 10:04  Phos  3.6     04-20  Mg     2.8     04-20    TPro  7.7  /  Alb  3.0<L>  /  TBili  1.2  /  DBili  x   /  AST  155<H>  /  ALT  443<H>  /  AlkPhos  134<H>  04-20                RADIOLOGY & ADDITIONAL TESTS:  Results Reviewed:   Imaging Personally Reviewed:  Electrocardiogram Personally Reviewed:    COORDINATION OF CARE:  Care Discussed with Consultants/Other Providers [Y/N]:  Prior or Outpatient Records Reviewed [Y/N]: PROGRESS NOTE:     Patient is a 69y old  Female who presents with a chief complaint of 69F p/w sob and fever (20 Apr 2020 08:21)    SUBJECTIVE / OVERNIGHT EVENTS:  IE: Code stroke for expressive aphasia. CT head shows no acute lesions. RRT called for hypoxia to the 70s, received steroids once, and spontaneously recovered to the 90s. Attending spoke with patient and she was made DNR/DNI because although she has limited verbal she is able to still communicate yes and no answers consistently, follow commands, and attend and so is consentable for DNR/DNI status.    Patient found to have expressive aphasia. History is limited to yes and no questions.  She squeezes fingers to answer yes/no questions. History still limited. She says that her expressive aphasia is different and started overnight, but collateral from daughter and nurse suggested that this was not the case and she has been able to speak words for days.    ADDITIONAL REVIEW OF SYSTEMS:  +neck pain, +sob    MEDICATIONS  (STANDING):  allopurinol 300 milliGRAM(s) Oral daily  dextrose 5% + sodium chloride 0.45%. 1000 milliLiter(s) (75 mL/Hr) IV Continuous <Continuous>  dorzolamide 2%/timolol 0.5% Ophthalmic Solution 1 Drop(s) Both EYES two times a day  enoxaparin Injectable 60 milliGRAM(s) SubCutaneous every 12 hours  latanoprost 0.005% Ophthalmic Solution 1 Drop(s) Both EYES at bedtime  lisinopril 20 milliGRAM(s) Oral daily  oxymetazoline 0.05% Nasal Spray 2 Spray(s) Both Nostrils two times a day    MEDICATIONS  (PRN):  acetaminophen   Tablet .. 650 milliGRAM(s) Oral every 6 hours PRN Temp greater or equal to 38C (100.4F)  benzocaine 15 mG/menthol 3.6 mG (Sugar-Free) Lozenge 1 Lozenge Oral four times a day PRN Cough  benzonatate 100 milliGRAM(s) Oral three times a day PRN Cough  diphenhydrAMINE   Injectable 50 milliGRAM(s) IV Push three times a day PRN Allergy symptoms  famotidine Injectable 20 milliGRAM(s) IV Push once PRN Infusion reaction  guaiFENesin   Syrup  (Sugar-Free) 200 milliGRAM(s) Oral four times a day PRN Cough  sodium chloride 0.65% Nasal 1 Spray(s) Both Nostrils four times a day PRN Nasal Congestion      CAPILLARY BLOOD GLUCOSE      POCT Blood Glucose.: 194 mg/dL (20 Apr 2020 09:31)  POCT Blood Glucose.: 202 mg/dL (20 Apr 2020 08:14)    I&O's Summary      PHYSICAL EXAM:  Vital Signs Last 24 Hrs  T(C): 36.2 (20 Apr 2020 04:01), Max: 36.8 (19 Apr 2020 12:39)  T(F): 97.2 (20 Apr 2020 04:01), Max: 98.2 (19 Apr 2020 12:39)  HR: 93 (20 Apr 2020 04:01) (93 - 114)  BP: 119/83 (20 Apr 2020 04:01) (92/63 - 121/82)  BP(mean): --  RR: 21 (20 Apr 2020 04:01) (20 - 25)  SpO2: 98% (20 Apr 2020 04:01) (93% - 98%)    CONSTITUTIONAL: Appears uncomfortable. Prone.  HEENT: Dry mucous membranes  CARDIOVASCULAR: Could not be auscultated as patient is prone. No JVD. No TAMIR.   RESPIRATORY: Decreased breath sounds in all lung fields.  EXTREMITIES: Weak peripheral radial pulses.  ABDOMEN: Not examined, prone.  NEURO: Weak right K flexion. Moves all extremities. Follows commands.    LABS:                        16.7   20.89 )-----------( 267      ( 20 Apr 2020 10:04 )             55.5     04-20    159<H>  |  109<H>  |  87<H>  ----------------------------<  252<H>  3.9   |  33<H>  |  1.12    Ca    10.5      20 Apr 2020 10:04  Phos  3.6     04-20  Mg     2.8     04-20    TPro  7.7  /  Alb  3.0<L>  /  TBili  1.2  /  DBili  x   /  AST  155<H>  /  ALT  443<H>  /  AlkPhos  134<H>  04-20                RADIOLOGY & ADDITIONAL TESTS:  Results Reviewed: Leukocytosis. Hypernatremia to 159 up from 158. Creatinine improved to 1.12.  Imaging Personally Reviewed: No acute abnormality on CTH.  Electrocardiogram Personally Reviewed:    COORDINATION OF CARE:  Care Discussed with Consultants/Other Providers [Y/N]:  Prior or Outpatient Records Reviewed [Y/N]:

## 2020-04-20 NOTE — PROGRESS NOTE ADULT - ASSESSMENT
68 yo F with PMhx of HTN and prediabetes presents to the ED with complaints of fever, chills, dry cough and worsening SOB admit to medicine for acute hypoxic respiratory failure 2/2 COVID-19 pneumonia. 68 yo F with PMhx of HTN, HFpEF prediabetes presents to the ED with complaints of fever, chills, dry cough and worsening SOB admit to medicine for acute hypoxic respiratory failure 2/2 COVID-19 pneumonia.

## 2020-04-21 LAB
ALBUMIN SERPL ELPH-MCNC: 2.8 G/DL — LOW (ref 3.3–5)
ALBUMIN SERPL ELPH-MCNC: 3.8 G/DL — SIGNIFICANT CHANGE UP (ref 3.3–5)
ALP SERPL-CCNC: 123 U/L — HIGH (ref 40–120)
ALP SERPL-CCNC: 89 U/L — SIGNIFICANT CHANGE UP (ref 40–120)
ALT FLD-CCNC: 196 U/L — HIGH (ref 10–45)
ALT FLD-CCNC: 317 U/L — HIGH (ref 10–45)
ANION GAP SERPL CALC-SCNC: 14 MMOL/L — SIGNIFICANT CHANGE UP (ref 5–17)
ANION GAP SERPL CALC-SCNC: 9 MMOL/L — SIGNIFICANT CHANGE UP (ref 5–17)
APPEARANCE UR: CLEAR — SIGNIFICANT CHANGE UP
APTT BLD: 27.3 SEC — LOW (ref 27.5–36.3)
AST SERPL-CCNC: 109 U/L — HIGH (ref 10–40)
AST SERPL-CCNC: 67 U/L — HIGH (ref 10–40)
BACTERIA # UR AUTO: NEGATIVE — SIGNIFICANT CHANGE UP
BILIRUB SERPL-MCNC: 0.5 MG/DL — SIGNIFICANT CHANGE UP (ref 0.2–1.2)
BILIRUB SERPL-MCNC: 0.7 MG/DL — SIGNIFICANT CHANGE UP (ref 0.2–1.2)
BILIRUB UR-MCNC: NEGATIVE — SIGNIFICANT CHANGE UP
BUN SERPL-MCNC: 83 MG/DL — HIGH (ref 7–23)
BUN SERPL-MCNC: 98 MG/DL — HIGH (ref 7–23)
CALCIUM SERPL-MCNC: 10.1 MG/DL — SIGNIFICANT CHANGE UP (ref 8.4–10.5)
CALCIUM SERPL-MCNC: 9.6 MG/DL — SIGNIFICANT CHANGE UP (ref 8.4–10.5)
CHLORIDE SERPL-SCNC: 109 MMOL/L — HIGH (ref 96–108)
CHLORIDE SERPL-SCNC: 116 MMOL/L — HIGH (ref 96–108)
CK SERPL-CCNC: 124 U/L — SIGNIFICANT CHANGE UP (ref 25–170)
CO2 SERPL-SCNC: 34 MMOL/L — HIGH (ref 22–31)
CO2 SERPL-SCNC: 34 MMOL/L — HIGH (ref 22–31)
COLOR SPEC: YELLOW — SIGNIFICANT CHANGE UP
CREAT SERPL-MCNC: 1.37 MG/DL — HIGH (ref 0.5–1.3)
CREAT SERPL-MCNC: 1.54 MG/DL — HIGH (ref 0.5–1.3)
CRP SERPL-MCNC: 5.48 MG/DL — HIGH (ref 0–0.4)
D DIMER BLD IA.RAPID-MCNC: 389 NG/ML DDU — HIGH
DIFF PNL FLD: ABNORMAL
EPI CELLS # UR: 0 /HPF — SIGNIFICANT CHANGE UP
FERRITIN SERPL-MCNC: 2463 NG/ML — HIGH (ref 15–150)
GAS PNL BLDA: SIGNIFICANT CHANGE UP
GLUCOSE BLDC GLUCOMTR-MCNC: 173 MG/DL — HIGH (ref 70–99)
GLUCOSE BLDC GLUCOMTR-MCNC: 264 MG/DL — HIGH (ref 70–99)
GLUCOSE SERPL-MCNC: 220 MG/DL — HIGH (ref 70–99)
GLUCOSE SERPL-MCNC: 260 MG/DL — HIGH (ref 70–99)
GLUCOSE UR QL: NEGATIVE — SIGNIFICANT CHANGE UP
HCT VFR BLD CALC: 42 % — SIGNIFICANT CHANGE UP (ref 34.5–45)
HGB BLD-MCNC: 12.7 G/DL — SIGNIFICANT CHANGE UP (ref 11.5–15.5)
HYALINE CASTS # UR AUTO: 6 /LPF — HIGH (ref 0–2)
INR BLD: 1.11 RATIO — SIGNIFICANT CHANGE UP (ref 0.88–1.16)
KETONES UR-MCNC: NEGATIVE — SIGNIFICANT CHANGE UP
LDH SERPL L TO P-CCNC: 418 U/L — HIGH (ref 50–242)
LEUKOCYTE ESTERASE UR-ACNC: NEGATIVE — SIGNIFICANT CHANGE UP
MAGNESIUM SERPL-MCNC: 2.8 MG/DL — HIGH (ref 1.6–2.6)
MCHC RBC-ENTMCNC: 30.1 PG — SIGNIFICANT CHANGE UP (ref 27–34)
MCHC RBC-ENTMCNC: 30.2 GM/DL — LOW (ref 32–36)
MCV RBC AUTO: 99.5 FL — SIGNIFICANT CHANGE UP (ref 80–100)
NITRITE UR-MCNC: NEGATIVE — SIGNIFICANT CHANGE UP
NRBC # BLD: 0 /100 WBCS — SIGNIFICANT CHANGE UP (ref 0–0)
PH UR: 6 — SIGNIFICANT CHANGE UP (ref 5–8)
PHOSPHATE SERPL-MCNC: 2.5 MG/DL — SIGNIFICANT CHANGE UP (ref 2.5–4.5)
PLATELET # BLD AUTO: 229 K/UL — SIGNIFICANT CHANGE UP (ref 150–400)
POTASSIUM SERPL-MCNC: 3.4 MMOL/L — LOW (ref 3.5–5.3)
POTASSIUM SERPL-MCNC: 5.7 MMOL/L — HIGH (ref 3.5–5.3)
POTASSIUM SERPL-SCNC: 3.4 MMOL/L — LOW (ref 3.5–5.3)
POTASSIUM SERPL-SCNC: 5.7 MMOL/L — HIGH (ref 3.5–5.3)
PROT SERPL-MCNC: 6.1 G/DL — SIGNIFICANT CHANGE UP (ref 6–8.3)
PROT SERPL-MCNC: 6.3 G/DL — SIGNIFICANT CHANGE UP (ref 6–8.3)
PROT UR-MCNC: ABNORMAL
PROTHROM AB SERPL-ACNC: 12.8 SEC — SIGNIFICANT CHANGE UP (ref 10–12.9)
RBC # BLD: 4.22 M/UL — SIGNIFICANT CHANGE UP (ref 3.8–5.2)
RBC # FLD: 14.6 % — HIGH (ref 10.3–14.5)
RBC CASTS # UR COMP ASSIST: 1 /HPF — SIGNIFICANT CHANGE UP (ref 0–4)
SODIUM SERPL-SCNC: 157 MMOL/L — HIGH (ref 135–145)
SODIUM SERPL-SCNC: 159 MMOL/L — HIGH (ref 135–145)
SP GR SPEC: 1.02 — SIGNIFICANT CHANGE UP (ref 1.01–1.02)
T3 SERPL-MCNC: 90 NG/DL — SIGNIFICANT CHANGE UP (ref 80–200)
T4 AB SER-ACNC: 7 UG/DL — SIGNIFICANT CHANGE UP (ref 4.6–12)
TROPONIN T, HIGH SENSITIVITY RESULT: 65 NG/L — HIGH (ref 0–51)
TSH SERPL-MCNC: 0.63 UIU/ML — SIGNIFICANT CHANGE UP (ref 0.27–4.2)
UROBILINOGEN FLD QL: NEGATIVE — SIGNIFICANT CHANGE UP
WBC # BLD: 23.48 K/UL — HIGH (ref 3.8–10.5)
WBC # FLD AUTO: 23.48 K/UL — HIGH (ref 3.8–10.5)
WBC UR QL: 1 /HPF — SIGNIFICANT CHANGE UP (ref 0–5)

## 2020-04-21 PROCEDURE — 36620 INSERTION CATHETER ARTERY: CPT | Mod: CS

## 2020-04-21 PROCEDURE — 99291 CRITICAL CARE FIRST HOUR: CPT | Mod: CS,25

## 2020-04-21 PROCEDURE — 36556 INSERT NON-TUNNEL CV CATH: CPT | Mod: CS

## 2020-04-21 PROCEDURE — 71045 X-RAY EXAM CHEST 1 VIEW: CPT | Mod: 26,77

## 2020-04-21 PROCEDURE — 32556 INSERT CATH PLEURA W/O IMAGE: CPT | Mod: CS

## 2020-04-21 PROCEDURE — 99292 CRITICAL CARE ADDL 30 MIN: CPT | Mod: 25,CS

## 2020-04-21 PROCEDURE — 71045 X-RAY EXAM CHEST 1 VIEW: CPT | Mod: 26,CS

## 2020-04-21 RX ORDER — POTASSIUM CHLORIDE 20 MEQ
10 PACKET (EA) ORAL
Refills: 0 | Status: COMPLETED | OUTPATIENT
Start: 2020-04-21 | End: 2020-04-21

## 2020-04-21 RX ORDER — CALCIUM GLUCONATE 100 MG/ML
1 VIAL (ML) INTRAVENOUS ONCE
Refills: 0 | Status: COMPLETED | OUTPATIENT
Start: 2020-04-21 | End: 2020-04-21

## 2020-04-21 RX ORDER — CHLORHEXIDINE GLUCONATE 213 G/1000ML
15 SOLUTION TOPICAL EVERY 12 HOURS
Refills: 0 | Status: DISCONTINUED | OUTPATIENT
Start: 2020-04-21 | End: 2020-04-21

## 2020-04-21 RX ORDER — PROPOFOL 10 MG/ML
50 INJECTION, EMULSION INTRAVENOUS
Qty: 500 | Refills: 0 | Status: DISCONTINUED | OUTPATIENT
Start: 2020-04-21 | End: 2020-04-23

## 2020-04-21 RX ORDER — POTASSIUM CHLORIDE 20 MEQ
40 PACKET (EA) ORAL ONCE
Refills: 0 | Status: COMPLETED | OUTPATIENT
Start: 2020-04-21 | End: 2020-04-21

## 2020-04-21 RX ORDER — SODIUM CHLORIDE 9 MG/ML
1000 INJECTION INTRAMUSCULAR; INTRAVENOUS; SUBCUTANEOUS
Refills: 0 | Status: DISCONTINUED | OUTPATIENT
Start: 2020-04-21 | End: 2020-04-22

## 2020-04-21 RX ORDER — ROCURONIUM BROMIDE 10 MG/ML
8 VIAL (ML) INTRAVENOUS
Qty: 500 | Refills: 0 | Status: DISCONTINUED | OUTPATIENT
Start: 2020-04-21 | End: 2020-04-22

## 2020-04-21 RX ORDER — ALBUMIN HUMAN 25 %
250 VIAL (ML) INTRAVENOUS
Refills: 0 | Status: COMPLETED | OUTPATIENT
Start: 2020-04-21 | End: 2020-04-21

## 2020-04-21 RX ORDER — INSULIN LISPRO 100/ML
VIAL (ML) SUBCUTANEOUS EVERY 6 HOURS
Refills: 0 | Status: DISCONTINUED | OUTPATIENT
Start: 2020-04-21 | End: 2020-04-23

## 2020-04-21 RX ORDER — FENTANYL CITRATE 50 UG/ML
100 INJECTION INTRAVENOUS ONCE
Refills: 0 | Status: DISCONTINUED | OUTPATIENT
Start: 2020-04-21 | End: 2020-04-21

## 2020-04-21 RX ORDER — INSULIN HUMAN 100 [IU]/ML
10 INJECTION, SOLUTION SUBCUTANEOUS ONCE
Refills: 0 | Status: COMPLETED | OUTPATIENT
Start: 2020-04-21 | End: 2020-04-21

## 2020-04-21 RX ORDER — VASOPRESSIN 20 [USP'U]/ML
0.1 INJECTION INTRAVENOUS
Qty: 50 | Refills: 0 | Status: DISCONTINUED | OUTPATIENT
Start: 2020-04-21 | End: 2020-04-22

## 2020-04-21 RX ORDER — CHLORHEXIDINE GLUCONATE 213 G/1000ML
1 SOLUTION TOPICAL
Refills: 0 | Status: DISCONTINUED | OUTPATIENT
Start: 2020-04-21 | End: 2020-04-23

## 2020-04-21 RX ORDER — SODIUM CHLORIDE 9 MG/ML
500 INJECTION INTRAMUSCULAR; INTRAVENOUS; SUBCUTANEOUS ONCE
Refills: 0 | Status: COMPLETED | OUTPATIENT
Start: 2020-04-21 | End: 2020-04-21

## 2020-04-21 RX ORDER — DEXTROSE 50 % IN WATER 50 %
50 SYRINGE (ML) INTRAVENOUS ONCE
Refills: 0 | Status: COMPLETED | OUTPATIENT
Start: 2020-04-21 | End: 2020-04-21

## 2020-04-21 RX ORDER — KETAMINE HYDROCHLORIDE 100 MG/ML
0.1 INJECTION INTRAMUSCULAR; INTRAVENOUS
Qty: 1000 | Refills: 0 | Status: DISCONTINUED | OUTPATIENT
Start: 2020-04-21 | End: 2020-04-23

## 2020-04-21 RX ORDER — NOREPINEPHRINE BITARTRATE/D5W 8 MG/250ML
0.05 PLASTIC BAG, INJECTION (ML) INTRAVENOUS
Qty: 8 | Refills: 0 | Status: DISCONTINUED | OUTPATIENT
Start: 2020-04-21 | End: 2020-04-23

## 2020-04-21 RX ORDER — DEXTROSE 50 % IN WATER 50 %
25 SYRINGE (ML) INTRAVENOUS ONCE
Refills: 0 | Status: COMPLETED | OUTPATIENT
Start: 2020-04-21 | End: 2020-04-21

## 2020-04-21 RX ORDER — DEXAMETHASONE 0.5 MG/5ML
10 ELIXIR ORAL DAILY
Refills: 0 | Status: CANCELLED | OUTPATIENT
Start: 2020-04-26 | End: 2020-04-23

## 2020-04-21 RX ORDER — MAGNESIUM SULFATE 500 MG/ML
1 VIAL (ML) INJECTION ONCE
Refills: 0 | Status: COMPLETED | OUTPATIENT
Start: 2020-04-21 | End: 2020-04-21

## 2020-04-21 RX ORDER — CHLORHEXIDINE GLUCONATE 213 G/1000ML
15 SOLUTION TOPICAL EVERY 12 HOURS
Refills: 0 | Status: DISCONTINUED | OUTPATIENT
Start: 2020-04-21 | End: 2020-04-22

## 2020-04-21 RX ORDER — SODIUM CHLORIDE 9 MG/ML
10 INJECTION INTRAMUSCULAR; INTRAVENOUS; SUBCUTANEOUS
Refills: 0 | Status: DISCONTINUED | OUTPATIENT
Start: 2020-04-21 | End: 2020-04-23

## 2020-04-21 RX ORDER — DEXAMETHASONE 0.5 MG/5ML
10 ELIXIR ORAL DAILY
Refills: 0 | Status: DISCONTINUED | OUTPATIENT
Start: 2020-04-21 | End: 2020-04-21

## 2020-04-21 RX ORDER — ROCURONIUM BROMIDE 10 MG/ML
50 VIAL (ML) INTRAVENOUS ONCE
Refills: 0 | Status: COMPLETED | OUTPATIENT
Start: 2020-04-21 | End: 2020-04-21

## 2020-04-21 RX ORDER — DEXAMETHASONE 0.5 MG/5ML
20 ELIXIR ORAL DAILY
Refills: 0 | Status: DISCONTINUED | OUTPATIENT
Start: 2020-04-21 | End: 2020-04-23

## 2020-04-21 RX ADMIN — INSULIN HUMAN 10 UNIT(S): 100 INJECTION, SOLUTION SUBCUTANEOUS at 22:20

## 2020-04-21 RX ADMIN — ENOXAPARIN SODIUM 60 MILLIGRAM(S): 100 INJECTION SUBCUTANEOUS at 21:55

## 2020-04-21 RX ADMIN — CHLORHEXIDINE GLUCONATE 15 MILLILITER(S): 213 SOLUTION TOPICAL at 06:30

## 2020-04-21 RX ADMIN — OXYMETAZOLINE HYDROCHLORIDE 2 SPRAY(S): 0.5 SPRAY NASAL at 06:29

## 2020-04-21 RX ADMIN — SODIUM CHLORIDE 1500 MILLILITER(S): 9 INJECTION INTRAMUSCULAR; INTRAVENOUS; SUBCUTANEOUS at 19:54

## 2020-04-21 RX ADMIN — CHLORHEXIDINE GLUCONATE 15 MILLILITER(S): 213 SOLUTION TOPICAL at 17:38

## 2020-04-21 RX ADMIN — Medication 4: at 11:27

## 2020-04-21 RX ADMIN — Medication 125 MILLILITER(S): at 10:30

## 2020-04-21 RX ADMIN — Medication 6: at 18:43

## 2020-04-21 RX ADMIN — Medication 40 MILLIEQUIVALENT(S): at 07:34

## 2020-04-21 RX ADMIN — Medication 100 GRAM(S): at 17:51

## 2020-04-21 RX ADMIN — DORZOLAMIDE HYDROCHLORIDE TIMOLOL MALEATE 1 DROP(S): 20; 5 SOLUTION/ DROPS OPHTHALMIC at 17:38

## 2020-04-21 RX ADMIN — SODIUM CHLORIDE 2000 MILLILITER(S): 9 INJECTION INTRAMUSCULAR; INTRAVENOUS; SUBCUTANEOUS at 10:12

## 2020-04-21 RX ADMIN — Medication 40 MILLIEQUIVALENT(S): at 06:29

## 2020-04-21 RX ADMIN — Medication 50 MILLIGRAM(S): at 04:04

## 2020-04-21 RX ADMIN — Medication 125 MILLILITER(S): at 10:29

## 2020-04-21 RX ADMIN — Medication 50 MILLIEQUIVALENT(S): at 05:05

## 2020-04-21 RX ADMIN — Medication 40 MILLIEQUIVALENT(S): at 11:40

## 2020-04-21 RX ADMIN — INSULIN HUMAN 10 UNIT(S): 100 INJECTION, SOLUTION SUBCUTANEOUS at 17:52

## 2020-04-21 RX ADMIN — Medication 25 MILLILITER(S): at 22:20

## 2020-04-21 RX ADMIN — Medication 50 MILLIEQUIVALENT(S): at 03:02

## 2020-04-21 RX ADMIN — Medication 300 MILLIGRAM(S): at 11:27

## 2020-04-21 RX ADMIN — DORZOLAMIDE HYDROCHLORIDE TIMOLOL MALEATE 1 DROP(S): 20; 5 SOLUTION/ DROPS OPHTHALMIC at 06:30

## 2020-04-21 RX ADMIN — Medication 50 MILLIEQUIVALENT(S): at 06:30

## 2020-04-21 RX ADMIN — Medication 125 MILLILITER(S): at 04:30

## 2020-04-21 RX ADMIN — Medication 50 MILLIEQUIVALENT(S): at 05:00

## 2020-04-21 RX ADMIN — FENTANYL CITRATE 100 MICROGRAM(S): 50 INJECTION INTRAVENOUS at 02:30

## 2020-04-21 RX ADMIN — Medication 100 GRAM(S): at 04:00

## 2020-04-21 RX ADMIN — Medication 125 MILLILITER(S): at 04:04

## 2020-04-21 RX ADMIN — LATANOPROST 1 DROP(S): 0.05 SOLUTION/ DROPS OPHTHALMIC; TOPICAL at 20:43

## 2020-04-21 RX ADMIN — Medication 125 MILLILITER(S): at 04:05

## 2020-04-21 RX ADMIN — HYDROMORPHONE HYDROCHLORIDE 0.25 MILLIGRAM(S): 2 INJECTION INTRAMUSCULAR; INTRAVENOUS; SUBCUTANEOUS at 22:20

## 2020-04-21 RX ADMIN — Medication 50 MILLILITER(S): at 17:52

## 2020-04-21 RX ADMIN — Medication 110 MILLIGRAM(S): at 20:43

## 2020-04-21 RX ADMIN — Medication 50 MILLIEQUIVALENT(S): at 03:00

## 2020-04-21 RX ADMIN — ENOXAPARIN SODIUM 60 MILLIGRAM(S): 100 INJECTION SUBCUTANEOUS at 10:29

## 2020-04-21 RX ADMIN — Medication 50 MILLIEQUIVALENT(S): at 03:01

## 2020-04-21 NOTE — PROGRESS NOTE ADULT - ATTENDING COMMENTS
I am a non participating [employer] physician seeing Pt in coverage for Dr Staples I am a non participating [facility] physician seeing Pt in coverage for Dr Staples. The above patient examination was reviewed with Dr. Stewart and I agree with his evaluation, assessment and treatment plan.  Hugh Staples M.D.

## 2020-04-21 NOTE — PROGRESS NOTE ADULT - SUBJECTIVE AND OBJECTIVE BOX
KIMBERLY GIBBONS  MRN-[id number]  Patient is a 69y old  Female who presents with a chief complaint of 69F p/w sob and fever (2020 17:44)    HPI:  Portions of this History and Physical Exam are adopted from ER Provider Notation per [facility] policy to limit healthcare provider exposure during COVID19 Pandemic    Unable to reach patient in ED via phone. Called the patient's  Mr. Cooper Gibbons to obtain additional history.  Adopted from ED notation:   HPI Objective Statement: 70 yo F with PMhx of HTN and prediabetes presents to the ED with complaints of fever, chills, dry cough and worsening SOB. Patient reports that she has been feeling febrile for the last 2 weeks. She has intermittent chills and dry cough. She feels chest pressure when she coughs. Her SOB has been worsening. She becomes short of breath when she walks to the bathroom from the bed. She also reports to have orthopnea and LE edema. She has been sleeping in the chair for the last couple of days. She denies any recent travel. She went to [place] before her symptoms and thinks that she was exposed there. She reports to have HTN and prediabetes. She had C-sectionsx2 in the past. No family hx of heart or lung disease. Lives with her . Able to take care of ADLs. No hx of smoking or chronic alcohol consumptions. Allergic to neuomycin-develops rash and swelling.  PCP: Dr. Hugh Pinedo    In the ED, .2, 158/89, 86->111, 18 100% NRB  s/p [id number] IVx1, potassium 40 POx1 (2020 03:20)      Surgery/Hospital course:    Tx CTU after being intubated for hypoxic respiratory failure    R pig tail for PTX     Today: Patient intubated and sedated.     REVIEW OF SYSTEMS:  Gen: No fever  EYES/ENT: No visual changes;  No vertigo or throat pain   NECK: No pain   RES:  No shortness of breath or Cough  Chest: + incisional pain  CARD: No chest pain   GI: No abdominal pain  : No dysuria  NEURO: No weakness  SKIN: No itching, rashes     Physical Exam:  Vital Signs Last 24 Hrs  T(C): 35.1 (2020 02:00), Max: 36.7 (2020 21:42)  T(F): 95.2 (2020 02:00), Max: 98 (2020 21:42)  HR: 93 (2020 02:00) (63 - 97)  BP: 132/62 (2020 01:15) (119/83 - 138/83)  BP(mean): 91 (2020 01:15) (87 - 91)  RR: 32 (2020 02:00) (10 - 32)  SpO2: 93% (2020 02:00) (93% - 99%)  Gen:  Awake, alert   CNS: non focal 	  Neck: no JVD  RES : clear , no wheezing    Chest:   + chest tubes                     CVS: Regular  rhythm. Normal S1/S2  Abd: Soft, non-distended. Bowel sounds present.  Skin: No rash.  Ext:  no edema, A Line    ============================I/O===========================   I&O's Detail    2020 07:01  -  2020 03:08  --------------------------------------------------------  IN:    dextrose 5%.: 350 mL    Oral Fluid: 240 mL  Total IN: 590 mL    OUT:    Indwelling Catheter - Urethral: 1500 mL  Total OUT: 1500 mL    Total NET: -910 mL        ============================ LABS =========================                        12.7   23.48 )-----------( 229      ( 2020 02:26 )             42.0     04-21    157<H>  |  109<H>  |  98<H>  ----------------------------<  260<H>  3.4<L>   |  34<H>  |  1.54<H>    Ca    10.1      2020 02:26  Phos  2.5     -  Mg     2.8         TPro  6.3  /  Alb  2.8<L>  /  TBili  0.7  /  DBili  x   /  AST  109<H>  /  ALT  317<H>  /  AlkPhos  123<H>      LIVER FUNCTIONS - ( 2020 02:26 )  Alb: 2.8 g/dL / Pro: 6.3 g/dL / ALK PHOS: 123 U/L / ALT: 317 U/L / AST: 109 U/L / GGT: x           PT/INR - ( 2020 02:26 )   PT: 12.8 sec;   INR: 1.11 ratio         PTT - ( 2020 02:26 )  PTT:27.3 sec  ABG - ( 2020 01:32 )  pH, Arterial: 7.49  pH, Blood: x     /  pCO2: 47    /  pO2: 131   / HCO3: 35    / Base Excess: 10.6  /  SaO2: 99          ======================Micro/Rad/Cardio=================  Culture: Reviewed   CXR: Reviewed  Echo:Reviewed  ======================================================  PAST MEDICAL & SURGICAL HISTORY:  Prediabetes  Essential hypertension  H/O:       69 Year old female COVID positive on . Tx to CTU  for hypoxic respiratory failure.     Plan:  ====================== NEUROLOGY=====================  acetaminophen   Tablet .. 650 milliGRAM(s) Oral every 6 hours PRN Temp greater or equal to 38C (100.4F)  fentaNYL    Injectable 100 MICROGram(s) IV Push once  HYDROmorphone  Injectable 0.25 milliGRAM(s) IV Push every 8 hours PRN Moderate pain, anxiety, dyspnea  ketamine Infusion. 0.1 mG/kG/Hr (0.68 mL/Hr) IV Continuous <Continuous>  propofol Infusion 50 MICROgram(s)/kG/Min (20.4 mL/Hr) IV Continuous <Continuous>  Sedated with Propofol and Ketamine   ==================== RESPIRATORY======================  Mechanical Ventilation:  Mode: AC/ CMV (Assist Control/ Continuous Mandatory Ventilation)  RR (machine): 30  TV (machine): 420  FiO2: 100  PEEP: 10  ITime: 1  MAP: 21  PIP: 56    benzonatate 100 milliGRAM(s) Oral three times a day PRN Cough  diphenhydrAMINE   Injectable 50 milliGRAM(s) IV Push three times a day PRN Allergy symptoms  guaiFENesin   Syrup  (Sugar-Free) 200 milliGRAM(s) Oral four times a day PRN Cough    ABG monitoring     ====================CARDIOVASCULAR==================  norepinephrine Infusion 0.05 MICROgram(s)/kG/Min (6.38 mL/Hr) IV Continuous <Continuous>  Levophe gtt for hypotension   ===================HEMATOLOGIC/ONC ===================  enoxaparin Injectable 60 milliGRAM(s) SubCutaneous every 12 hours    ===================== RENAL =========================  Continue monitoring urine output  Sorto insertion for strict I/O's. Electrolytes repleted.   ==================== GASTROINTESTINAL===================  albumin human  5% IVPB 250 milliLiter(s) IV Intermittent every 30 minutes  dextrose 5% + sodium chloride 0.45%. 1000 milliLiter(s) (75 mL/Hr) IV Continuous <Continuous>  dextrose 5%. 1000 milliLiter(s) (50 mL/Hr) IV Continuous <Continuous>  famotidine Injectable 20 milliGRAM(s) IV Push once PRN Infusion reaction    =======================    ENDOCRINE  =====================  allopurinol 300 milliGRAM(s) Oral daily    ========================INFECTIOUS DISEASE================  Completed COVID regimen and steroids  Convolescent Plasma on        Patient requires continuous monitoring with bedside rhythm monitoring, arterial line, pulse oximetry, ventilator monitoring and intermittent blood gas analysis.  Care plan discussed with ICU care team.  Patient remained critical; required more than usual post op care; I have spent 35 minutes providing non-routine post op care, revaluated multiple times during the day.

## 2020-04-21 NOTE — PROGRESS NOTE ADULT - PROBLEM SELECTOR PLAN 1
Severe COVID-19  Therapy: Methylprednisone (4/2-4/6), plaquenil (4/1-4/6), anakinra TID (4/2-4/9), convalescent plasma (4/19), methylprednisone 100 mg x1 at RRT on 4/20.  O2: 15 L NRB continually proned  AC: Lovenox 60 mg BID c/b epistaxis  intubated and moved to the ICU for ventilator support

## 2020-04-21 NOTE — CHART NOTE - NSCHARTNOTEFT_GEN_A_CORE
around 630pm pt with poor vent compliance, pt was ambu'd by RT to verify no mechanical complication with ventilator, vent ok, pt placed back on ventilator, ~7pm post ABG 7.17/>104/98/37, plateau pressure 44, repeated an ABG now that vent compliance improved however repeat at 730pm was unchanged. MD Ledesma and RT at bedside, switched patient to PC from VC and RR increased, however plateau pressure still around 44.  ECMO/ECor consult placed, per perfusion no more ECor devices,  myself and MD Ledesma spoke with MD Falcon, and pt not a candidate for VV ECMO given age and hospital course and recommended to try proning patient despite P/F 124, and primarily a Co2 issue. Patient proned at 9pm with repeat ABG unchanged.     Spoke with daughter, Joanie (HCP- via phone [phone]) and updated patients status and poor prognosis. She knows that this was a "last chance effort". She stated her mother was previously a DNR/DNI but yesterday when intubation was imminent the patient and the family decided to trial an intubation. Daughter's primary goal is that her mother is comfortable and that she doesn't want "extreme measures taken" but is reluctant to re-sign a DNR as she feels then she is "giving up on her mother". She understands the patient may not survive the night. Enquired if the remaining family is in agreement and she stated that her father/patients spouse and other siblings are all in agreement with plan that comfort is the primary goal. around 630pm pt with poor vent compliance, pt was ambu'd by RT to verify no mechanical complication with ventilator, vent ok, pt placed back on ventilator, ~7pm post ABG 7.17/>104/98/37, plateau pressure 44, repeated an ABG now that vent compliance improved however repeat at 730pm was unchanged. MD Ledesma and RT at bedside, switched patient to PC from VC and RR increased, however plateau pressure still around 44.  ECMO/ECor consult placed, per perfusion no more ECor devices,  myself and MD Ledesma spoke with MD Falcon, and pt not a candidate for VV ECMO given age and hospital course and recommended to try proning patient despite P/F 124, and primarily a Co2 issue. Patient proned at 9pm with repeat ABG unchanged. (MD Falcon also discussed case with MD Fragoso who also agreed patient not an ECMO/ECor candidate)    Spoke with daughter, Joanie (HCP- via phone [phone]) and updated patients status and poor prognosis. She knows that this was a "last chance effort". She stated her mother was previously a DNR/DNI but yesterday when intubation was imminent the patient and the family decided to trial an intubation. Daughter's primary goal is that her mother is comfortable and that she doesn't want "extreme measures taken" but is reluctant to re-sign a DNR as she feels then she is "giving up on her mother". She understands the patient may not survive the night. Enquired if the remaining family is in agreement and she stated that her father/patients spouse and other siblings are all in agreement with plan that comfort is the primary goal.

## 2020-04-21 NOTE — PROCEDURE NOTE - NSINDICATIONS_GEN_A_CORE
arterial puncture to obtain ABG's/monitoring purposes/critical patient
critical illness/venous access
ptx

## 2020-04-21 NOTE — PROGRESS NOTE ADULT - SUBJECTIVE AND OBJECTIVE BOX
70 yo F with PMhx of HTN and prediabetes presents to the ED with complaints of fever, chills, dry cough and worsening SOB. No acute events overnight. Patient has been remaining prone as much as possible.  Symptomatically patient feels a little better but O2 requirements are not lessened. Patient still unable to wean from 02. Patient with significant desaturation last night requiring intubation. Seen today In the ICU. Patient sedated and paralysed. OG tube placed for medication and nutrition    MEDICATIONS  (STANDING):  allopurinol 300 milliGRAM(s) Oral daily  chlorhexidine 0.12% Liquid 15 milliLiter(s) Oral Mucosa every 12 hours  chlorhexidine 2% Cloths 1 Application(s) Topical <User Schedule>  dexAMETHasone  IVPB 20 milliGRAM(s) IV Intermittent daily  dorzolamide 2%/timolol 0.5% Ophthalmic Solution 1 Drop(s) Both EYES two times a day  enoxaparin Injectable 60 milliGRAM(s) SubCutaneous every 12 hours  insulin lispro (HumaLOG) corrective regimen sliding scale   SubCutaneous every 6 hours  ketamine Infusion. 0.1 mG/kG/Hr (0.68 mL/Hr) IV Continuous <Continuous>  latanoprost 0.005% Ophthalmic Solution 1 Drop(s) Both EYES at bedtime  norepinephrine Infusion 0.05 MICROgram(s)/kG/Min (6.38 mL/Hr) IV Continuous <Continuous>  propofol Infusion 50 MICROgram(s)/kG/Min (20.4 mL/Hr) IV Continuous <Continuous>  rocuronium Infusion 8 MICROgram(s)/kG/Min (6.53 mL/Hr) IV Continuous <Continuous>  sodium chloride 0.9%. 1000 milliLiter(s) (10 mL/Hr) IV Continuous <Continuous>    MEDICATIONS  (PRN):  acetaminophen   Tablet .. 650 milliGRAM(s) Oral every 6 hours PRN Temp greater or equal to 38C (100.4F)  famotidine Injectable 20 milliGRAM(s) IV Push once PRN Infusion reaction  HYDROmorphone  Injectable 0.25 milliGRAM(s) IV Push every 8 hours PRN Moderate pain, anxiety, dyspnea  sodium chloride 0.9% lock flush 10 milliLiter(s) IV Push every 1 hour PRN Pre/post blood products, medications, blood draw, and to maintain line patency          VITALS:   T(C): 36.4 (20 @ 20:00), Max: 37 (20 @ 17:00)  HR: 109 (20 @ 22:00) (55 - 125)  BP: 132/62 (20 @ 01:15) (129/60 - 132/62)  RR: 34 (20 @ 22:00) (0 - 36)  SpO2: 94% (20 @ 22:00) (87% - 100%)  Wt(kg): --    GENERAL: lethargic   HEAD:  Atraumatic  EYES: EOM, PERRLA, conjunctiva pink and sclera white  ENT: + epistaxis   NECK: Supple, No JVD, normal thyroid, carotids with normal upstrokes and no bruits  CHEST/LUNG: Clear to auscultation bilaterally, No rales, rhonchi, wheezing, or rubs  HEART: Regular rate and rhythm, No murmurs, rubs, or gallops  ABDOMEN: Patient currently prone  EXTREMITIES:  2+ Peripheral Pulses, No clubbing, cyanosis, or edema. No arthritis of shoulders, elbows, hands, hips, knees, ankles, or feet. No DJD C spine, T spine, or L/S spine  LYMPH: No lymphadenopathy noted  SKIN: No rashes or lesions  NERVOUS SYSTEM:  Alert but lethargic unable to speak    LABS:  ABG - ( 2020 21:40 )  pH, Arterial: 7.11  pH, Blood: x     /  pCO2: >104  /  pO2: 98    / HCO3: 38    / Base Excess: 5.7   /  SaO2: 96                CARDIAC MARKERS ( 2020 02:26 )  x     / x     / 124 U/L / x     / x          CBC Full  -  ( 2020 02:26 )  WBC Count : 23.48 K/uL  RBC Count : 4.22 M/uL  Hemoglobin : 12.7 g/dL  Hematocrit : 42.0 %  Platelet Count - Automated : 229 K/uL  Mean Cell Volume : 99.5 fl  Mean Cell Hemoglobin : 30.1 pg  Mean Cell Hemoglobin Concentration : 30.2 gm/dL  Auto Neutrophil # : x  Auto Lymphocyte # : x  Auto Monocyte # : x  Auto Eosinophil # : x  Auto Basophil # : x  Auto Neutrophil % : x  Auto Lymphocyte % : x  Auto Monocyte % : x  Auto Eosinophil % : x  Auto Basophil % : x        159<H>  |  116<H>  |  83<H>  ----------------------------<  220<H>  5.7<H>   |  34<H>  |  1.37<H>    Ca    9.6      2020 15:16  Phos  2.5     -  Mg     2.8     -    TPro  6.1  /  Alb  3.8  /  TBili  0.5  /  DBili  x   /  AST  67<H>  /  ALT  196<H>  /  AlkPhos  89  -    LIVER FUNCTIONS - ( 2020 15:16 )  Alb: 3.8 g/dL / Pro: 6.1 g/dL / ALK PHOS: 89 U/L / ALT: 196 U/L / AST: 67 U/L / GGT: x           PT/INR - ( 2020 02:26 )   PT: 12.8 sec;   INR: 1.11 ratio         PTT - ( 2020 02:26 )  PTT:27.3 sec  Urinalysis Basic - ( 2020 05:57 )    Color: Yellow / Appearance: Clear / S.024 / pH: x  Gluc: x / Ketone: Negative  / Bili: Negative / Urobili: Negative   Blood: x / Protein: Trace / Nitrite: Negative   Leuk Esterase: Negative / RBC: 1 /hpf / WBC 1 /HPF   Sq Epi: x / Non Sq Epi: 0 /hpf / Bacteria: Negative      CAPILLARY BLOOD GLUCOSE      POCT Blood Glucose.: 264 mg/dL (2020 18:20)  POCT Blood Glucose.: 173 mg/dL (2020 17:43)      RADIOLOGY & ADDITIONAL TESTS:

## 2020-04-21 NOTE — PROGRESS NOTE ADULT - SUBJECTIVE AND OBJECTIVE BOX
KIMBERLY KUMAR  MRN-[id number]  Patient is a 69y old  Female who presents with a chief complaint of 69F p/w sob and fever (2020 03:08)    HPI:  Portions of this History and Physical Exam are adopted from ER Provider Notation per [facility] policy to limit healthcare provider exposure during COVID19 Pandemic    Unable to reach patient in ED via phone. Called the patient's  Mr. Cooper Kumar to obtain additional history.  Adopted from ED notation:   HPI Objective Statement: 70 yo F with PMhx of HTN and prediabetes presents to the ED with complaints of fever, chills, dry cough and worsening SOB. Patient reports that she has been feeling febrile for the last 2 weeks. She has intermittent chills and dry cough. She feels chest pressure when she coughs. Her SOB has been worsening. She becomes short of breath when she walks to the bathroom from the bed. She also reports to have orthopnea and LE edema. She has been sleeping in the chair for the last couple of days. She denies any recent travel. She went to [facility] before her symptoms and thinks that she was exposed there. She reports to have HTN and prediabetes. She had C-sectionsx2 in the past. No family hx of heart or lung disease. Lives with her . Able to take care of ADLs. No hx of smoking or chronic alcohol consumptions. Allergic to neuomycin-develops rash and swelling.  PCP: Dr. Hugh Pinedo    In the ED, .2, 158/89, 86->111, 18 100% NRB  s/p [id number] IVx1, potassium 40 POx1 (2020 03:20)    Surgery/Hospital course:  COVID 19 Positive   Tx CTU after being intubated for hypoxic respiratory failure    R pig tail for PTX     REVIEW OF SYSTEMS:  Unable to obtain, vented     Vital Signs Last 24 Hrs  T(C): 33.4 (2020 03:00), Max: 36.7 (2020 21:42)  T(F): 92.1 (2020 03:00), Max: 98 (2020 21:42)  HR: 62 (2020 06:00) (62 - 97)  BP: 132/62 (2020 01:15) (128/82 - 138/83)  BP(mean): 91 (2020 01:15) (87 - 91)  RR: 20 (2020 06:00) (10 - 35)  SpO2: 99% (2020 06:00) (93% - 100%)    ============================I/O===========================   I&O's Detail    2020 07:01  -  2020 07:00  --------------------------------------------------------  IN:    Albumin 5%  - 250 mL: 750 mL    dextrose 5%.: 350 mL    Enteral Tube Flush: 240 mL    IV PiggyBack: 300 mL    ketamine Infusion.: 30.6 mL    norepinephrine Infusion: 51.5 mL    Oral Fluid: 240 mL    propofol Infusion: 122.4 mL    rocuronium Infusion: 19.5 mL  Total IN: 2104 mL    OUT:    Indwelling Catheter - Urethral: 1675 mL  Total OUT: 1675 mL    Total NET: 429 mL        ============================ LABS =========================                        12.7   23.48 )-----------( 229      ( 2020 02:26 )             42.0         157<H>  |  109<H>  |  98<H>  ----------------------------<  260<H>  3.4<L>   |  34<H>  |  1.54<H>    Ca    10.1      2020 02:26  Phos  2.5       Mg     2.8         TPro  6.3  /  Alb  2.8<L>  /  TBili  0.7  /  DBili  x   /  AST  109<H>  /  ALT  317<H>  /  AlkPhos  123<H>      LIVER FUNCTIONS - ( 2020 02:26 )  Alb: 2.8 g/dL / Pro: 6.3 g/dL / ALK PHOS: 123 U/L / ALT: 317 U/L / AST: 109 U/L / GGT: x           PT/INR - ( 2020 02:26 )   PT: 12.8 sec;   INR: 1.11 ratio         PTT - ( 2020 02:26 )  PTT:27.3 sec  ABG - ( 2020 05:50 )  pH, Arterial: 7.46  pH, Blood: x     /  pCO2: 53    /  pO2: 78    / HCO3: 37    / Base Excess: 11.4  /  SaO2: 96                Urinalysis Basic - ( 2020 05:57 )    Color: Yellow / Appearance: Clear / S.024 / pH: x  Gluc: x / Ketone: Negative  / Bili: Negative / Urobili: Negative   Blood: x / Protein: Trace / Nitrite: Negative   Leuk Esterase: Negative / RBC: 1 /hpf / WBC 1 /HPF   Sq Epi: x / Non Sq Epi: 0 /hpf / Bacteria: Negative      ======================Micro/Rad/Cardio=================  Culture: Reviewed   CXR: Reviewed  ======================================================  PAST MEDICAL & SURGICAL HISTORY:  Prediabetes  Essential hypertension  H/O:     ====================ASSESSMENT ==============  COVID 19 Positive   Intubated 20  Hypoxic Respiratory failure     Plan:  ====================== NEUROLOGY=====================  Sedated with Ketamine, Propofol   Paralyzed with Rocuronium   Dilaudid for analgesia     acetaminophen   Tablet .. 650 milliGRAM(s) Oral every 6 hours PRN Temp greater or equal to 38C (100.4F)  HYDROmorphone  Injectable 0.25 milliGRAM(s) IV Push every 8 hours PRN Moderate pain, anxiety, dyspnea  ketamine Infusion. 0.1 mG/kG/Hr (0.68 mL/Hr) IV Continuous <Continuous>  propofol Infusion 50 MICROgram(s)/kG/Min (20.4 mL/Hr) IV Continuous <Continuous>  rocuronium Infusion 8 MICROgram(s)/kG/Min (6.53 mL/Hr) IV Continuous <Continuous>    ==================== RESPIRATORY======================  On full vent support     Mechanical Ventilation:  Mode: AC/ CMV (Assist Control/ Continuous Mandatory Ventilation)  RR (machine): 28  TV (machine): 320  FiO2: 60  PEEP: 5  ITime: 1  MAP: 14  PC: 38  PIP: 43    benzonatate 100 milliGRAM(s) Oral three times a day PRN Cough  diphenhydrAMINE   Injectable 50 milliGRAM(s) IV Push three times a day PRN Allergy symptoms  guaiFENesin   Syrup  (Sugar-Free) 200 milliGRAM(s) Oral four times a day PRN Cough    ====================CARDIOVASCULAR==================  On pressor support for hypotension     norepinephrine Infusion 0.05 MICROgram(s)/kG/Min (6.38 mL/Hr) IV Continuous <Continuous>    ===================HEMATOLOGIC/ONC ===================  VTE prophylaxis enoxaparin Injectable 60 milliGRAM(s) SubCutaneous every 12 hours    ===================== RENAL =========================  Continue monitoring urine output    ==================== GASTROINTESTINAL===================  dextrose 5% + sodium chloride 0.45%. 1000 milliLiter(s) (75 mL/Hr) IV Continuous <Continuous>  dextrose 5%. 1000 milliLiter(s) (50 mL/Hr) IV Continuous <Continuous>  GI prophylaxis, famotidine Injectable 20 milliGRAM(s) IV Push once PRN Infusion reaction  magnesium sulfate  IVPB 1 Gram(s) IV Intermittent once  potassium chloride   Solution 40 milliEquivalent(s) Oral once  potassium chloride  10 mEq/50 mL IVPB 10 milliEquivalent(s) IV Intermittent every 1 hour    =======================    ENDOCRINE  =====================  Continue to monitor blood sugar     allopurinol 300 milliGRAM(s) Oral daily    ========================INFECTIOUS DISEASE================  Completed COVID regimen and steroids  Convalescent Plasma on      Patient requires continuous monitoring with bedside rhythm monitoring, pulse ox monitoring, and intermittent blood gas analysis. Care plan discussed with ICU care team. Patient remained critical and at risk for life threatening decompensation.    By signing my name below, I, Jo Ann Lombardo, attest that this documentation has been prepared under the direction and in the presence of MYLENE Urias.  Electronically signed: Beronica Wetzel, 20 @ 07:26    I, Bradley Dick, personally performed the services described in this documentation. all medical record entries made by the beronica were at my direction and in my presence. I have reviewed the chart and agree that the record reflects my personal performance and is accurate and complete  Electronically signed: MYLENE Urias KIMBERLY KUMAR  MRN-[id number]  Patient is a 69y old  Female who presents with a chief complaint of 69F p/w sob and fever (2020 03:08)    HPI:  Portions of this History and Physical Exam are adopted from ER Provider Notation per [facility] policy to limit healthcare provider exposure during COVID19 Pandemic    Unable to reach patient in ED via phone. Called the patient's  Mr. Cooper Kumar to obtain additional history.  Adopted from ED notation:   HPI Objective Statement: 68 yo F with PMhx of HTN and prediabetes presents to the ED with complaints of fever, chills, dry cough and worsening SOB. Patient reports that she has been feeling febrile for the last 2 weeks. She has intermittent chills and dry cough. She feels chest pressure when she coughs. Her SOB has been worsening. She becomes short of breath when she walks to the bathroom from the bed. She also reports to have orthopnea and LE edema. She has been sleeping in the chair for the last couple of days. She denies any recent travel. She went to [place] before her symptoms and thinks that she was exposed there. She reports to have HTN and prediabetes. She had C-sectionsx2 in the past. No family hx of heart or lung disease. Lives with her . Able to take care of ADLs. No hx of smoking or chronic alcohol consumptions. Allergic to neuomycin-develops rash and swelling.  PCP: Dr. Hugh Pinedo    In the ED, .2, 158/89, 86->111, 18 100% NRB  s/p [id number] IVx1, potassium 40 POx1 (2020 03:20)    Surgery/Hospital course:  COVID 19 Positive   Tx CTU after being intubated for hypoxic respiratory failure    R pig tail for PTX     Today/Overnight:   Rapid response overnight, intubated   R pig tail placed for pneumothorax with good resolution   Had femoral A line and triple lumen place, cultures sent   Lactate 5 at time of rapid response, now improved to 1   Bun/Cr elevated, ; got 500 saline and 2 albumin with improvement in pressure     REVIEW OF SYSTEMS:  Unable to obtain, vented     Vital Signs Last 24 Hrs  T(C): 33.4 (2020 03:00), Max: 36.7 (2020 21:42)  T(F): 92.1 (2020 03:00), Max: 98 (2020 21:42)  HR: 62 (2020 06:00) (62 - 97)  BP: 132/62 (2020 01:15) (128/82 - 138/83)  BP(mean): 91 (2020 01:15) (87 - 91)  RR: 20 (2020 06:00) (10 - 35)  SpO2: 99% (2020 06:00) (93% - 100%)    ============================I/O===========================   I&O's Detail    2020 07:01  -  2020 07:00  --------------------------------------------------------  IN:    Albumin 5%  - 250 mL: 750 mL    dextrose 5%.: 350 mL    Enteral Tube Flush: 240 mL    IV PiggyBack: 300 mL    ketamine Infusion.: 30.6 mL    norepinephrine Infusion: 51.5 mL    Oral Fluid: 240 mL    propofol Infusion: 122.4 mL    rocuronium Infusion: 19.5 mL  Total IN: 2104 mL    OUT:    Indwelling Catheter - Urethral: 1675 mL  Total OUT: 1675 mL    Total NET: 429 mL        ============================ LABS =========================                        12.7   23.48 )-----------( 229      ( 2020 02:26 )             42.0     04-21    157<H>  |  109<H>  |  98<H>  ----------------------------<  260<H>  3.4<L>   |  34<H>  |  1.54<H>    Ca    10.1      2020 02:26  Phos  2.5       Mg     2.8         TPro  6.3  /  Alb  2.8<L>  /  TBili  0.7  /  DBili  x   /  AST  109<H>  /  ALT  317<H>  /  AlkPhos  123<H>      LIVER FUNCTIONS - ( 2020 02:26 )  Alb: 2.8 g/dL / Pro: 6.3 g/dL / ALK PHOS: 123 U/L / ALT: 317 U/L / AST: 109 U/L / GGT: x           PT/INR - ( 2020 02:26 )   PT: 12.8 sec;   INR: 1.11 ratio         PTT - ( 2020 02:26 )  PTT:27.3 sec  ABG - ( 2020 05:50 )  pH, Arterial: 7.46  pH, Blood: x     /  pCO2: 53    /  pO2: 78    / HCO3: 37    / Base Excess: 11.4  /  SaO2: 96                Urinalysis Basic - ( 2020 05:57 )    Color: Yellow / Appearance: Clear / S.024 / pH: x  Gluc: x / Ketone: Negative  / Bili: Negative / Urobili: Negative   Blood: x / Protein: Trace / Nitrite: Negative   Leuk Esterase: Negative / RBC: 1 /hpf / WBC 1 /HPF   Sq Epi: x / Non Sq Epi: 0 /hpf / Bacteria: Negative      ======================Micro/Rad/Cardio=================  Culture: Reviewed   CXR: Reviewed  ======================================================  PAST MEDICAL & SURGICAL HISTORY:  Prediabetes  Essential hypertension  H/O:     ====================ASSESSMENT ==============  COVID 19 Positive   Intubated   Hypoxic Respiratory failure     Plan:  ====================== NEUROLOGY=====================  Sedated with Ketamine, Propofol   Paralyzed with Rocuronium, continue until stable on FiO2  Dilaudid for analgesia     acetaminophen   Tablet .. 650 milliGRAM(s) Oral every 6 hours PRN Temp greater or equal to 38C (100.4F)  HYDROmorphone  Injectable 0.25 milliGRAM(s) IV Push every 8 hours PRN Moderate pain, anxiety, dyspnea  ketamine Infusion. 0.1 mG/kG/Hr (0.68 mL/Hr) IV Continuous <Continuous>  propofol Infusion 50 MICROgram(s)/kG/Min (20.4 mL/Hr) IV Continuous <Continuous>  rocuronium Infusion 8 MICROgram(s)/kG/Min (6.53 mL/Hr) IV Continuous <Continuous>    ==================== RESPIRATORY======================  On full vent support, wean FiO2 as tolerated  This AM, FiO2 60%->50%->70%, will attempt to bring down to 60    Mechanical Ventilation:  Mode: AC/ CMV (Assist Control/ Continuous Mandatory Ventilation)  RR (machine): 28  TV (machine): 320  FiO2: 60  PEEP: 5  ITime: 1  MAP: 14  PC: 38  PIP: 43    benzonatate 100 milliGRAM(s) Oral three times a day PRN Cough  diphenhydrAMINE   Injectable 50 milliGRAM(s) IV Push three times a day PRN Allergy symptoms  guaiFENesin   Syrup  (Sugar-Free) 200 milliGRAM(s) Oral four times a day PRN Cough    ====================CARDIOVASCULAR==================  On pressor support for hypotension     norepinephrine Infusion 0.05 MICROgram(s)/kG/Min (6.38 mL/Hr) IV Continuous <Continuous>    ===================HEMATOLOGIC/ONC ===================  VTE prophylaxis enoxaparin Injectable 60 milliGRAM(s) SubCutaneous every 12 hours    ===================== RENAL =========================  Continue monitoring urine output  Fluid resuscitation prn     ==================== GASTROINTESTINAL===================  dextrose 5% + sodium chloride 0.45%. 1000 milliLiter(s) (75 mL/Hr) IV Continuous <Continuous>  dextrose 5%. 1000 milliLiter(s) (50 mL/Hr) IV Continuous <Continuous>  GI prophylaxis, famotidine Injectable 20 milliGRAM(s) IV Push once PRN Infusion reaction  magnesium sulfate  IVPB 1 Gram(s) IV Intermittent once  potassium chloride   Solution 40 milliEquivalent(s) Oral once  potassium chloride  10 mEq/50 mL IVPB 10 milliEquivalent(s) IV Intermittent every 1 hour    =======================    ENDOCRINE  =====================  Continue to monitor blood sugar     allopurinol 300 milliGRAM(s) Oral daily    ========================INFECTIOUS DISEASE================  Completed COVID regimen and steroids  Convalescent Plasma on      Patient requires continuous monitoring with bedside rhythm monitoring, pulse ox monitoring, and intermittent blood gas analysis. Care plan discussed with ICU care team. Patient remained critical and at risk for life threatening decompensation.    By signing my name below, I, Jo Ann Lombardo, attest that this documentation has been prepared under the direction and in the presence of MYLENE Urias.  Electronically signed: Beronica Wetzel, 20 @ 07:26    I, Bradley Dick, personally performed the services described in this documentation. all medical record entries made by the beronica were at my direction and in my presence. I have reviewed the chart and agree that the record reflects my personal performance and is accurate and complete  Electronically signed: MYLENE Urias KIMBERLY KUMAR  MRN-[id number]  Patient is a 69y old  Female who presents with a chief complaint of 69F p/w sob and fever (2020 03:08)    HPI:  Portions of this History and Physical Exam are adopted from ER Provider Notation per [facility] policy to limit healthcare provider exposure during COVID19 Pandemic    Unable to reach patient in ED via phone. Called the patient's  Mr. Cooper Kumar to obtain additional history.  Adopted from ED notation:   HPI Objective Statement: 70 yo F with PMhx of HTN and prediabetes presents to the ED with complaints of fever, chills, dry cough and worsening SOB. Patient reports that she has been feeling febrile for the last 2 weeks. She has intermittent chills and dry cough. She feels chest pressure when she coughs. Her SOB has been worsening. She becomes short of breath when she walks to the bathroom from the bed. She also reports to have orthopnea and LE edema. She has been sleeping in the chair for the last couple of days. She denies any recent travel. She went to [place] before her symptoms and thinks that she was exposed there. She reports to have HTN and prediabetes. She had C-sectionsx2 in the past. No family hx of heart or lung disease. Lives with her . Able to take care of ADLs. No hx of smoking or chronic alcohol consumptions. Allergic to neuomycin-develops rash and swelling.  PCP: Dr. Hugh Pinedo    In the ED, .2, 158/89, 86->111, 18 100% NRB  s/p [id number] IVx1, potassium 40 POx1 (2020 03:20)    Surgery/Hospital course:  COVID 19 Positive   Tx CTU after being intubated for hypoxic respiratory failure    R pig tail for PTX     Today/Overnight:   Rapid response overnight, intubated   R pig tail placed for pneumothorax with good resolution   Had femoral A line and triple lumen place, cultures sent   Lactate 5 at time of rapid response, now improved to 1   Bun/Cr elevated, ; got 500 saline and 2 albumin with improvement in pressure     REVIEW OF SYSTEMS:  Unable to obtain, vented     Vital Signs Last 24 Hrs  T(C): 33.4 (2020 03:00), Max: 36.7 (2020 21:42)  T(F): 92.1 (2020 03:00), Max: 98 (2020 21:42)  HR: 62 (2020 06:00) (62 - 97)  BP: 132/62 (2020 01:15) (128/82 - 138/83)  BP(mean): 91 (2020 01:15) (87 - 91)  RR: 20 (2020 06:00) (10 - 35)  SpO2: 99% (2020 06:00) (93% - 100%)    ============================I/O===========================   I&O's Detail    2020 07:01  -  2020 07:00  --------------------------------------------------------  IN:    Albumin 5%  - 250 mL: 750 mL    dextrose 5%.: 350 mL    Enteral Tube Flush: 240 mL    IV PiggyBack: 300 mL    ketamine Infusion.: 30.6 mL    norepinephrine Infusion: 51.5 mL    Oral Fluid: 240 mL    propofol Infusion: 122.4 mL    rocuronium Infusion: 19.5 mL  Total IN: 2104 mL    OUT:    Indwelling Catheter - Urethral: 1675 mL  Total OUT: 1675 mL    Total NET: 429 mL        ============================ LABS =========================                        12.7   23.48 )-----------( 229      ( 2020 02:26 )             42.0     04-21    157<H>  |  109<H>  |  98<H>  ----------------------------<  260<H>  3.4<L>   |  34<H>  |  1.54<H>    Ca    10.1      2020 02:26  Phos  2.5       Mg     2.8         TPro  6.3  /  Alb  2.8<L>  /  TBili  0.7  /  DBili  x   /  AST  109<H>  /  ALT  317<H>  /  AlkPhos  123<H>      LIVER FUNCTIONS - ( 2020 02:26 )  Alb: 2.8 g/dL / Pro: 6.3 g/dL / ALK PHOS: 123 U/L / ALT: 317 U/L / AST: 109 U/L / GGT: x           PT/INR - ( 2020 02:26 )   PT: 12.8 sec;   INR: 1.11 ratio         PTT - ( 2020 02:26 )  PTT:27.3 sec  ABG - ( 2020 05:50 )  pH, Arterial: 7.46  pH, Blood: x     /  pCO2: 53    /  pO2: 78    / HCO3: 37    / Base Excess: 11.4  /  SaO2: 96                Urinalysis Basic - ( 2020 05:57 )    Color: Yellow / Appearance: Clear / S.024 / pH: x  Gluc: x / Ketone: Negative  / Bili: Negative / Urobili: Negative   Blood: x / Protein: Trace / Nitrite: Negative   Leuk Esterase: Negative / RBC: 1 /hpf / WBC 1 /HPF   Sq Epi: x / Non Sq Epi: 0 /hpf / Bacteria: Negative      ======================Micro/Rad/Cardio=================  Culture: Reviewed   CXR: Reviewed  ======================================================  PAST MEDICAL & SURGICAL HISTORY:  Prediabetes  Essential hypertension  H/O:     ====================ASSESSMENT ==============  COVID 19 Positive   Intubated   Hypoxic Respiratory failure     Plan:  ====================== NEUROLOGY=====================  Sedated with Ketamine, Propofol   Paralyzed with Rocuronium, continue until stable on FiO2  Dilaudid for analgesia     acetaminophen   Tablet .. 650 milliGRAM(s) Oral every 6 hours PRN Temp greater or equal to 38C (100.4F)  HYDROmorphone  Injectable 0.25 milliGRAM(s) IV Push every 8 hours PRN Moderate pain, anxiety, dyspnea  ketamine Infusion. 0.1 mG/kG/Hr (0.68 mL/Hr) IV Continuous <Continuous>  propofol Infusion 50 MICROgram(s)/kG/Min (20.4 mL/Hr) IV Continuous <Continuous>  rocuronium Infusion 8 MICROgram(s)/kG/Min (6.53 mL/Hr) IV Continuous <Continuous>    ==================== RESPIRATORY======================  On full vent support, wean FiO2 as tolerated  This AM, FiO2 60%->50%->70%, will attempt to bring down to 60    Mechanical Ventilation:  Mode: AC/ CMV (Assist Control/ Continuous Mandatory Ventilation)  RR (machine): 28  TV (machine): 320  FiO2: 60  PEEP: 5  ITime: 1  MAP: 14  PC: 38  PIP: 43    benzonatate 100 milliGRAM(s) Oral three times a day PRN Cough  diphenhydrAMINE   Injectable 50 milliGRAM(s) IV Push three times a day PRN Allergy symptoms  guaiFENesin   Syrup  (Sugar-Free) 200 milliGRAM(s) Oral four times a day PRN Cough    ====================CARDIOVASCULAR==================  On pressor support for hypotension with sedation    norepinephrine Infusion 0.05 MICROgram(s)/kG/Min (6.38 mL/Hr) IV Continuous <Continuous>    ===================HEMATOLOGIC/ONC ===================  VTE prophylaxis enoxaparin Injectable 60 milliGRAM(s) SubCutaneous every 12 hours    ===================== RENAL =========================  Continue monitoring urine output  Fluid resuscitation prn   Bun/Cr 98/1.54  I/o 2.1/1.6  UO 1.6  ==================== GASTROINTESTINAL===================  dextrose 5% + sodium chloride 0.45%. 1000 milliLiter(s) (75 mL/Hr) IV Continuous <Continuous>  dextrose 5%. 1000 milliLiter(s) (50 mL/Hr) IV Continuous <Continuous>  GI prophylaxis, famotidine Injectable 20 milliGRAM(s) IV Push once PRN Infusion reaction  magnesium sulfate  IVPB 1 Gram(s) IV Intermittent once  potassium chloride   Solution 40 milliEquivalent(s) Oral once  potassium chloride  10 mEq/50 mL IVPB 10 milliEquivalent(s) IV Intermittent every 1 hour    =======================    ENDOCRINE  =====================  Continue to monitor blood sugar     ISS, FS q 6hours   allopurinol 300 milliGRAM(s) Oral daily    ========================INFECTIOUS DISEASE================  Completed COVID regimen and steroids  Convalescent Plasma on      Patient requires continuous monitoring with bedside rhythm monitoring, pulse ox monitoring, and intermittent blood gas analysis. Care plan discussed with ICU care team. Patient remained critical and at risk for life threatening decompensation.    By signing my name below, I, Jo Ann Lombardo, attest that this documentation has been prepared under the direction and in the presence of MYLENE Urias.  Electronically signed: Xin Wetzel, 20 @ 07:26    I, Bradley Dick, personally performed the services described in this documentation. all medical record entries made by the alejandroibapolonia were at my direction and in my presence. I have reviewed the chart and agree that the record reflects my personal performance and is accurate and complete  Electronically signed: MYLENE Urias

## 2020-04-21 NOTE — PROGRESS NOTE ADULT - ASSESSMENT
Called into [facility] 70 yo F with PMhx of HTN, HFpEF prediabetes presents to the ED with complaints of fever, chills, dry cough and worsening SOB admit to medicine for acute hypoxic respiratory failure 2/2 COVID-19 pneumonia. Patient intubated overnight due to worsening hypoxia. Moved to the ICU for further care

## 2020-04-21 NOTE — PROCEDURE NOTE - NSPROCDETAILS_GEN_ALL_CORE
catheter inserted over needle/Seldinger technique/location identified, draped/prepped, sterile technique used, needle inserted/introduced
lumen(s) aspirated and flushed/sterile dressing applied/guidewire recovered/ultrasound guidance/sterile technique, catheter placed

## 2020-04-21 NOTE — PROVIDER CONTACT NOTE (CHANGE IN STATUS NOTIFICATION) - ACTION/TREATMENT ORDERED:
MD aware. Tylenol PO given.
MD aware, MD to bedside for further assessment. Call RRT, continue to monitor.

## 2020-04-22 LAB
-  COAGULASE NEGATIVE STAPHYLOCOCCUS: SIGNIFICANT CHANGE UP
ALBUMIN SERPL ELPH-MCNC: 2.7 G/DL — LOW (ref 3.3–5)
ALBUMIN SERPL ELPH-MCNC: 3.1 G/DL — LOW (ref 3.3–5)
ALBUMIN SERPL ELPH-MCNC: 3.2 G/DL — LOW (ref 3.3–5)
ALP SERPL-CCNC: 84 U/L — SIGNIFICANT CHANGE UP (ref 40–120)
ALP SERPL-CCNC: 90 U/L — SIGNIFICANT CHANGE UP (ref 40–120)
ALP SERPL-CCNC: 93 U/L — SIGNIFICANT CHANGE UP (ref 40–120)
ALT FLD-CCNC: 148 U/L — HIGH (ref 10–45)
ALT FLD-CCNC: 149 U/L — HIGH (ref 10–45)
ALT FLD-CCNC: 175 U/L — HIGH (ref 10–45)
ANION GAP SERPL CALC-SCNC: 14 MMOL/L — SIGNIFICANT CHANGE UP (ref 5–17)
ANION GAP SERPL CALC-SCNC: 14 MMOL/L — SIGNIFICANT CHANGE UP (ref 5–17)
ANION GAP SERPL CALC-SCNC: 6 MMOL/L — SIGNIFICANT CHANGE UP (ref 5–17)
AST SERPL-CCNC: 53 U/L — HIGH (ref 10–40)
AST SERPL-CCNC: 57 U/L — HIGH (ref 10–40)
AST SERPL-CCNC: 76 U/L — HIGH (ref 10–40)
BILIRUB SERPL-MCNC: 0.4 MG/DL — SIGNIFICANT CHANGE UP (ref 0.2–1.2)
BILIRUB SERPL-MCNC: 0.5 MG/DL — SIGNIFICANT CHANGE UP (ref 0.2–1.2)
BILIRUB SERPL-MCNC: 0.6 MG/DL — SIGNIFICANT CHANGE UP (ref 0.2–1.2)
BUN SERPL-MCNC: 82 MG/DL — HIGH (ref 7–23)
BUN SERPL-MCNC: 83 MG/DL — HIGH (ref 7–23)
BUN SERPL-MCNC: 83 MG/DL — HIGH (ref 7–23)
CALCIUM SERPL-MCNC: 9.1 MG/DL — SIGNIFICANT CHANGE UP (ref 8.4–10.5)
CALCIUM SERPL-MCNC: 9.1 MG/DL — SIGNIFICANT CHANGE UP (ref 8.4–10.5)
CALCIUM SERPL-MCNC: 9.5 MG/DL — SIGNIFICANT CHANGE UP (ref 8.4–10.5)
CHLORIDE SERPL-SCNC: 111 MMOL/L — HIGH (ref 96–108)
CHLORIDE SERPL-SCNC: 114 MMOL/L — HIGH (ref 96–108)
CHLORIDE SERPL-SCNC: 119 MMOL/L — HIGH (ref 96–108)
CO2 SERPL-SCNC: 24 MMOL/L — SIGNIFICANT CHANGE UP (ref 22–31)
CO2 SERPL-SCNC: 26 MMOL/L — SIGNIFICANT CHANGE UP (ref 22–31)
CO2 SERPL-SCNC: 36 MMOL/L — HIGH (ref 22–31)
CREAT SERPL-MCNC: 1.81 MG/DL — HIGH (ref 0.5–1.3)
CREAT SERPL-MCNC: 2.01 MG/DL — HIGH (ref 0.5–1.3)
CREAT SERPL-MCNC: 2.1 MG/DL — HIGH (ref 0.5–1.3)
GAS PNL BLDA: SIGNIFICANT CHANGE UP
GLUCOSE BLDC GLUCOMTR-MCNC: 305 MG/DL — HIGH (ref 70–99)
GLUCOSE SERPL-MCNC: 139 MG/DL — HIGH (ref 70–99)
GLUCOSE SERPL-MCNC: 318 MG/DL — HIGH (ref 70–99)
GLUCOSE SERPL-MCNC: 348 MG/DL — HIGH (ref 70–99)
GRAM STN FLD: SIGNIFICANT CHANGE UP
HCT VFR BLD CALC: 32.1 % — LOW (ref 34.5–45)
HGB BLD-MCNC: 8.9 G/DL — LOW (ref 11.5–15.5)
MAGNESIUM SERPL-MCNC: 3.2 MG/DL — HIGH (ref 1.6–2.6)
MCHC RBC-ENTMCNC: 27.7 GM/DL — LOW (ref 32–36)
MCHC RBC-ENTMCNC: 30.8 PG — SIGNIFICANT CHANGE UP (ref 27–34)
MCV RBC AUTO: 111.1 FL — HIGH (ref 80–100)
METHOD TYPE: SIGNIFICANT CHANGE UP
NRBC # BLD: 0 /100 WBCS — SIGNIFICANT CHANGE UP (ref 0–0)
PHOSPHATE SERPL-MCNC: 7 MG/DL — HIGH (ref 2.5–4.5)
PLATELET # BLD AUTO: 162 K/UL — SIGNIFICANT CHANGE UP (ref 150–400)
POTASSIUM SERPL-MCNC: 4.7 MMOL/L — SIGNIFICANT CHANGE UP (ref 3.5–5.3)
POTASSIUM SERPL-MCNC: 5.9 MMOL/L — HIGH (ref 3.5–5.3)
POTASSIUM SERPL-MCNC: 5.9 MMOL/L — HIGH (ref 3.5–5.3)
POTASSIUM SERPL-SCNC: 4.7 MMOL/L — SIGNIFICANT CHANGE UP (ref 3.5–5.3)
POTASSIUM SERPL-SCNC: 5.9 MMOL/L — HIGH (ref 3.5–5.3)
POTASSIUM SERPL-SCNC: 5.9 MMOL/L — HIGH (ref 3.5–5.3)
PROCALCITONIN SERPL-MCNC: 0.65 NG/ML — HIGH (ref 0.02–0.1)
PROT SERPL-MCNC: 5.3 G/DL — LOW (ref 6–8.3)
PROT SERPL-MCNC: 5.6 G/DL — LOW (ref 6–8.3)
PROT SERPL-MCNC: 5.9 G/DL — LOW (ref 6–8.3)
RBC # BLD: 2.89 M/UL — LOW (ref 3.8–5.2)
RBC # FLD: 15 % — HIGH (ref 10.3–14.5)
SODIUM SERPL-SCNC: 151 MMOL/L — HIGH (ref 135–145)
SODIUM SERPL-SCNC: 152 MMOL/L — HIGH (ref 135–145)
SODIUM SERPL-SCNC: 161 MMOL/L — CRITICAL HIGH (ref 135–145)
SPECIMEN SOURCE: SIGNIFICANT CHANGE UP
TRIGL SERPL-MCNC: 173 MG/DL — HIGH (ref 10–149)
WBC # BLD: 17.2 K/UL — HIGH (ref 3.8–10.5)
WBC # FLD AUTO: 17.2 K/UL — HIGH (ref 3.8–10.5)

## 2020-04-22 PROCEDURE — 99291 CRITICAL CARE FIRST HOUR: CPT | Mod: CS

## 2020-04-22 PROCEDURE — 99292 CRITICAL CARE ADDL 30 MIN: CPT | Mod: CS

## 2020-04-22 RX ORDER — SODIUM CHLORIDE 9 MG/ML
1000 INJECTION, SOLUTION INTRAVENOUS
Refills: 0 | Status: DISCONTINUED | OUTPATIENT
Start: 2020-04-22 | End: 2020-04-23

## 2020-04-22 RX ORDER — INSULIN HUMAN 100 [IU]/ML
10 INJECTION, SOLUTION SUBCUTANEOUS ONCE
Refills: 0 | Status: COMPLETED | OUTPATIENT
Start: 2020-04-22 | End: 2020-04-22

## 2020-04-22 RX ORDER — DEXTROSE 50 % IN WATER 50 %
25 SYRINGE (ML) INTRAVENOUS ONCE
Refills: 0 | Status: COMPLETED | OUTPATIENT
Start: 2020-04-22 | End: 2020-04-22

## 2020-04-22 RX ORDER — SODIUM CHLORIDE 9 MG/ML
250 INJECTION, SOLUTION INTRAVENOUS
Refills: 0 | Status: DISCONTINUED | OUTPATIENT
Start: 2020-04-22 | End: 2020-04-22

## 2020-04-22 RX ORDER — FUROSEMIDE 40 MG
40 TABLET ORAL ONCE
Refills: 0 | Status: COMPLETED | OUTPATIENT
Start: 2020-04-22 | End: 2020-04-22

## 2020-04-22 RX ORDER — VANCOMYCIN HCL 1 G
1000 VIAL (EA) INTRAVENOUS ONCE
Refills: 0 | Status: COMPLETED | OUTPATIENT
Start: 2020-04-22 | End: 2020-04-22

## 2020-04-22 RX ORDER — SODIUM CHLORIDE 9 MG/ML
500 INJECTION INTRAMUSCULAR; INTRAVENOUS; SUBCUTANEOUS ONCE
Refills: 0 | Status: COMPLETED | OUTPATIENT
Start: 2020-04-22 | End: 2020-04-22

## 2020-04-22 RX ORDER — HYDROMORPHONE HYDROCHLORIDE 2 MG/ML
0.5 INJECTION INTRAMUSCULAR; INTRAVENOUS; SUBCUTANEOUS EVERY 4 HOURS
Refills: 0 | Status: DISCONTINUED | OUTPATIENT
Start: 2020-04-22 | End: 2020-04-23

## 2020-04-22 RX ORDER — FUROSEMIDE 40 MG
10 TABLET ORAL
Qty: 500 | Refills: 0 | Status: DISCONTINUED | OUTPATIENT
Start: 2020-04-22 | End: 2020-04-23

## 2020-04-22 RX ORDER — INSULIN HUMAN 100 [IU]/ML
5 INJECTION, SOLUTION SUBCUTANEOUS ONCE
Refills: 0 | Status: COMPLETED | OUTPATIENT
Start: 2020-04-22 | End: 2020-04-22

## 2020-04-22 RX ORDER — SEVELAMER CARBONATE 2400 MG/1
800 POWDER, FOR SUSPENSION ORAL EVERY 8 HOURS
Refills: 0 | Status: DISCONTINUED | OUTPATIENT
Start: 2020-04-22 | End: 2020-04-22

## 2020-04-22 RX ORDER — SEVELAMER CARBONATE 2400 MG/1
800 POWDER, FOR SUSPENSION ORAL THREE TIMES A DAY
Refills: 0 | Status: DISCONTINUED | OUTPATIENT
Start: 2020-04-22 | End: 2020-04-23

## 2020-04-22 RX ORDER — SODIUM CHLORIDE 9 MG/ML
250 INJECTION, SOLUTION INTRAVENOUS
Refills: 0 | Status: DISCONTINUED | OUTPATIENT
Start: 2020-04-22 | End: 2020-04-23

## 2020-04-22 RX ORDER — CHLORHEXIDINE GLUCONATE 213 G/1000ML
15 SOLUTION TOPICAL EVERY 12 HOURS
Refills: 0 | Status: DISCONTINUED | OUTPATIENT
Start: 2020-04-22 | End: 2020-04-23

## 2020-04-22 RX ORDER — SODIUM POLYSTYRENE SULFONATE 4.1 MEQ/G
30 POWDER, FOR SUSPENSION ORAL ONCE
Refills: 0 | Status: COMPLETED | OUTPATIENT
Start: 2020-04-22 | End: 2020-04-22

## 2020-04-22 RX ADMIN — INSULIN HUMAN 10 UNIT(S): 100 INJECTION, SOLUTION SUBCUTANEOUS at 01:38

## 2020-04-22 RX ADMIN — Medication 25 MILLILITER(S): at 04:47

## 2020-04-22 RX ADMIN — ENOXAPARIN SODIUM 60 MILLIGRAM(S): 100 INJECTION SUBCUTANEOUS at 11:52

## 2020-04-22 RX ADMIN — CHLORHEXIDINE GLUCONATE 1 APPLICATION(S): 213 SOLUTION TOPICAL at 04:41

## 2020-04-22 RX ADMIN — CHLORHEXIDINE GLUCONATE 15 MILLILITER(S): 213 SOLUTION TOPICAL at 17:57

## 2020-04-22 RX ADMIN — Medication 25 MILLILITER(S): at 08:49

## 2020-04-22 RX ADMIN — SODIUM CHLORIDE 250 MILLILITER(S): 9 INJECTION, SOLUTION INTRAVENOUS at 15:00

## 2020-04-22 RX ADMIN — SODIUM POLYSTYRENE SULFONATE 30 GRAM(S): 4.1 POWDER, FOR SUSPENSION ORAL at 07:21

## 2020-04-22 RX ADMIN — LATANOPROST 1 DROP(S): 0.05 SOLUTION/ DROPS OPHTHALMIC; TOPICAL at 21:00

## 2020-04-22 RX ADMIN — Medication 250 MILLIGRAM(S): at 06:17

## 2020-04-22 RX ADMIN — Medication 25 MILLILITER(S): at 00:39

## 2020-04-22 RX ADMIN — INSULIN HUMAN 10 UNIT(S): 100 INJECTION, SOLUTION SUBCUTANEOUS at 03:11

## 2020-04-22 RX ADMIN — INSULIN HUMAN 5 UNIT(S): 100 INJECTION, SOLUTION SUBCUTANEOUS at 12:39

## 2020-04-22 RX ADMIN — Medication 40 MILLIGRAM(S): at 15:00

## 2020-04-22 RX ADMIN — CHLORHEXIDINE GLUCONATE 15 MILLILITER(S): 213 SOLUTION TOPICAL at 04:41

## 2020-04-22 RX ADMIN — DORZOLAMIDE HYDROCHLORIDE TIMOLOL MALEATE 1 DROP(S): 20; 5 SOLUTION/ DROPS OPHTHALMIC at 17:57

## 2020-04-22 RX ADMIN — Medication 300 MILLIGRAM(S): at 17:56

## 2020-04-22 RX ADMIN — INSULIN HUMAN 10 UNIT(S): 100 INJECTION, SOLUTION SUBCUTANEOUS at 08:30

## 2020-04-22 RX ADMIN — Medication 8: at 18:30

## 2020-04-22 RX ADMIN — SODIUM CHLORIDE 1000 MILLILITER(S): 9 INJECTION INTRAMUSCULAR; INTRAVENOUS; SUBCUTANEOUS at 01:39

## 2020-04-22 RX ADMIN — HYDROMORPHONE HYDROCHLORIDE 0.5 MILLIGRAM(S): 2 INJECTION INTRAMUSCULAR; INTRAVENOUS; SUBCUTANEOUS at 05:21

## 2020-04-22 RX ADMIN — Medication 25 MILLILITER(S): at 03:11

## 2020-04-22 RX ADMIN — SODIUM CHLORIDE 100 MILLILITER(S): 9 INJECTION, SOLUTION INTRAVENOUS at 15:59

## 2020-04-22 RX ADMIN — ENOXAPARIN SODIUM 60 MILLIGRAM(S): 100 INJECTION SUBCUTANEOUS at 21:00

## 2020-04-22 NOTE — PROGRESS NOTE ADULT - SUBJECTIVE AND OBJECTIVE BOX
KIMBERLY KUMAR  MRN-[id number]  Patient is a 69y old  Female who presents with a chief complaint of 69F p/w sob and fever (2020 17:18)    HPI:  Portions of this History and Physical Exam are adopted from ER Provider Notation per [facility] policy to limit healthcare provider exposure during COVID19 Pandemic    Unable to reach patient in ED via phone. Called the patient's  Mr. Cooper Kumar to obtain additional history.  Adopted from ED notation:   HPI Objective Statement: 68 yo F with PMhx of HTN and prediabetes presents to the ED with complaints of fever, chills, dry cough and worsening SOB. Patient reports that she has been feeling febrile for the last 2 weeks. She has intermittent chills and dry cough. She feels chest pressure when she coughs. Her SOB has been worsening. She becomes short of breath when she walks to the bathroom from the bed. She also reports to have orthopnea and LE edema. She has been sleeping in the chair for the last couple of days. She denies any recent travel. She went to [employer] before her symptoms and thinks that she was exposed there. She reports to have HTN and prediabetes. She had C-sectionsx2 in the past. No family hx of heart or lung disease. Lives with her . Able to take care of ADLs. No hx of smoking or chronic alcohol consumptions. Allergic to neuomycin-develops rash and swelling.  PCP: Dr. Hugh Pinedo    In the ED, .2, 158/89, 86->111, 18 100% NRB  s/p [id number] IVx1, potassium 40 POx1 (2020 03:20)      Surgery/Hospital course:  COVID 19 Positive   Tx CTU after being intubated for hypoxic respiratory failure    R pig tail for PTX     Today:      ============================I/O===========================   I&O's Detail    2020 07:01  -  2020 07:00  --------------------------------------------------------  IN:    Albumin 5%  - 250 mL: 500 mL    dextrose 5%.: 250 mL    Enteral Tube Flush: 100 mL    Free Water: 250 mL    IV PiggyBack: 250 mL    ketamine Infusion.: 244.8 mL    Nepro with Carb Steady: 500 mL    norepinephrine Infusion: 257.9 mL    propofol Infusion: 332.7 mL    rocuronium Infusion: 156 mL    sodium chloride 0.9%: 70 mL    Sodium Chloride 0.9% IV Bolus: 1500 mL    vasopressin Infusion: 54 mL  Total IN: 4465.4 mL    OUT:    Chest Tube: 30 mL    Indwelling Catheter - Urethral: 825 mL  Total OUT: 855 mL    Total NET: 3610.4 mL      2020 07:01  -  2020 20:16  --------------------------------------------------------  IN:    dextrose 5%.: 1050 mL    Free Water: 250 mL    furosemide Infusion: 10 mL    ketamine Infusion.: 142.8 mL    norepinephrine Infusion: 153.3 mL    propofol Infusion: 185.9 mL    rocuronium Infusion: 13 mL    vasopressin Infusion: 24 mL  Total IN: 1829 mL    OUT:    Chest Tube: 35 mL    Indwelling Catheter - Urethral: 1090 mL  Total OUT: 1125 mL    Total NET: 704 mL        ============================ LABS =========================                        8.9    17.20 )-----------( 162      ( 2020 01:32 )             32.1     -    151<H>  |  111<H>  |  83<H>  ----------------------------<  348<H>  4.7   |  26  |  2.10<H>    Ca    9.1      2020 16:47  Phos  7.0       Mg     3.2         TPro  5.6<L>  /  Alb  3.1<L>  /  TBili  0.6  /  DBili  x   /  AST  53<H>  /  ALT  149<H>  /  AlkPhos  93  04-22    LIVER FUNCTIONS - ( 2020 16:47 )  Alb: 3.1 g/dL / Pro: 5.6 g/dL / ALK PHOS: 93 U/L / ALT: 149 U/L / AST: 53 U/L / GGT: x           PT/INR - ( 2020 02:26 )   PT: 12.8 sec;   INR: 1.11 ratio         PTT - ( 2020 02:26 )  PTT:27.3 sec  ABG - ( 2020 16:41 )  pH, Arterial: 7.28  pH, Blood: x     /  pCO2: 63    /  pO2: 67    / HCO3: 29    / Base Excess: 1.8   /  SaO2: 94                Urinalysis Basic - ( 2020 05:57 )    Color: Yellow / Appearance: Clear / S.024 / pH: x  Gluc: x / Ketone: Negative  / Bili: Negative / Urobili: Negative   Blood: x / Protein: Trace / Nitrite: Negative   Leuk Esterase: Negative / RBC: 1 /hpf / WBC 1 /HPF   Sq Epi: x / Non Sq Epi: 0 /hpf / Bacteria: Negative      ======================Micro/Rad/Cardio=================  Culture: Reviewed   CXR: Reviewed  ======================================================  PAST MEDICAL & SURGICAL HISTORY:  Prediabetes  Essential hypertension  H/O:     ====================ASSESSMENT ==============  COVID 19 Positive   Intubated   Hypoxic Respiratory failure     Do not escalate care       Plan:  ====================== NEUROLOGY=====================  Sedated with Ketamine, Propofol, Dilaudid; plan to optimize sedation for patient comfort   Paralyzed with Rocuronium  Continue to wean sedation/paralytics as tolerated    acetaminophen   Tablet .. 650 milliGRAM(s) Oral every 6 hours PRN Temp greater or equal to 38C (100.4F)  HYDROmorphone  Injectable 0.5 milliGRAM(s) IV Push every 4 hours PRN Moderate Pain (4 - 6)  ketamine Infusion. 0.1 mG/kG/Hr (0.68 mL/Hr) IV Continuous <Continuous>  propofol Infusion 50 MICROgram(s)/kG/Min (20.4 mL/Hr) IV Continuous <Continuous>  rocuronium Infusion 8 MICROgram(s)/kG/Min (6.53 mL/Hr) IV Continuous <Continuous>    ==================== RESPIRATORY======================  On full vent support  Continue to wean FiO2 (goal to 30%, then wean PEEP as tolerated)  O2 sat goal >92%    Mechanical Ventilation:  Mode: AC/ CMV (Assist Control/ Continuous Mandatory Ventilation)  RR (machine): 40  FiO2: 70  PEEP: 5  ITime: 0.45  MAP: 17  PC: 38  PIP: 45    ====================CARDIOVASCULAR==================  On pressor support for hypotension  Wean vasopressors as tolerated, MAP goal 65-75    norepinephrine Infusion 0.05 MICROgram(s)/kG/Min (6.38 mL/Hr) IV Continuous <Continuous>  vasopressin Infusion 0.1 Unit(s)/Min (6 mL/Hr) IV Continuous <Continuous>    ===================HEMATOLOGIC/ONC ===================  Continue Lovenox for VTE prophylaxis    enoxaparin Injectable 60 milliGRAM(s) SubCutaneous every 12 hours    ===================== RENAL =========================  Continue monitoring urine output  Monitor BUN/SCr  Monitor and replete electrolytes prn    Continue to diurese as tolerated with Lasix  furosemide Infusion 10 mG/Hr (5 mL/Hr) IV Continuous <Continuous>    ==================== GASTROINTESTINAL===================  Tube held 2/2 proned FW held  Continue pepcid for GI prophylaxis    dextrose 5% + sodium chloride 0.45%. 250 milliLiter(s) (250 mL/Hr) IV Continuous <Continuous>  dextrose 5%. 1000 milliLiter(s) (100 mL/Hr) IV Continuous <Continuous>  famotidine Injectable 20 milliGRAM(s) IV Push once PRN Infusion reaction  sodium chloride 0.9% lock flush 10 milliLiter(s) IV Push every 1 hour PRN Pre/post blood products, medications, blood draw, and to maintain line patency    =======================    ENDOCRINE  =====================  Glucose control,    allopurinol 300 milliGRAM(s) Oral daily  dexAMETHasone  IVPB 20 milliGRAM(s) IV Intermittent daily  insulin lispro (HumaLOG) corrective regimen sliding scale   SubCutaneous every 6 hours    ========================INFECTIOUS DISEASE================  Completed COVID regimen and steroids  Convalescent Plasma on    Blood cultures with gram positive cocci, received Vanco x1 this AM  Will check trough for dosing given rising creatine   Decadron started for decompensation last night       Poor prognosis, do not escalate care as per family discussion last night.    Patient requires continuous monitoring with bedside rhythm monitoring, arterial line, pulse oximetry, ventilator monitoring and intermittent blood gas analysis.  Care plan discussed with ICU care team.  Patient remained critical; required more than usual ICU care team; I have spent 35 minutes providing non-routine ICU care, revaluated multiple times during the day.    By signing my name below, I, Rebecca Charles, attest that this documentation has been prepared under the direction and in the presence of Dorothy Melgar PA.  Electronically signed: Xin Montano, 04-22-20 @ 20:16    I, Dorothy Melgar, personally performed the services described in this documentation. all medical record entries made by the scribe were at my direction and in my presence. I have reviewed the chart and agree that the record reflects my personal performance and is accurate and complete  Electronically signed: Dorothy Melgar PA. 20 @ 20:16 KIMBERLY KUMAR  MRN-[id number]  Patient is a 69y old  Female who presents with a chief complaint of 69F p/w sob and fever (2020 17:18)    HPI:  Portions of this History and Physical Exam are adopted from ER Provider Notation per [facility] policy to limit healthcare provider exposure during COVID19 Pandemic    Unable to reach patient in ED via phone. Called the patient's  Mr. Cooper Kumar to obtain additional history.  Adopted from ED notation:   HPI Objective Statement: 70 yo F with PMhx of HTN and prediabetes presents to the ED with complaints of fever, chills, dry cough and worsening SOB. Patient reports that she has been feeling febrile for the last 2 weeks. She has intermittent chills and dry cough. She feels chest pressure when she coughs. Her SOB has been worsening. She becomes short of breath when she walks to the bathroom from the bed. She also reports to have orthopnea and LE edema. She has been sleeping in the chair for the last couple of days. She denies any recent travel. She went to [place] before her symptoms and thinks that she was exposed there. She reports to have HTN and prediabetes. She had C-sectionsx2 in the past. No family hx of heart or lung disease. Lives with her . Able to take care of ADLs. No hx of smoking or chronic alcohol consumptions. Allergic to neuomycin-develops rash and swelling.  PCP: Dr. Hugh Pinedo    In the ED, .2, 158/89, 86->111, 18 100% NRB  s/p [id number] IVx1, potassium 40 POx1 (2020 03:20)      Surgery/Hospital course:  COVID 19 Positive   Tx CTU after being intubated for hypoxic respiratory failure    R pig tail for PTX    Supine with improvement in ABG, responding to lasix gtt    Today:  Supine with improvement in ABG, responding to lasix gtt    ============================I/O===========================   I&O's Detail    2020 07:01  -  2020 07:00  --------------------------------------------------------  IN:    Albumin 5%  - 250 mL: 500 mL    dextrose 5%.: 250 mL    Enteral Tube Flush: 100 mL    Free Water: 250 mL    IV PiggyBack: 250 mL    ketamine Infusion.: 244.8 mL    Nepro with Carb Steady: 500 mL    norepinephrine Infusion: 257.9 mL    propofol Infusion: 332.7 mL    rocuronium Infusion: 156 mL    sodium chloride 0.9%: 70 mL    Sodium Chloride 0.9% IV Bolus: 1500 mL    vasopressin Infusion: 54 mL  Total IN: 4465.4 mL    OUT:    Chest Tube: 30 mL    Indwelling Catheter - Urethral: 825 mL  Total OUT: 855 mL    Total NET: 3610.4 mL      2020 07:01  -  2020 20:16  --------------------------------------------------------  IN:    dextrose 5%.: 1050 mL    Free Water: 250 mL    furosemide Infusion: 10 mL    ketamine Infusion.: 142.8 mL    norepinephrine Infusion: 153.3 mL    propofol Infusion: 185.9 mL    rocuronium Infusion: 13 mL    vasopressin Infusion: 24 mL  Total IN: 1829 mL    OUT:    Chest Tube: 35 mL    Indwelling Catheter - Urethral: 1090 mL  Total OUT: 1125 mL    Total NET: 704 mL        ============================ LABS =========================                        8.9    17.20 )-----------( 162      ( 2020 01:32 )             32.1     04-    151<H>  |  111<H>  |  83<H>  ----------------------------<  348<H>  4.7   |  26  |  2.10<H>    Ca    9.1      2020 16:47  Phos  7.0       Mg     3.2         TPro  5.6<L>  /  Alb  3.1<L>  /  TBili  0.6  /  DBili  x   /  AST  53<H>  /  ALT  149<H>  /  AlkPhos  93      LIVER FUNCTIONS - ( 2020 16:47 )  Alb: 3.1 g/dL / Pro: 5.6 g/dL / ALK PHOS: 93 U/L / ALT: 149 U/L / AST: 53 U/L / GGT: x           PT/INR - ( 2020 02:26 )   PT: 12.8 sec;   INR: 1.11 ratio         PTT - ( 2020 02:26 )  PTT:27.3 sec  ABG - ( 2020 16:41 )  pH, Arterial: 7.28  pH, Blood: x     /  pCO2: 63    /  pO2: 67    / HCO3: 29    / Base Excess: 1.8   /  SaO2: 94                Urinalysis Basic - ( 2020 05:57 )    Color: Yellow / Appearance: Clear / S.024 / pH: x  Gluc: x / Ketone: Negative  / Bili: Negative / Urobili: Negative   Blood: x / Protein: Trace / Nitrite: Negative   Leuk Esterase: Negative / RBC: 1 /hpf / WBC 1 /HPF   Sq Epi: x / Non Sq Epi: 0 /hpf / Bacteria: Negative      ======================Micro/Rad/Cardio=================  Culture: Reviewed   CXR: Reviewed  ======================================================  PAST MEDICAL & SURGICAL HISTORY:  Prediabetes  Essential hypertension  H/O:     ====================ASSESSMENT ==============  COVID 19 Positive   Intubated 4/20  Hypoxic Respiratory failure     Do not escalate care       Plan:  ====================== NEUROLOGY=====================  Sedated with Ketamine, Propofol, Dilaudid; plan to optimize sedation for patient comfort   Rocuronium weaned off  Continue to wean sedation as tolerated    acetaminophen   Tablet .. 650 milliGRAM(s) Oral every 6 hours PRN Temp greater or equal to 38C (100.4F)  HYDROmorphone  Injectable 0.5 milliGRAM(s) IV Push every 4 hours PRN Moderate Pain (4 - 6)  ketamine Infusion. 0.1 mG/kG/Hr (0.68 mL/Hr) IV Continuous <Continuous>  propofol Infusion 50 MICROgram(s)/kG/Min (20.4 mL/Hr) IV Continuous <Continuous>  rocuronium Infusion 8 MICROgram(s)/kG/Min (6.53 mL/Hr) IV Continuous <Continuous>    ==================== RESPIRATORY======================  On full vent support, Pressure control 38 with tidal volume 370, 70%, pco2 62, po2 67  Continue to wean FiO2 (goal to 30%, then wean PEEP as tolerated)  O2 sat goal >90%  Right pigtail in place, pneumo resolved, no air leak    Mechanical Ventilation:  Mode: AC/ CMV (Assist Control/ Continuous Mandatory Ventilation)  RR (machine): 40  FiO2: 70  PEEP: 5  ITime: 0.45  MAP: 17  PC: 38  PIP: 45    ====================CARDIOVASCULAR==================  Vaso weaned off, titrating levo  Wean vasopressors as tolerated, MAP goal 65-75    norepinephrine Infusion 0.05 MICROgram(s)/kG/Min (6.38 mL/Hr) IV Continuous <Continuous>    ===================HEMATOLOGIC/ONC ===================  Continue Lovenox for VTE prophylaxis    enoxaparin Injectable 60 milliGRAM(s) SubCutaneous every 12 hours    ===================== RENAL =========================  Concern for GWENDOLYN, lasix gtt started and UOP now 100/h  Starting sevelemer 800 po tid for hyperphosphatemia  Continue monitoring urine output, BUN/SCr, and lytes    furosemide Infusion 10 mG/Hr (5 mL/Hr) IV Continuous <Continuous>    ==================== GASTROINTESTINAL===================  Ok to resume tube feeds now supine  Continue pepcid for GI prophylaxis    dextrose 5% + sodium chloride 0.45%. 250 milliLiter(s) (250 mL/Hr) IV Continuous <Continuous>  dextrose 5%. 1000 milliLiter(s) (100 mL/Hr) IV Continuous <Continuous>  famotidine Injectable 20 milliGRAM(s) IV Push once PRN Infusion reaction  sodium chloride 0.9% lock flush 10 milliLiter(s) IV Push every 1 hour PRN Pre/post blood products, medications, blood draw, and to maintain line patency    =======================    ENDOCRINE  =====================  Hyperglycemic 170-300, likely related to dexamethasone, continue to monitor    allopurinol 300 milliGRAM(s) Oral daily  dexAMETHasone  IVPB 20 milliGRAM(s) IV Intermittent daily  insulin lispro (HumaLOG) corrective regimen sliding scale   SubCutaneous every 6 hours    ========================INFECTIOUS DISEASE================  WBC 23->17, BCx  with CoNS, ?possible contaminant  Received Vanco 1000 x1 this AM, will check level am  Completed COVID regimen and steroids  Convalescent Plasma on    Decadron started for decompensation last night       9pm: Spoke with daughter and updated her on condition. ABG improved slightly, UOP picking up with lasix gtt. Daughter reiterated again that she does not want CPR or dialysis. She is ok with titration of levophed drip, would like to be called if additional pressors are considered.    Patient requires continuous monitoring with bedside rhythm monitoring, arterial line, pulse oximetry, ventilator monitoring and intermittent blood gas analysis.  Care plan discussed with ICU care team.  Patient remained critical; required more than usual ICU care team; I have spent 35 minutes providing non-routine ICU care, revaluated multiple times during the day.    By signing my name below, I, Rebecca Charles, attest that this documentation has been prepared under the direction and in the presence of Dorothy Melgar PA.  Electronically signed: Xin Montano, 20 @ 20:16    I, Dorothy Melgar, personally performed the services described in this documentation. all medical record entries made by the alejandroibapolonia were at my direction and in my presence. I have reviewed the chart and agree that the record reflects my personal performance and is accurate and complete  Electronically signed: Dorothy Melgar PA. 20 @ 20:16

## 2020-04-22 NOTE — PROGRESS NOTE ADULT - PROBLEM SELECTOR PLAN 2
vent setting as per ICU   Patient tolerating Fio2 of 60 and 5 of peep  vent settings as per ICU  Prognosis poor  Daughter aware of prognosis

## 2020-04-22 NOTE — CHART NOTE - NSCHARTNOTEFT_GEN_A_CORE
Spoke with patients daughter Joanie Amaya, who states that she wants her mother to be comfortable and understands that there is no further treatment available to her and that her mothers condition continues to worsen. She asks that her mother be comfortable and that she does not want ECMO or ECOR even if it was available, her mother would not wish to live like this. Mobility team contacted to unprone patient and then will evaluate her status and what intervention is required to keep her comfortable.

## 2020-04-22 NOTE — CHART NOTE - NSCHARTNOTEFT_GEN_A_CORE
Nutrition Follow Up Note   Patient seen for: nutrition follow up on COVID ICU     Source: medical record, communication with team. Unable to speak to pt due to current airborne isolation contact precautions related to COVID-19. Pt remains intubated.     Chart reviewed, events noted. Pt is a 70 yo female with PMH of HTN and prediabetes, who presented with fever, chills, dry cough and worsening SOB, admitted 3/31 for acute respiratory failure with hypoxia from pneumonia, COVID-19+. Chart reviewed, events noted. 4/19, received convalescent plasma.  S/p Stroke Code this morning (4/20). DNR/DNI rescinded by family. Pt required intubation on 4/20 for hypoxemia. Course b/c pneumothorax.   Pt remains intubated, sedated, paralyzed, on pressors. Per [facility] discussion with family, no further escalation of care and ? palliative extubation.     Diet Order: Diet, NPO with Tube Feed:   Tube Feeding Modality: Orogastric  Nepro with Carb Steady (NEPRORTH)  Total Volume for 24 Hours (mL): 1000  Bolus  Total Volume of Bolus (mL):  250  Total # of Feeds: 4  Tube Feed Frequency: Every 6 hours   Tube Feed Start Time: 12:00  Bolus Feed Rate (mL per Hour): 250   Bolus Feed Duration (in Hours): 1 (04-21-20 @ 09:14)    CURRENT EN ORDER PROVIDES: 1800kcals and 81gm protein   Nutrition Events: Pt initiated on EN on 4/21. Bolus feeds now held.   Pt with severe hypernatremia. K+ and phos noted as elevated as well. free water started; 250ml q6hrs.     GI: Last BM 4/6, ? accuracy     Anthropometric Measurements:   Height (cm): 160.02 (03-31-20 @ 20:52)  Weight (kg): 68 (03-31-20 @ 20:52)  BMI (kg/m2): 26.6 (03-31-20 @ 20:52)      Medications: MEDICATIONS  (STANDING):  allopurinol 300 milliGRAM(s) Oral daily  chlorhexidine 0.12% Liquid 15 milliLiter(s) Oral Mucosa every 12 hours  chlorhexidine 2% Cloths 1 Application(s) Topical <User Schedule>  dexAMETHasone  IVPB 20 milliGRAM(s) IV Intermittent daily  dextrose 5%. 1000 milliLiter(s) (50 mL/Hr) IV Continuous <Continuous>  dorzolamide 2%/timolol 0.5% Ophthalmic Solution 1 Drop(s) Both EYES two times a day  enoxaparin Injectable 60 milliGRAM(s) SubCutaneous every 12 hours  insulin lispro (HumaLOG) corrective regimen sliding scale   SubCutaneous every 6 hours  ketamine Infusion. 0.1 mG/kG/Hr (0.68 mL/Hr) IV Continuous <Continuous>  latanoprost 0.005% Ophthalmic Solution 1 Drop(s) Both EYES at bedtime  norepinephrine Infusion 0.05 MICROgram(s)/kG/Min (6.38 mL/Hr) IV Continuous <Continuous>  propofol Infusion 50 MICROgram(s)/kG/Min (20.4 mL/Hr) IV Continuous <Continuous>  rocuronium Infusion 8 MICROgram(s)/kG/Min (6.53 mL/Hr) IV Continuous <Continuous>  vasopressin Infusion 0.1 Unit(s)/Min (6 mL/Hr) IV Continuous <Continuous>    MEDICATIONS  (PRN):  acetaminophen   Tablet .. 650 milliGRAM(s) Oral every 6 hours PRN Temp greater or equal to 38C (100.4F)  famotidine Injectable 20 milliGRAM(s) IV Push once PRN Infusion reaction  HYDROmorphone  Injectable 0.5 milliGRAM(s) IV Push every 4 hours PRN Moderate Pain (4 - 6)  sodium chloride 0.9% lock flush 10 milliLiter(s) IV Push every 1 hour PRN Pre/post blood products, medications, blood draw, and to maintain line patency    Labs: 04-22 @ 01:32: Sodium 161<HH>, Potassium 5.9<H>, Calcium 9.5, Magnesium 3.2<H>, Phosphorus 7.0<H>, BUN 82<H>, Creatinine 1.81<H>, Glucose 139<H>, Alk Phos 90, ALT/SGPT 175<H>, AST/SGOT 76<H>, Albumin 3.2<L>, Prealbumin --, Total Bilirubin 0.4, Hemoglobin 8.9<L>, Hematocrit 32.1<L>, Ferritin --, C-Reactive Protein --, Creatine Kinase <<27>  04-21 @ 15:16: Sodium 159<H>, Potassium 5.7<H>, Calcium 9.6, Magnesium --, Phosphorus --, BUN 83<H>, Creatinine 1.37<H>, Glucose 220<H>, Alk Phos 89, ALT/SGPT 196<H>, AST/SGOT 67<H>, Albumin 3.8, Prealbumin --, Total Bilirubin 0.5, Hemoglobin --, Hematocrit --, Ferritin --, C-Reactive Protein --, Creatine Kinase <<27>  04-21 @ 09:04: Sodium --, Potassium --, Calcium --, Magnesium --, Phosphorus --, BUN --, Creatinine --, Glucose --, Alk Phos --, ALT/SGPT --, AST/SGOT --, Albumin --, Prealbumin --, Total Bilirubin --, Hemoglobin --, Hematocrit --, Ferritin 2463<H>, C-Reactive Protein 5.48<H>, Creatine Kinase <<27>      Triglycerides, Serum: 173 mg/dL (04-22-20 @ 01:32)    POCT Blood Glucose.: 264 mg/dL (04-21-20 @ 18:20)  POCT Blood Glucose.: 173 mg/dL (04-21-20 @ 17:43)    Skin: intact  Edema:  +2 right arm, +3 left hand     Estimated Needs: re-estimated given intubation; based on weight; 68kg  Estimated Energy Needs (20-25calories/kg):  · From  20cal/kg)	1360  · To (25cal/kg)	1700     Estimated Protein Needs (1.2-1.4 gm/kg):  · From (1.2 g/kg)	81  · To (1.4 g/kg)	95      Previous Nutrition Diagnosis: inadequate oral intake   Nutrition Diagnosis is: deferred at this time.     New Nutrition Diagnosis:  None     Recommended Interventions:  Current medical condition precludes nutritional intervention at this time. RD remains available for nutrition recommendations should GOC change.   1. Defer nutrition support to team at this time.   2. Defer free water to team at this time.     Monitoring and Evaluation:   Continue to monitor nutrition provision and tolerance, weights, labs, skin integrity.   RD remains available upon request and will follow up per protocol.  Sarah Siegler RD, RDN, [facility] Pager #[phone]

## 2020-04-22 NOTE — PROGRESS NOTE ADULT - PROBLEM SELECTOR PLAN 5
Called patient and left him a vm to confirm with proceeding with the IVC filter removal and if so what location ([facility] or [facility]) so I can start with prior auth.    Secondary to Lasix   -Continue to monitor K+

## 2020-04-22 NOTE — CHART NOTE - NSCHARTNOTEFT_GEN_A_CORE
Spoke with daughter Joanie Amaya via phone at 540am and updated on patient status and poor prognosis.  She is making a few more phone calls and will likely decide to palliative extubate later this morning however she did state that if the patient were to code prior she would "not like any extreme measures including medications or CPR" and would like to not escalate care. Video chat with daughter at bedside for prayers completed, states patient already had last rights but she would still like pastoral care to give another blessing, pastoral care contacted and will provide. Per daughter, no other family members need to be contacted nor would anyone in family like to be present at bedside. All questions and concerns addressed, emotional support provided.   Discussed with MD Ledesma and he also agrees with plan.

## 2020-04-22 NOTE — CHART NOTE - NSCHARTNOTEFT_GEN_A_CORE
ABG still remains severely acidotic, RR with MD Ledesma and MD Odell at bedside, trial of BiVent support/APRV without improvement so patient placed back on pressure control.

## 2020-04-22 NOTE — PROGRESS NOTE ADULT - ASSESSMENT
70 yo F with PMhx of HTN, HFpEF prediabetes presents to the ED with complaints of fever, chills, dry cough and worsening SOB admit to medicine for acute hypoxic respiratory failure 2/2 COVID-19 pneumonia. Patient intubated overnight due to worsening hypoxia. Moved to the ICU for further care

## 2020-04-22 NOTE — PROGRESS NOTE ADULT - SUBJECTIVE AND OBJECTIVE BOX
68 yo F with PMhx of HTN and prediabetes presents to the ED with complaints of fever, chills, dry cough and worsening SOB. No acute events overnight. Patient has been remaining prone as much as possible.  Symptomatically patient feels a little better but O2 requirements are not lessened. Patient still unable to wean from 02. Patient with significant desaturation last night requiring intubation. Seen today In the ICU. Patient sedated and paralysed. OG tube placed for medication and nutrition Patine with severe  acidosis and elevated CO2 retention  Patientis intubated and sedate.  CO2 has gone down c/w yesterday.    MEDICATIONS  (STANDING):  allopurinol 300 milliGRAM(s) Oral daily  chlorhexidine 0.12% Liquid 15 milliLiter(s) Oral Mucosa every 12 hours  chlorhexidine 2% Cloths 1 Application(s) Topical <User Schedule>  dexAMETHasone  IVPB 20 milliGRAM(s) IV Intermittent daily  dorzolamide 2%/timolol 0.5% Ophthalmic Solution 1 Drop(s) Both EYES two times a day  enoxaparin Injectable 60 milliGRAM(s) SubCutaneous every 12 hours  insulin lispro (HumaLOG) corrective regimen sliding scale   SubCutaneous every 6 hours  ketamine Infusion. 0.1 mG/kG/Hr (0.68 mL/Hr) IV Continuous <Continuous>  latanoprost 0.005% Ophthalmic Solution 1 Drop(s) Both EYES at bedtime  norepinephrine Infusion 0.05 MICROgram(s)/kG/Min (6.38 mL/Hr) IV Continuous <Continuous>  propofol Infusion 50 MICROgram(s)/kG/Min (20.4 mL/Hr) IV Continuous <Continuous>  rocuronium Infusion 8 MICROgram(s)/kG/Min (6.53 mL/Hr) IV Continuous <Continuous>  sodium chloride 0.9%. 1000 milliLiter(s) (10 mL/Hr) IV Continuous <Continuous>    MEDICATIONS  (PRN):  acetaminophen   Tablet .. 650 milliGRAM(s) Oral every 6 hours PRN Temp greater or equal to 38C (100.4F)  famotidine Injectable 20 milliGRAM(s) IV Push once PRN Infusion reaction  HYDROmorphone  Injectable 0.25 milliGRAM(s) IV Push every 8 hours PRN Moderate pain, anxiety, dyspnea  sodium chloride 0.9% lock flush 10 milliLiter(s) IV Push every 1 hour PRN Pre/post blood products, medications, blood draw, and to maintain line patency        Vital Signs Last 24 Hrs  T(C): 36.9 (2020 16:00), Max: 37.5 (2020 04:00)  T(F): 98.4 (2020 16:00), Max: 99.5 (2020 04:00)  HR: 101 (2020 16:50) (84 - 125)  BP: 89/50 (2020 23:00) (89/50 - 89/50)  BP(mean): 67 (2020 23:00) (67 - 67)  RR: 9 (2020 16:00) (2 - 40)  SpO2: 96% (2020 16:50) (92% - 98%)    I    GENERAL: lethargic   HEAD:  Atraumatic  EYES: EOM, PERRLA, conjunctiva pink and sclera white  ENT: + epistaxis   NECK: Supple, No JVD, normal thyroid, carotids with normal upstrokes and no bruits  CHEST/LUNG: Clear to auscultation bilaterally, No rales, rhonchi, wheezing, or rubs  HEART: Regular rate and rhythm, No murmurs, rubs, or gallops  ABDOMEN: Patient currently prone  EXTREMITIES:  2+ Peripheral Pulses, No clubbing, cyanosis, or edema. No arthritis of shoulders, elbows, hands, hips, knees, ankles, or feet. No DJD C spine, T spine, or L/S spine  LYMPH: No lymphadenopathy noted  SKIN: No rashes or lesions  NERVOUS SYSTEM:  Alert but lethargic unable to speak    LABS:  ABG - ( 2020 21:40 )  pH, Arterial: 7.11  pH, Blood: x     /  pCO2: >104  /  pO2: 98    / HCO3: 38    / Base Excess: 5.7   /  SaO2: 96        ABG - ( 2020 16:41 )  pH, Arterial: 7.28  pH, Blood: x     /  pCO2: 63    /  pO2: 67    / HCO3: 29    / Base Excess: 1.8   /  SaO2: 94                CARDIAC MARKERS ( 2020 02:26 )  x     / x     / 124 U/L / x     / x          CBC Full  -  ( 2020 01:32 )  WBC Count : 17.20 K/uL  RBC Count : 2.89 M/uL  Hemoglobin : 8.9 g/dL  Hematocrit : 32.1 %  Platelet Count - Automated : 162 K/uL  Mean Cell Volume : 111.1 fl  Mean Cell Hemoglobin : 30.8 pg  Mean Cell Hemoglobin Concentration : 27.7 gm/dL  Auto Neutrophil # : x  Auto Lymphocyte # : x  Auto Monocyte # : x  Auto Eosinophil # : x  Auto Basophil # : x  Auto Neutrophil % : x  Auto Lymphocyte % : x  Auto Monocyte % : x  Auto Eosinophil % : x  Auto Basophil % : x        151<H>  |  111<H>  |  83<H>  ----------------------------<  348<H>  4.7   |  26  |  2.10<H>    Ca    9.1      2020 16:47  Phos  7.0       Mg     3.2         TPro  5.6<L>  /  Alb  3.1<L>  /  TBili  0.6  /  DBili  x   /  AST  53<H>  /  ALT  149<H>  /  AlkPhos  93      LIVER FUNCTIONS - ( 2020 16:47 )  Alb: 3.1 g/dL / Pro: 5.6 g/dL / ALK PHOS: 93 U/L / ALT: 149 U/L / AST: 53 U/L / GGT: x           PT/INR - ( 2020 02:26 )   PT: 12.8 sec;   INR: 1.11 ratio         PTT - ( 2020 02:26 )  PTT:27.3 sec  Urinalysis Basic - ( 2020 05:57 )    Color: Yellow / Appearance: Clear / S.024 / pH: x  Gluc: x / Ketone: Negative  / Bili: Negative / Urobili: Negative   Blood: x / Protein: Trace / Nitrite: Negative   Leuk Esterase: Negative / RBC: 1 /hpf / WBC 1 /HPF   Sq Epi: x / Non Sq Epi: 0 /hpf / Bacteria: Negative            CARDIAC MARKERS ( 2020 02:26 )  x     / x     / 124 U/L / x     / x          CBC Full  -  ( 2020 02:26 )  WBC Count : 23.48 K/uL  RBC Count : 4.22 M/uL  Hemoglobin : 12.7 g/dL  Hematocrit : 42.0 %  Platelet Count - Automated : 229 K/uL  Mean Cell Volume : 99.5 fl  Mean Cell Hemoglobin : 30.1 pg  Mean Cell Hemoglobin Concentration : 30.2 gm/dL  Auto Neutrophil # : x  Auto Lymphocyte # : x  Auto Monocyte # : x  Auto Eosinophil # : x  Auto Basophil # : x  Auto Neutrophil % : x  Auto Lymphocyte % : x  Auto Monocyte % : x  Auto Eosinophil % : x  Auto Basophil % : x        159<H>  |  116<H>  |  83<H>  ----------------------------<  220<H>  5.7<H>   |  34<H>  |  1.37<H>    Ca    9.6      2020 15:16  Phos  2.5       Mg     2.8         TPro  6.1  /  Alb  3.8  /  TBili  0.5  /  DBili  x   /  AST  67<H>  /  ALT  196<H>  /  AlkPhos  89      LIVER FUNCTIONS - ( 2020 15:16 )  Alb: 3.8 g/dL / Pro: 6.1 g/dL / ALK PHOS: 89 U/L / ALT: 196 U/L / AST: 67 U/L / GGT: x           PT/INR - ( 2020 02:26 )   PT: 12.8 sec;   INR: 1.11 ratio         PTT - ( 2020 02:26 )  PTT:27.3 sec  Urinalysis Basic - ( 2020 05:57 )    Color: Yellow / Appearance: Clear / S.024 / pH: x  Gluc: x / Ketone: Negative  / Bili: Negative / Urobili: Negative   Blood: x / Protein: Trace / Nitrite: Negative   Leuk Esterase: Negative / RBC: 1 /hpf / WBC 1 /HPF   Sq Epi: x / Non Sq Epi: 0 /hpf / Bacteria: Negative      CAPILLARY BLOOD GLUCOSE      POCT Blood Glucose.: 264 mg/dL (2020 18:20)  POCT Blood Glucose.: 173 mg/dL (2020 17:43)      RADIOLOGY & ADDITIONAL TESTS:

## 2020-04-22 NOTE — PROGRESS NOTE ADULT - SUBJECTIVE AND OBJECTIVE BOX
KIMBERLY KUMAR  MRN-[id number]  Patient is a 69y old  Female who presents with a chief complaint of 69F p/w sob and fever (2020 23:36)    HPI:  Portions of this History and Physical Exam are adopted from ER Provider Notation per [facility] policy to limit healthcare provider exposure during COVID19 Pandemic    Unable to reach patient in ED via phone. Called the patient's  Mr. Cooper Kumar to obtain additional history.  Adopted from ED notation:   HPI Objective Statement: 70 yo F with PMhx of HTN and prediabetes presents to the ED with complaints of fever, chills, dry cough and worsening SOB. Patient reports that she has been feeling febrile for the last 2 weeks. She has intermittent chills and dry cough. She feels chest pressure when she coughs. Her SOB has been worsening. She becomes short of breath when she walks to the bathroom from the bed. She also reports to have orthopnea and LE edema. She has been sleeping in the chair for the last couple of days. She denies any recent travel. She went to [place] before her symptoms and thinks that she was exposed there. She reports to have HTN and prediabetes. She had C-sectionsx2 in the past. No family hx of heart or lung disease. Lives with her . Able to take care of ADLs. No hx of smoking or chronic alcohol consumptions. Allergic to neuomycin-develops rash and swelling.  PCP: Dr. Hugh Pinedo    In the ED, .2, 158/89, 86->111, 18 100% NRB  s/p [id number] IVx1, potassium 40 POx1 (2020 03:20)      Surgery/Hospital course:  COVID 19 Positive   Tx CTU after being intubated for hypoxic respiratory failure    R pig tail for PTX     REVIEW OF SYSTEMS:  Unable to obtain, vented     Vital Signs Last 24 Hrs  T(C): 37.5 (2020 04:00), Max: 37.5 (2020 04:00)  T(F): 99.5 (2020 04:00), Max: 99.5 (2020 04:00)  HR: 110 (2020 05:00) (55 - 125)  BP: 89/50 (2020 23:00) (89/50 - 89/50)  BP(mean): 67 (2020 23:00) (67 - 67)  RR: 36 (2020 05:00) (0 - 36)  SpO2: 94% (2020 05:00) (87% - 99%)    ============================I/O===========================   I&O's Detail    2020 07:01  -  2020 07:00  --------------------------------------------------------  IN:    Albumin 5%  - 250 mL: 500 mL    dextrose 5%.: 100 mL    Enteral Tube Flush: 100 mL    Free Water: 250 mL    ketamine Infusion.: 234.6 mL    Nepro with Carb Steady: 500 mL    norepinephrine Infusion: 229.3 mL    propofol Infusion: 318.4 mL    rocuronium Infusion: 149.5 mL    sodium chloride 0.9%: 70 mL    Sodium Chloride 0.9% IV Bolus: 1500 mL    vasopressin Infusion: 48 mL  Total IN: 3999.8 mL    OUT:    Chest Tube: 30 mL    Indwelling Catheter - Urethral: 725 mL  Total OUT: 755 mL    Total NET: 3244.8 mL        ============================ LABS =========================                        8.9    17.20 )-----------( 162      ( 2020 01:32 )             32.1     04-22    161<HH>  |  119<H>  |  82<H>  ----------------------------<  139<H>  5.9<H>   |  36<H>  |  1.81<H>    Ca    9.5      2020 01:32  Phos  7.0     04-22  Mg     3.2     04-22    TPro  5.9<L>  /  Alb  3.2<L>  /  TBili  0.4  /  DBili  x   /  AST  76<H>  /  ALT  175<H>  /  AlkPhos  90      LIVER FUNCTIONS - ( 2020 01:32 )  Alb: 3.2 g/dL / Pro: 5.9 g/dL / ALK PHOS: 90 U/L / ALT: 175 U/L / AST: 76 U/L / GGT: x           PT/INR - ( 2020 02:26 )   PT: 12.8 sec;   INR: 1.11 ratio         PTT - ( 2020 02:26 )  PTT:27.3 sec  ABG - ( 2020 04:23 )  pH, Arterial: 7.16  pH, Blood: x     /  pCO2: 97    /  pO2: 112   / HCO3: 33    / Base Excess: 2.6   /  SaO2: 98                Urinalysis Basic - ( 2020 05:57 )    Color: Yellow / Appearance: Clear / S.024 / pH: x  Gluc: x / Ketone: Negative  / Bili: Negative / Urobili: Negative   Blood: x / Protein: Trace / Nitrite: Negative   Leuk Esterase: Negative / RBC: 1 /hpf / WBC 1 /HPF   Sq Epi: x / Non Sq Epi: 0 /hpf / Bacteria: Negative      ======================Micro/Rad/Cardio=================  Culture: Reviewed   CXR: Reviewed  ======================================================  PAST MEDICAL & SURGICAL HISTORY:  Prediabetes  Essential hypertension  H/O:     ====================ASSESSMENT ==============  COVID 19 Positive   Intubated   Hypoxic Respiratory failure     Plan:  ====================== NEUROLOGY=====================  Sedated with Ketamine, Propofol  Paralyzed with Rocuronium   Dilaudid for analgesia     acetaminophen   Tablet .. 650 milliGRAM(s) Oral every 6 hours PRN Temp greater or equal to 38C (100.4F)  HYDROmorphone  Injectable 0.5 milliGRAM(s) IV Push every 4 hours PRN Moderate Pain (4 - 6)  ketamine Infusion. 0.1 mG/kG/Hr (0.68 mL/Hr) IV Continuous <Continuous>  propofol Infusion 50 MICROgram(s)/kG/Min (20.4 mL/Hr) IV Continuous <Continuous>  rocuronium Infusion 8 MICROgram(s)/kG/Min (6.53 mL/Hr) IV Continuous <Continuous>    ==================== RESPIRATORY======================  On full vent support     Mechanical Ventilation:  Mode: AC/ CMV (Assist Control/ Continuous Mandatory Ventilation)  RR (machine): 36  TV (machine): 320  FiO2: 60  PEEP: 5  ITime: 1  MAP: 17  PC: 38  PIP: 45      ====================CARDIOVASCULAR==================  On pressor support for hypotension     norepinephrine Infusion 0.05 MICROgram(s)/kG/Min (6.38 mL/Hr) IV Continuous <Continuous>  vasopressin Infusion 0.1 Unit(s)/Min (6 mL/Hr) IV Continuous <Continuous>    ===================HEMATOLOGIC/ONC ===================  VTE prophylaxis, enoxaparin Injectable 60 milliGRAM(s) SubCutaneous every 12 hours    ===================== RENAL =========================  Continue monitoring urine output    ==================== GASTROINTESTINAL===================  Tolerating tube feeds, Nepro carb steady 250 [id number]     dextrose 5%. 1000 milliLiter(s) (50 mL/Hr) IV Continuous <Continuous>  GI prophylaxis, famotidine Injectable 20 milliGRAM(s) IV Push once PRN Infusion reaction  sodium chloride 0.9% lock flush 10 milliLiter(s) IV Push every 1 hour PRN Pre/post blood products, medications, blood draw, and to maintain line patency    =======================    ENDOCRINE  =====================  Glucose control,   allopurinol 300 milliGRAM(s) Oral daily  dexAMETHasone  IVPB 20 milliGRAM(s) IV Intermittent daily  insulin lispro (HumaLOG) corrective regimen sliding scale   SubCutaneous every 6 hours    ========================INFECTIOUS DISEASE================  Completed COVID regimen and steroids  Convalescent Plasma on      Patient requires continuous monitoring with bedside rhythm monitoring, pulse ox monitoring, and intermittent blood gas analysis. Care plan discussed with ICU care team. Patient remained critical and at risk for life threatening decompensation.    By signing my name below, I, Jo Ann Lombardo, attest that this documentation has been prepared under the direction and in the presence of MYLENE Urias.  Electronically signed: Xin Wetzel, 20 @ 07:27    I, Bradley Dick, personally performed the services described in this documentation. all medical record entries made by the alejandroibapolonia were at my direction and in my presence. I have reviewed the chart and agree that the record reflects my personal performance and is accurate and complete  Electronically signed: MYLENE Urias KIMBERLY KUMAR  MRN-[id number]  Patient is a 69y old  Female who presents with a chief complaint of 69F p/w sob and fever (2020 23:36)    HPI:  Portions of this History and Physical Exam are adopted from ER Provider Notation per [facility] policy to limit healthcare provider exposure during COVID19 Pandemic    Unable to reach patient in ED via phone. Called the patient's  Mr. Cooper Kumar to obtain additional history.  Adopted from ED notation:   HPI Objective Statement: 70 yo F with PMhx of HTN and prediabetes presents to the ED with complaints of fever, chills, dry cough and worsening SOB. Patient reports that she has been feeling febrile for the last 2 weeks. She has intermittent chills and dry cough. She feels chest pressure when she coughs. Her SOB has been worsening. She becomes short of breath when she walks to the bathroom from the bed. She also reports to have orthopnea and LE edema. She has been sleeping in the chair for the last couple of days. She denies any recent travel. She went to [place] before her symptoms and thinks that she was exposed there. She reports to have HTN and prediabetes. She had C-sectionsx2 in the past. No family hx of heart or lung disease. Lives with her . Able to take care of ADLs. No hx of smoking or chronic alcohol consumptions. Allergic to neuomycin-develops rash and swelling.  PCP: Dr. Hugh Pinedo    In the ED, .2, 158/89, 86->111, 18 100% NRB  s/p [id number] IVx1, potassium 40 POx1 (2020 03:20)      Surgery/Hospital course:  COVID 19 Positive   Tx CTU after being intubated for hypoxic respiratory failure    R pig tail for PTX     Today/Overnight:   Overnight, acute worsening of hypercarbia 59 -> 197  Proned overnight, consulted for possible ECMO (turned down) / ECOR (none available)   Decadron started for decompensation     REVIEW OF SYSTEMS:  Unable to obtain, vented     Vital Signs Last 24 Hrs  T(C): 37.5 (2020 04:00), Max: 37.5 (2020 04:00)  T(F): 99.5 (2020 04:00), Max: 99.5 (2020 04:00)  HR: 110 (2020 05:00) (55 - 125)  BP: 89/50 (2020 23:00) (89/50 - 89/50)  BP(mean): 67 (2020 23:00) (67 - 67)  RR: 36 (2020 05:00) (0 - 36)  SpO2: 94% (2020 05:00) (87% - 99%)    ============================I/O===========================   I&O's Detail    2020 07:01  -  2020 07:00  --------------------------------------------------------  IN:    Albumin 5%  - 250 mL: 500 mL    dextrose 5%.: 100 mL    Enteral Tube Flush: 100 mL    Free Water: 250 mL    ketamine Infusion.: 234.6 mL    Nepro with Carb Steady: 500 mL    norepinephrine Infusion: 229.3 mL    propofol Infusion: 318.4 mL    rocuronium Infusion: 149.5 mL    sodium chloride 0.9%: 70 mL    Sodium Chloride 0.9% IV Bolus: 1500 mL    vasopressin Infusion: 48 mL  Total IN: 3999.8 mL    OUT:    Chest Tube: 30 mL    Indwelling Catheter - Urethral: 725 mL  Total OUT: 755 mL    Total NET: 3244.8 mL        ============================ LABS =========================                        8.9    17.20 )-----------( 162      ( 2020 01:32 )             32.1     04-22    161<HH>  |  119<H>  |  82<H>  ----------------------------<  139<H>  5.9<H>   |  36<H>  |  1.81<H>    Ca    9.5      2020 01:32  Phos  7.0     -  Mg     3.2     -    TPro  5.9<L>  /  Alb  3.2<L>  /  TBili  0.4  /  DBili  x   /  AST  76<H>  /  ALT  175<H>  /  AlkPhos  90  -22    LIVER FUNCTIONS - ( 2020 01:32 )  Alb: 3.2 g/dL / Pro: 5.9 g/dL / ALK PHOS: 90 U/L / ALT: 175 U/L / AST: 76 U/L / GGT: x           PT/INR - ( 2020 02:26 )   PT: 12.8 sec;   INR: 1.11 ratio         PTT - ( 2020 02:26 )  PTT:27.3 sec  ABG - ( 2020 04:23 )  pH, Arterial: 7.16  pH, Blood: x     /  pCO2: 97    /  pO2: 112   / HCO3: 33    / Base Excess: 2.6   /  SaO2: 98                Urinalysis Basic - ( 2020 05:57 )    Color: Yellow / Appearance: Clear / S.024 / pH: x  Gluc: x / Ketone: Negative  / Bili: Negative / Urobili: Negative   Blood: x / Protein: Trace / Nitrite: Negative   Leuk Esterase: Negative / RBC: 1 /hpf / WBC 1 /HPF   Sq Epi: x / Non Sq Epi: 0 /hpf / Bacteria: Negative      ======================Micro/Rad/Cardio=================  Culture: Reviewed   CXR: Reviewed  ======================================================  PAST MEDICAL & SURGICAL HISTORY:  Prediabetes  Essential hypertension  H/O:     ====================ASSESSMENT ==============  COVID 19 Positive   Intubated   Hypoxic Respiratory failure     Do not escalate care   Plan:  ====================== NEUROLOGY=====================  Sedated with Ketamine, Propofol, Dilaudid; plan to optimize sedation for patient comfort   Paralyzed with Rocuronium     acetaminophen   Tablet .. 650 milliGRAM(s) Oral every 6 hours PRN Temp greater or equal to 38C (100.4F)  HYDROmorphone  Injectable 0.5 milliGRAM(s) IV Push every 4 hours PRN Moderate Pain (4 - 6)  ketamine Infusion. 0.1 mG/kG/Hr (0.68 mL/Hr) IV Continuous <Continuous>  propofol Infusion 50 MICROgram(s)/kG/Min (20.4 mL/Hr) IV Continuous <Continuous>  rocuronium Infusion 8 MICROgram(s)/kG/Min (6.53 mL/Hr) IV Continuous <Continuous>    ==================== RESPIRATORY======================  On full vent support, wean as tolerated     Mode: AC/ CMV (Assist Control/ Continuous Mandatory Ventilation)  RR (machine): 36  TV (machine): 320  FiO2: 70  PEEP: 5  ITime: 1  MAP: 17  PC: 40  PIP: 38      ====================CARDIOVASCULAR==================  On pressor support for hypotension     norepinephrine Infusion 0.05 MICROgram(s)/kG/Min (6.38 mL/Hr) IV Continuous <Continuous>  vasopressin Infusion 0.1 Unit(s)/Min (6 mL/Hr) IV Continuous <Continuous>    ===================HEMATOLOGIC/ONC ===================  VTE prophylaxis, enoxaparin Injectable 60 milliGRAM(s) SubCutaneous every 12 hours    ===================== RENAL =========================  Continue monitoring urine output, 725cc overnight   Overall, +3.5L   Rising creatine, 1.8 from 1.4	  Monitor and replete electrolytes prn   D5W 50/hr for hypernatremia, 150     ==================== GASTROINTESTINAL===================  Tube held 2/2 proned     dextrose 5%. 1000 milliLiter(s) (50 mL/Hr) IV Continuous <Continuous>  GI prophylaxis, famotidine Injectable 20 milliGRAM(s) IV Push once PRN Infusion reaction  sodium chloride 0.9% lock flush 10 milliLiter(s) IV Push every 1 hour PRN Pre/post blood products, medications, blood draw, and to maintain line patency    =======================    ENDOCRINE  =====================  Glucose control,   allopurinol 300 milliGRAM(s) Oral daily  insulin lispro (HumaLOG) corrective regimen sliding scale   SubCutaneous every 6 hours    ========================INFECTIOUS DISEASE================  Completed COVID regimen and steroids  Convalescent Plasma on    Blood cultures with gram positive cocci, received Vanco x1 this AM  Will check trough this afternoon for dosing given rising creatine   Decadron started for decompensation last night     dexAMETHasone  IVPB 20 milliGRAM(s) IV Intermittent daily    Poor prognosis, do not escalate care as per family discussion last night.     Patient requires continuous monitoring with bedside rhythm monitoring, pulse ox monitoring, and intermittent blood gas analysis. Care plan discussed with ICU care team. Patient remained critical and at risk for life threatening decompensation.    By signing my name below, I, Jo Ann Lombardo, attest that this documentation has been prepared under the direction and in the presence of MYLENE Urias.  Electronically signed: Xin Wetzel, 20 @ 07:27    I, Bradley Dick, personally performed the services described in this documentation. all medical record entries made by the alejandroibe were at my direction and in my presence. I have reviewed the chart and agree that the record reflects my personal performance and is accurate and complete  Electronically signed: MYLENE Urias KIMBERLY KUMAR  MRN-[id number]  Patient is a 69y old  Female who presents with a chief complaint of 69F p/w sob and fever (2020 23:36)    HPI:  Portions of this History and Physical Exam are adopted from ER Provider Notation per [facility] policy to limit healthcare provider exposure during COVID19 Pandemic    Unable to reach patient in ED via phone. Called the patient's  Mr. Cooper Kumar to obtain additional history.  Adopted from ED notation:   HPI Objective Statement: 70 yo F with PMhx of HTN and prediabetes presents to the ED with complaints of fever, chills, dry cough and worsening SOB. Patient reports that she has been feeling febrile for the last 2 weeks. She has intermittent chills and dry cough. She feels chest pressure when she coughs. Her SOB has been worsening. She becomes short of breath when she walks to the bathroom from the bed. She also reports to have orthopnea and LE edema. She has been sleeping in the chair for the last couple of days. She denies any recent travel. She went to [place] before her symptoms and thinks that she was exposed there. She reports to have HTN and prediabetes. She had C-sectionsx2 in the past. No family hx of heart or lung disease. Lives with her . Able to take care of ADLs. No hx of smoking or chronic alcohol consumptions. Allergic to neuomycin-develops rash and swelling.  PCP: Dr. Hugh Pinedo    In the ED, .2, 158/89, 86->111, 18 100% NRB  s/p [id number] IVx1, potassium 40 POx1 (2020 03:20)      Surgery/Hospital course:  COVID 19 Positive   Tx CTU after being intubated for hypoxic respiratory failure    R pig tail for PTX     Today/Overnight:   Overnight, acute worsening of hypercarbia 59 -> 197  Proned overnight, consulted for possible ECMO (turned down) / ECOR (none available)   Decadron started for decompensation     REVIEW OF SYSTEMS:  Unable to obtain, vented     Vital Signs Last 24 Hrs  T(C): 37.5 (2020 04:00), Max: 37.5 (2020 04:00)  T(F): 99.5 (2020 04:00), Max: 99.5 (2020 04:00)  HR: 110 (2020 05:00) (55 - 125)  BP: 89/50 (2020 23:00) (89/50 - 89/50)  BP(mean): 67 (2020 23:00) (67 - 67)  RR: 36 (2020 05:00) (0 - 36)  SpO2: 94% (2020 05:00) (87% - 99%)    ============================I/O===========================   I&O's Detail    2020 07:01  -  2020 07:00  --------------------------------------------------------  IN:    Albumin 5%  - 250 mL: 500 mL    dextrose 5%.: 100 mL    Enteral Tube Flush: 100 mL    Free Water: 250 mL    ketamine Infusion.: 234.6 mL    Nepro with Carb Steady: 500 mL    norepinephrine Infusion: 229.3 mL    propofol Infusion: 318.4 mL    rocuronium Infusion: 149.5 mL    sodium chloride 0.9%: 70 mL    Sodium Chloride 0.9% IV Bolus: 1500 mL    vasopressin Infusion: 48 mL  Total IN: 3999.8 mL    OUT:    Chest Tube: 30 mL    Indwelling Catheter - Urethral: 725 mL  Total OUT: 755 mL    Total NET: 3244.8 mL        ============================ LABS =========================                        8.9    17.20 )-----------( 162      ( 2020 01:32 )             32.1     04-22    161<HH>  |  119<H>  |  82<H>  ----------------------------<  139<H>  5.9<H>   |  36<H>  |  1.81<H>    Ca    9.5      2020 01:32  Phos  7.0     -  Mg     3.2     -    TPro  5.9<L>  /  Alb  3.2<L>  /  TBili  0.4  /  DBili  x   /  AST  76<H>  /  ALT  175<H>  /  AlkPhos  90  -22    LIVER FUNCTIONS - ( 2020 01:32 )  Alb: 3.2 g/dL / Pro: 5.9 g/dL / ALK PHOS: 90 U/L / ALT: 175 U/L / AST: 76 U/L / GGT: x           PT/INR - ( 2020 02:26 )   PT: 12.8 sec;   INR: 1.11 ratio         PTT - ( 2020 02:26 )  PTT:27.3 sec  ABG - ( 2020 04:23 )  pH, Arterial: 7.16  pH, Blood: x     /  pCO2: 97    /  pO2: 112   / HCO3: 33    / Base Excess: 2.6   /  SaO2: 98                Urinalysis Basic - ( 2020 05:57 )    Color: Yellow / Appearance: Clear / S.024 / pH: x  Gluc: x / Ketone: Negative  / Bili: Negative / Urobili: Negative   Blood: x / Protein: Trace / Nitrite: Negative   Leuk Esterase: Negative / RBC: 1 /hpf / WBC 1 /HPF   Sq Epi: x / Non Sq Epi: 0 /hpf / Bacteria: Negative      ======================Micro/Rad/Cardio=================  Culture: Reviewed   CXR: Reviewed  ======================================================  PAST MEDICAL & SURGICAL HISTORY:  Prediabetes  Essential hypertension  H/O:     ====================ASSESSMENT ==============  COVID 19 Positive   Intubated   Hypoxic Respiratory failure     Do not escalate care   Plan:  ====================== NEUROLOGY=====================  Sedated with Ketamine, Propofol, Dilaudid; plan to optimize sedation for patient comfort   Paralyzed with Rocuronium     acetaminophen   Tablet .. 650 milliGRAM(s) Oral every 6 hours PRN Temp greater or equal to 38C (100.4F)  HYDROmorphone  Injectable 0.5 milliGRAM(s) IV Push every 4 hours PRN Moderate Pain (4 - 6)  ketamine Infusion. 0.1 mG/kG/Hr (0.68 mL/Hr) IV Continuous <Continuous>  propofol Infusion 50 MICROgram(s)/kG/Min (20.4 mL/Hr) IV Continuous <Continuous>  rocuronium Infusion 8 MICROgram(s)/kG/Min (6.53 mL/Hr) IV Continuous <Continuous>    ==================== RESPIRATORY======================  On full vent support, wean as tolerated     Mode: AC/ CMV (Assist Control/ Continuous Mandatory Ventilation)  RR (machine): 36  TV (machine): 320  FiO2: 70  PEEP: 5  ITime: 1  MAP: 17  PC: 40  PIP: 38      ====================CARDIOVASCULAR==================  On pressor support for hypotension     norepinephrine Infusion 0.05 MICROgram(s)/kG/Min (6.38 mL/Hr) IV Continuous <Continuous>  vasopressin Infusion 0.1 Unit(s)/Min (6 mL/Hr) IV Continuous <Continuous>    ===================HEMATOLOGIC/ONC ===================  VTE prophylaxis, enoxaparin Injectable 60 milliGRAM(s) SubCutaneous every 12 hours    ===================== RENAL =========================  Continue monitoring urine output, 725cc overnight   Overall, +3.5L   Rising creatine, 1.8 from 1.4	  Monitor and replete electrolytes prn   D5W 50/hr for hypernatremia, 150     ==================== GASTROINTESTINAL===================  Tube held 2/2 proned FW held    dextrose 5%. 1000 milliLiter(s) (50 mL/Hr) IV Continuous <Continuous>  GI prophylaxis, famotidine Injectable 20 milliGRAM(s) IV Push once PRN Infusion reaction  sodium chloride 0.9% lock flush 10 milliLiter(s) IV Push every 1 hour PRN Pre/post blood products, medications, blood draw, and to maintain line patency    =======================    ENDOCRINE  =====================  Glucose control,   allopurinol 300 milliGRAM(s) Oral daily  insulin lispro (HumaLOG) corrective regimen sliding scale   SubCutaneous every 6 hours    ========================INFECTIOUS DISEASE================  Completed COVID regimen and steroids  Convalescent Plasma on    Blood cultures with gram positive cocci, received Vanco x1 this AM  Will check trough this afternoon for dosing given rising creatine   Decadron started for decompensation last night     dexAMETHasone  IVPB 20 milliGRAM(s) IV Intermittent daily    Poor prognosis, do not escalate care as per family discussion last night.     Patient requires continuous monitoring with bedside rhythm monitoring, pulse ox monitoring, and intermittent blood gas analysis. Care plan discussed with ICU care team. Patient remained critical and at risk for life threatening decompensation.    By signing my name below, I, Jo Ann Lombardo, attest that this documentation has been prepared under the direction and in the presence of MYLENE Urias.  Electronically signed: Xin Wetzel, 20 @ 07:27    I, Bradley Dick, personally performed the services described in this documentation. all medical record entries made by the alejandroibe were at my direction and in my presence. I have reviewed the chart and agree that the record reflects my personal performance and is accurate and complete  Electronically signed: MYLENE Urias

## 2020-04-23 LAB
ALBUMIN SERPL ELPH-MCNC: 2.7 G/DL — LOW (ref 3.3–5)
ALP SERPL-CCNC: 91 U/L — SIGNIFICANT CHANGE UP (ref 40–120)
ALT FLD-CCNC: 121 U/L — HIGH (ref 10–45)
ANION GAP SERPL CALC-SCNC: 15 MMOL/L — SIGNIFICANT CHANGE UP (ref 5–17)
APTT BLD: 39.8 SEC — HIGH (ref 27.5–36.3)
AST SERPL-CCNC: 42 U/L — HIGH (ref 10–40)
BILIRUB SERPL-MCNC: 0.4 MG/DL — SIGNIFICANT CHANGE UP (ref 0.2–1.2)
BLD GP AB SCN SERPL QL: NEGATIVE — SIGNIFICANT CHANGE UP
BUN SERPL-MCNC: 82 MG/DL — HIGH (ref 7–23)
CALCIUM SERPL-MCNC: 8.7 MG/DL — SIGNIFICANT CHANGE UP (ref 8.4–10.5)
CHLORIDE SERPL-SCNC: 105 MMOL/L — SIGNIFICANT CHANGE UP (ref 96–108)
CO2 SERPL-SCNC: 25 MMOL/L — SIGNIFICANT CHANGE UP (ref 22–31)
CREAT SERPL-MCNC: 2.19 MG/DL — HIGH (ref 0.5–1.3)
CULTURE RESULTS: SIGNIFICANT CHANGE UP
GAS PNL BLDA: SIGNIFICANT CHANGE UP
GLUCOSE SERPL-MCNC: 323 MG/DL — HIGH (ref 70–99)
HCT VFR BLD CALC: 26.5 % — LOW (ref 34.5–45)
HEPARIN-PF4 AB RESULT: 0.6 U/ML — SIGNIFICANT CHANGE UP (ref 0–0.9)
HGB BLD-MCNC: 7.9 G/DL — LOW (ref 11.5–15.5)
INR BLD: 1.21 RATIO — HIGH (ref 0.88–1.16)
MAGNESIUM SERPL-MCNC: 2.4 MG/DL — SIGNIFICANT CHANGE UP (ref 1.6–2.6)
MCHC RBC-ENTMCNC: 29.8 GM/DL — LOW (ref 32–36)
MCHC RBC-ENTMCNC: 31.1 PG — SIGNIFICANT CHANGE UP (ref 27–34)
MCV RBC AUTO: 104.3 FL — HIGH (ref 80–100)
NRBC # BLD: 0 /100 WBCS — SIGNIFICANT CHANGE UP (ref 0–0)
ORGANISM # SPEC MICROSCOPIC CNT: SIGNIFICANT CHANGE UP
ORGANISM # SPEC MICROSCOPIC CNT: SIGNIFICANT CHANGE UP
PF4 HEPARIN CMPLX AB SER-ACNC: NEGATIVE — SIGNIFICANT CHANGE UP
PHOSPHATE SERPL-MCNC: 3.5 MG/DL — SIGNIFICANT CHANGE UP (ref 2.5–4.5)
PLATELET # BLD AUTO: 112 K/UL — LOW (ref 150–400)
POTASSIUM SERPL-MCNC: 4.2 MMOL/L — SIGNIFICANT CHANGE UP (ref 3.5–5.3)
POTASSIUM SERPL-SCNC: 4.2 MMOL/L — SIGNIFICANT CHANGE UP (ref 3.5–5.3)
PROT SERPL-MCNC: 5.3 G/DL — LOW (ref 6–8.3)
PROTHROM AB SERPL-ACNC: 14 SEC — HIGH (ref 10–12.9)
RBC # BLD: 2.54 M/UL — LOW (ref 3.8–5.2)
RBC # FLD: 14.7 % — HIGH (ref 10.3–14.5)
RH IG SCN BLD-IMP: POSITIVE — SIGNIFICANT CHANGE UP
SODIUM SERPL-SCNC: 145 MMOL/L — SIGNIFICANT CHANGE UP (ref 135–145)
SPECIMEN SOURCE: SIGNIFICANT CHANGE UP
VANCOMYCIN FLD-MCNC: 13.1 UG/ML — SIGNIFICANT CHANGE UP
WBC # BLD: 15.86 K/UL — HIGH (ref 3.8–10.5)
WBC # FLD AUTO: 15.86 K/UL — HIGH (ref 3.8–10.5)

## 2020-04-23 RX ORDER — ENOXAPARIN SODIUM 100 MG/ML
60 INJECTION SUBCUTANEOUS DAILY
Refills: 0 | Status: DISCONTINUED | OUTPATIENT
Start: 2020-04-23 | End: 2020-04-23

## 2020-04-23 RX ORDER — SODIUM CHLORIDE 9 MG/ML
1000 INJECTION, SOLUTION INTRAVENOUS
Refills: 0 | Status: DISCONTINUED | OUTPATIENT
Start: 2020-04-23 | End: 2020-04-23

## 2020-04-23 RX ORDER — HYDROMORPHONE HYDROCHLORIDE 2 MG/ML
1 INJECTION INTRAMUSCULAR; INTRAVENOUS; SUBCUTANEOUS
Qty: 100 | Refills: 0 | Status: DISCONTINUED | OUTPATIENT
Start: 2020-04-23 | End: 2020-04-23

## 2020-04-23 RX ORDER — POTASSIUM CHLORIDE 20 MEQ
40 PACKET (EA) ORAL ONCE
Refills: 0 | Status: COMPLETED | OUTPATIENT
Start: 2020-04-23 | End: 2020-04-23

## 2020-04-23 RX ORDER — ENOXAPARIN SODIUM 100 MG/ML
60 INJECTION SUBCUTANEOUS
Refills: 0 | Status: DISCONTINUED | OUTPATIENT
Start: 2020-04-23 | End: 2020-04-23

## 2020-04-23 RX ADMIN — Medication 110 MILLIGRAM(S): at 04:57

## 2020-04-23 RX ADMIN — DORZOLAMIDE HYDROCHLORIDE TIMOLOL MALEATE 1 DROP(S): 20; 5 SOLUTION/ DROPS OPHTHALMIC at 04:57

## 2020-04-23 RX ADMIN — CHLORHEXIDINE GLUCONATE 15 MILLILITER(S): 213 SOLUTION TOPICAL at 06:14

## 2020-04-23 RX ADMIN — SEVELAMER CARBONATE 800 MILLIGRAM(S): 2400 POWDER, FOR SUSPENSION ORAL at 00:58

## 2020-04-23 RX ADMIN — Medication 40 MILLIEQUIVALENT(S): at 09:22

## 2020-04-23 RX ADMIN — ENOXAPARIN SODIUM 60 MILLIGRAM(S): 100 INJECTION SUBCUTANEOUS at 12:01

## 2020-04-23 RX ADMIN — CHLORHEXIDINE GLUCONATE 1 APPLICATION(S): 213 SOLUTION TOPICAL at 06:14

## 2020-04-23 RX ADMIN — Medication 6: at 00:57

## 2020-04-23 RX ADMIN — SEVELAMER CARBONATE 800 MILLIGRAM(S): 2400 POWDER, FOR SUSPENSION ORAL at 05:54

## 2020-04-23 RX ADMIN — Medication 300 MILLIGRAM(S): at 12:00

## 2020-04-23 RX ADMIN — Medication 6: at 05:53

## 2020-04-23 NOTE — PROGRESS NOTE ADULT - PROBLEM SELECTOR PLAN 1
"Subjective:       Patient ID: Shahram Hills is a 78 y.o. female.    Chief Complaint: Cancer    Patient here to discuss treatment of neuroendocrine tumors in her liver. She was diagnosed last year. She underwent a biopsy of her liver on 5/18/2018. Pathology report noted "metastatic well differentiated neuroendocrine tumor." Further work up shows she also has metastasis to bone and lymph nodes. She reports feeling well. She denies any abdominal pain or distention. She is accompanied by her sister. They also call her niece who is a nurse in [place] on speaker phone.       Review of Systems   Constitutional: Positive for fatigue (sometimes). Negative for activity change, appetite change, chills and fever.   HENT: Negative for congestion, drooling, ear discharge, postnasal drip, sneezing and trouble swallowing.    Eyes: Negative for pain, discharge, redness and itching.   Respiratory: Negative for cough, shortness of breath, wheezing and stridor.    Cardiovascular: Negative for chest pain, palpitations and leg swelling.   Gastrointestinal: Negative for abdominal distention, abdominal pain, constipation, diarrhea, nausea and vomiting.   Genitourinary: Negative for difficulty urinating, dysuria, frequency and urgency.   Musculoskeletal: Positive for arthralgias. Negative for back pain, gait problem, joint swelling, myalgias and neck pain.        Sometimes right leg pain   Skin: Negative for color change, pallor and rash.   Neurological: Positive for headaches (very seldom when hungry). Negative for dizziness and weakness.       Objective:      Physical Exam   Constitutional: She is oriented to person, place, and time. She appears well-developed and well-nourished. No distress.   HENT:   Head: Normocephalic and atraumatic.   Right Ear: External ear normal.   Left Ear: External ear normal.   Nose: Nose normal.   Mouth/Throat: Oropharynx is clear and moist. No oropharyngeal exudate.   Eyes: Conjunctivae are normal. Pupils " are equal, round, and reactive to light. Right eye exhibits no discharge. Left eye exhibits no discharge. No scleral icterus.   Neck: Neck supple. No JVD present. No tracheal deviation present. No thyromegaly present.   Cardiovascular: Normal rate, regular rhythm, normal heart sounds and intact distal pulses. Exam reveals no gallop and no friction rub.   No murmur heard.  Pulmonary/Chest: Effort normal and breath sounds normal. No stridor. No respiratory distress. She has no wheezes. She has no rales.   Abdominal: Soft. Bowel sounds are normal. She exhibits no distension and no mass. There is hepatomegaly. There is no splenomegaly. There is no tenderness. There is no rebound and no guarding.   Musculoskeletal: She exhibits no edema.   Lymphadenopathy:     She has no cervical adenopathy.   Neurological: She is alert and oriented to person, place, and time. Gait normal.   Skin: Skin is warm and dry. She is not diaphoretic. No cyanosis. Nails show no clubbing.   Psychiatric: She has a normal mood and affect.   Vitals reviewed.      ECO    Imaging:  CT 2018  Impression       Grossly stable disease with grossly unchanged size of the terminal ileum mass, adjacent lymph node, multiple liver metastasis, and multiple osseous metastasis    RECIST SUMMARY:    Date of prior examination for comparison: And PET-CT 2018, no contrasted imaging available    Lesion 1: Segment 4 of the liver.  6.0 cm cm.  Axial image 98/202    Lesion 2: Terminal ileum.  2.5 cm.  Axial image 153/202.    Lesion 3: Mesenteric nodule.  1.5 cm.  Axial image 141/202     Labs: 2019  Total Bilirubin 0.1 - 1.0 mg/dL 1.1 Abnormally high       Assessment:       1. Metastatic malignant neuroendocrine tumor to liver        Plan:         Explained to patient and family recommendation of Dr. Pinto is to treat with chemoembolization. Chemoembolization procedure discussed in detail with the patient including risks, benefits, potential  complications, usual pre and post procedure course, as well as potential to develop post-embolization syndrome. Explained patient will most likely be admitted for observation for 2-3 days due to risk of neuroendocrine crisis. Utilized images from the internet and illustrations to help explain procedure. Patient and family verbalize understanding and agreement. We will call to schedule once the physician's schedule has been posted. Dr. Garcia in room to sign consent. Consent signed. Clinic phone number provided. Confirmed contact phone number in EPIC.   Severe COVID-19  Therapy: Methylprednisone (4/2-4/6), plaquenil (4/1-4/6), anakinra TID (4/2-4/9), convalescent plasma (4/19), methylprednisone 100 mg x1 at RRT on 4/20.  O2: 15 L NRB continually proned  AC: Lovenox 60 mg BID c/b epistaxis  Intubate and on pressors  intubated and moved to the ICU for ventilator support

## 2020-04-23 NOTE — PROGRESS NOTE ADULT - PROBLEM SELECTOR PLAN 5
renal function has worsened slightly overnight  continue hydration as needed  avoid nephrotoxic agents

## 2020-04-23 NOTE — PROGRESS NOTE ADULT - PROBLEM SELECTOR PLAN 2
vent setting as per ICU   Patient tolerating Fio2 of 60 and 5 of peep  Prognosis poor  Daughter aware of prognosis and at this point wants comfort care

## 2020-04-23 NOTE — PROGRESS NOTE ADULT - ASSESSMENT
68 yo F with PMhx of HTN, HFpEF prediabetes presents to the ED with complaints of fever, chills, dry cough and worsening SOB admit to medicine for acute hypoxic respiratory failure 2/2 COVID-19 pneumonia. Patient intubated overnight due to worsening hypoxia. Moved to the ICU for further care.  NO improvement   Family leaning toward comfort care.

## 2020-04-23 NOTE — PROGRESS NOTE ADULT - SUBJECTIVE AND OBJECTIVE BOX
KIMBERLY KUMAR  MRN-[id number]  Patient is a 69y old  Female who presents with a chief complaint of 69F p/w sob and fever (2020 20:15)    HPI:  Portions of this History and Physical Exam are adopted from ER Provider Notation per [facility] policy to limit healthcare provider exposure during COVID19 Pandemic    Unable to reach patient in ED via phone. Called the patient's  Mr. Cooper Kumar to obtain additional history.  Adopted from ED notation:   HPI Objective Statement: 68 yo F with PMhx of HTN and prediabetes presents to the ED with complaints of fever, chills, dry cough and worsening SOB. Patient reports that she has been feeling febrile for the last 2 weeks. She has intermittent chills and dry cough. She feels chest pressure when she coughs. Her SOB has been worsening. She becomes short of breath when she walks to the bathroom from the bed. She also reports to have orthopnea and LE edema. She has been sleeping in the chair for the last couple of days. She denies any recent travel. She went to [employer] before her symptoms and thinks that she was exposed there. She reports to have HTN and prediabetes. She had C-sectionsx2 in the past. No family hx of heart or lung disease. Lives with her . Able to take care of ADLs. No hx of smoking or chronic alcohol consumptions. Allergic to neuomycin-develops rash and swelling.  PCP: Dr. Hugh Pinedo    In the ED, .2, 158/89, 86->111, 18 100% NRB  s/p [id number] IVx1, potassium 40 POx1 (2020 03:20)      Surgery/Hospital course:  COVID 19 Positive   Tx CTU after being intubated for hypoxic respiratory failure    R pig tail for PTX    Supine with improvement in ABG, responding to lasix gtt    REVIEW OF SYSTEMS:  Unable to obtain, vented     Vital Signs Last 24 Hrs  T(C): 36.9 (2020 04:00), Max: 37 (2020 12:00)  T(F): 98.4 (2020 04:00), Max: 98.6 (2020 12:00)  HR: 107 (2020 06:00) (79 - 110)  BP: --  BP(mean): --  RR: 38 (2020 06:00) (9 - 40)  SpO2: 95% (2020 06:00) (87% - 98%)    ============================I/O===========================   I&O's Detail    2020 07:01  -  2020 07:00  --------------------------------------------------------  IN:    dextrose 5%.: 250 mL    dextrose 5%.: 1550 mL    Enteral Tube Flush: 80 mL    Free Water: 500 mL    furosemide Infusion: 60 mL    IV PiggyBack: 50 mL    ketamine Infusion.: 244.8 mL    Nepro with Carb Steady: 500 mL    norepinephrine Infusion: 287.1 mL    propofol Infusion: 343.2 mL    rocuronium Infusion: 13 mL    vasopressin Infusion: 24 mL  Total IN: 3902.1 mL    OUT:    Chest Tube: 75 mL    Indwelling Catheter - Urethral: 2315 mL  Total OUT: 2390 mL    Total NET: 1512.1 mL        ============================ LABS =========================                        7.9    15.86 )-----------( 112      ( 2020 00:43 )             26.5         145  |  105  |  82<H>  ----------------------------<  323<H>  4.2   |  25  |  2.19<H>    Ca    8.7      2020 00:43  Phos  3.5       Mg     2.4         TPro  5.3<L>  /  Alb  2.7<L>  /  TBili  0.4  /  DBili  x   /  AST  42<H>  /  ALT  121<H>  /  AlkPhos  91      LIVER FUNCTIONS - ( 2020 00:43 )  Alb: 2.7 g/dL / Pro: 5.3 g/dL / ALK PHOS: 91 U/L / ALT: 121 U/L / AST: 42 U/L / GGT: x           PT/INR - ( 2020 00:43 )   PT: 14.0 sec;   INR: 1.21 ratio         PTT - ( 2020 00:43 )  PTT:39.8 sec  ABG - ( 2020 05:39 )  pH, Arterial: 7.25  pH, Blood: x     /  pCO2: 74    /  pO2: 76    / HCO3: 31    / Base Excess: 3.2   /  SaO2: 95                  ======================Micro/Rad/Cardio=================  Culture: Reviewed   CXR: Reviewed  ======================================================  PAST MEDICAL & SURGICAL HISTORY:  Prediabetes  Essential hypertension  H/O:     ====================ASSESSMENT ==============  COVID 19 Positive   Intubated   Hypoxic Respiratory failure   Acute kidney injury     Do not escalate care     Plan:  ====================== NEUROLOGY=====================  Sedated with Propofol, Ketamine   Dilaudid for analgesia     acetaminophen   Tablet .. 650 milliGRAM(s) Oral every 6 hours PRN Temp greater or equal to 38C (100.4F)  HYDROmorphone  Injectable 0.5 milliGRAM(s) IV Push every 4 hours PRN Moderate Pain (4 - 6)  ketamine Infusion. 0.1 mG/kG/Hr (0.68 mL/Hr) IV Continuous <Continuous>  propofol Infusion 50 MICROgram(s)/kG/Min (20.4 mL/Hr) IV Continuous <Continuous>    ==================== RESPIRATORY======================  On full vent support     Mechanical Ventilation:  Mode: AC/ CMV (Assist Control/ Continuous Mandatory Ventilation)  RR (machine): 40  FiO2: 80  PEEP: 5  ITime: 1  MAP: 17  PC: 38  PIP: 45      ====================CARDIOVASCULAR==================  On pressor support for hypotension     norepinephrine Infusion 0.05 MICROgram(s)/kG/Min (6.38 mL/Hr) IV Continuous <Continuous>    ===================HEMATOLOGIC/ONC ===================  Monitor H&H     ===================== RENAL =========================  Concern for GWENDOLYN, starting Sevelamer 800mg PO TID for hyperphosphatemia   Diuresis with Lasix, continue monitoring urine output    furosemide Infusion 10 mG/Hr (5 mL/Hr) IV Continuous <Continuous>  ==================== GASTROINTESTINAL===================  Tolerating tube feeds, Nepro carb steady 250 [id number]     dextrose 5%. 1000 milliLiter(s) (50 mL/Hr) IV Continuous <Continuous>  GI prophylaxis, famotidine Injectable 20 milliGRAM(s) IV Push once PRN Infusion reaction  sodium chloride 0.9% lock flush 10 milliLiter(s) IV Push every 1 hour PRN Pre/post blood products, medications, blood draw, and to maintain line patency    =======================    ENDOCRINE  =====================  Glucose control,   allopurinol 300 milliGRAM(s) Oral daily  insulin lispro (HumaLOG) corrective regimen sliding scale   SubCutaneous every 6 hours    ========================INFECTIOUS DISEASE================  Completed COVID regimen and steroids  Convalescent Plasma on      Decadron trial   dexAMETHasone  IVPB 20 milliGRAM(s) IV Intermittent daily      Patient requires continuous monitoring with bedside rhythm monitoring, pulse ox monitoring, and intermittent blood gas analysis. Care plan discussed with ICU care team. Patient remained critical and at risk for life threatening decompensation.    By signing my name below, IJo Ann, attest that this documentation has been prepared under the direction and in the presence of MYLENE Arce.  Electronically signed: Xin Wetzel, 20 @ 07:44    I, Chavez North, personally performed the services described in this documentation. all medical record entries made by the alejandroibapolonia were at my direction and in my presence. I have reviewed the chart and agree that the record reflects my personal performance and is accurate and complete  Electronically signed: MYLENE Arce

## 2020-04-23 NOTE — PROGRESS NOTE ADULT - NSHPATTENDINGPLANDISCUSS_GEN_ALL_CORE
Staff and daughter
Patient
Patient and daughter
Patient daughter and staff
patient and daughter
patient staff and daughter
patient staff and daughter
staff and family
staff and family
patient and daughter
staff and family
Patient and family
Staff and daughter

## 2020-04-23 NOTE — PROGRESS NOTE ADULT - REASON FOR ADMISSION
69F p/w sob and fever
69F p/w sob and fever Covid +
69F p/w sob and fever

## 2020-04-23 NOTE — PROGRESS NOTE ADULT - SUBJECTIVE AND OBJECTIVE BOX
70 yo F with PMhx of HTN and prediabetes presents to the ED with complaints of fever, chills, dry cough and worsening SOB. No acute events overnight. Patient has been remaining prone as much as possible.  Symptomatically patient feels a little better but O2 requirements are not lessened. Patient still unable to wean from 02. Patient with significant desaturation last night requiring intubation. Seen today In the ICU. Patient sedated and paralysed. OG tube placed for medication and nutrition Patine with severe  acidosis and elevated CO2 retention  Patientis intubated and sedate.  CO2 has gone down c/w yesterday.  BP and O2 sats fluctuating but patient not progressing with O2 requirements static  Had lengthy discussion with daughter Kellen who voiced opinion that family does not wish to continue  aggressive care and that if she decompensates then comfort cares should be the goal.  Discussed with ICU team re; family's wishes.      MEDICATIONS  (STANDING):  allopurinol 300 milliGRAM(s) Oral daily  chlorhexidine 0.12% Liquid 15 milliLiter(s) Oral Mucosa every 12 hours  chlorhexidine 2% Cloths 1 Application(s) Topical <User Schedule>  dexAMETHasone  IVPB 20 milliGRAM(s) IV Intermittent daily  dorzolamide 2%/timolol 0.5% Ophthalmic Solution 1 Drop(s) Both EYES two times a day  enoxaparin Injectable 60 milliGRAM(s) SubCutaneous every 12 hours  insulin lispro (HumaLOG) corrective regimen sliding scale   SubCutaneous every 6 hours  ketamine Infusion. 0.1 mG/kG/Hr (0.68 mL/Hr) IV Continuous <Continuous>  latanoprost 0.005% Ophthalmic Solution 1 Drop(s) Both EYES at bedtime  norepinephrine Infusion 0.05 MICROgram(s)/kG/Min (6.38 mL/Hr) IV Continuous <Continuous>  propofol Infusion 50 MICROgram(s)/kG/Min (20.4 mL/Hr) IV Continuous <Continuous>  rocuronium Infusion 8 MICROgram(s)/kG/Min (6.53 mL/Hr) IV Continuous <Continuous>  sodium chloride 0.9%. 1000 milliLiter(s) (10 mL/Hr) IV Continuous <Continuous>    MEDICATIONS  (PRN):  acetaminophen   Tablet .. 650 milliGRAM(s) Oral every 6 hours PRN Temp greater or equal to 38C (100.4F)  famotidine Injectable 20 milliGRAM(s) IV Push once PRN Infusion reaction  HYDROmorphone  Injectable 0.25 milliGRAM(s) IV Push every 8 hours PRN Moderate pain, anxiety, dyspnea  sodium chloride 0.9% lock flush 10 milliLiter(s) IV Push every 1 hour PRN Pre/post blood products, medications, blood draw, and to maintain line patency      Vital Signs Last 24 Hrs  T(C): 36.5 (2020 08:00), Max: 37 (2020 12:00)  T(F): 97.7 (2020 08:00), Max: 98.6 (2020 12:00)  HR: 100 (2020 09:04) (79 - 109)  BP: --  BP(mean): --  RR: 38 (2020 09:00) (9 - 40)  SpO2: 94% (2020 09:04) (87% - 98%)  I    GENERAL: lethargic intubated and sedated  HEAD:  Atraumatic  EYES: EOM, PERRLA, conjunctiva pink and sclera white  ENT: + epistaxis   NECK: Supple, No JVD, normal thyroid, carotids with normal upstrokes and no bruits  CHEST/LUNG: Clear to auscultation bilaterally, No rales, rhonchi, wheezing, or rubs  HEART: Regular rate and rhythm, No murmurs, rubs, or gallops  ABDOMEN: Patient currently prone  EXTREMITIES:  2+ Peripheral Pulses, No clubbing, cyanosis, or edema. No arthritis of shoulders, elbows, hands, hips, knees, ankles, or feet. No DJD C spine, T spine, or L/S spine  LYMPH: No lymphadenopathy noted  SKIN: No rashes or lesions  NERVOUS SYSTEM:  Alert but lethargic unable to speak    LABS:      ABG - ( 2020 05:39 )  pH, Arterial: 7.25  pH, Blood: x     /  pCO2: 74    /  pO2: 76    / HCO3: 31    / Base Excess: 3.2   /  SaO2: 95                    CBC Full  -  ( 2020 00:43 )  WBC Count : 15.86 K/uL  RBC Count : 2.54 M/uL  Hemoglobin : 7.9 g/dL  Hematocrit : 26.5 %  Platelet Count - Automated : 112 K/uL  Mean Cell Volume : 104.3 fl  Mean Cell Hemoglobin : 31.1 pg  Mean Cell Hemoglobin Concentration : 29.8 gm/dL  Auto Neutrophil # : x  Auto Lymphocyte # : x  Auto Monocyte # : x  Auto Eosinophil # : x  Auto Basophil # : x  Auto Neutrophil % : x  Auto Lymphocyte % : x  Auto Monocyte % : x  Auto Eosinophil % : x  Auto Basophil % : x        145  |  105  |  82<H>  ----------------------------<  323<H>  4.2   |  25  |  2.19<H>    Ca    8.7      2020 00:43  Phos  3.5     -  Mg     2.4         TPro  5.3<L>  /  Alb  2.7<L>  /  TBili  0.4  /  DBili  x   /  AST  42<H>  /  ALT  121<H>  /  AlkPhos  91      LIVER FUNCTIONS - ( 2020 00:43 )  Alb: 2.7 g/dL / Pro: 5.3 g/dL / ALK PHOS: 91 U/L / ALT: 121 U/L / AST: 42 U/L / GGT: x           PT/INR - ( 2020 00:43 )   PT: 14.0 sec;   INR: 1.21 ratio         PTT - ( 2020 00:43 )  PTT:39.8 sec        CBC Full  -  ( 2020 01:32 )  WBC Count : 17.20 K/uL  RBC Count : 2.89 M/uL  Hemoglobin : 8.9 g/dL  Hematocrit : 32.1 %  Platelet Count - Automated : 162 K/uL  Mean Cell Volume : 111.1 fl  Mean Cell Hemoglobin : 30.8 pg  Mean Cell Hemoglobin Concentration : 27.7 gm/dL  Auto Neutrophil # : x  Auto Lymphocyte # : x  Auto Monocyte # : x  Auto Eosinophil # : x  Auto Basophil # : x  Auto Neutrophil % : x  Auto Lymphocyte % : x  Auto Monocyte % : x  Auto Eosinophil % : x  Auto Basophil % : x        151<H>  |  111<H>  |  83<H>  ----------------------------<  348<H>  4.7   |  26  |  2.10<H>    Ca    9.1      2020 16:47  Phos  7.0     04-  Mg     3.2     -    TPro  5.6<L>  /  Alb  3.1<L>  /  TBili  0.6  /  DBili  x   /  AST  53<H>  /  ALT  149<H>  /  AlkPhos  93      LIVER FUNCTIONS - ( 2020 16:47 )  Alb: 3.1 g/dL / Pro: 5.6 g/dL / ALK PHOS: 93 U/L / ALT: 149 U/L / AST: 53 U/L / GGT: x           PT/INR - ( 2020 02:26 )   PT: 12.8 sec;   INR: 1.11 ratio         PTT - ( 2020 02:26 )  PTT:27.3 sec  Urinalysis Basic - ( 2020 05:57 )    Color: Yellow / Appearance: Clear / S.024 / pH: x  Gluc: x / Ketone: Negative  / Bili: Negative / Urobili: Negative   Blood: x / Protein: Trace / Nitrite: Negative   Leuk Esterase: Negative / RBC: 1 /hpf / WBC 1 /HPF   Sq Epi: x / Non Sq Epi: 0 /hpf / Bacteria: Negative            CARDIAC MARKERS ( 2020 02:26 )  x     / x     / 124 U/L / x     / x          CBC Full  -  ( 2020 02:26 )  WBC Count : 23.48 K/uL  RBC Count : 4.22 M/uL  Hemoglobin : 12.7 g/dL  Hematocrit : 42.0 %  Platelet Count - Automated : 229 K/uL  Mean Cell Volume : 99.5 fl  Mean Cell Hemoglobin : 30.1 pg  Mean Cell Hemoglobin Concentration : 30.2 gm/dL  Auto Neutrophil # : x  Auto Lymphocyte # : x  Auto Monocyte # : x  Auto Eosinophil # : x  Auto Basophil # : x  Auto Neutrophil % : x  Auto Lymphocyte % : x  Auto Monocyte % : x  Auto Eosinophil % : x  Auto Basophil % : x        159<H>  |  116<H>  |  83<H>  ----------------------------<  220<H>  5.7<H>   |  34<H>  |  1.37<H>    Ca    9.6      2020 15:16  Phos  2.5       Mg     2.8         TPro  6.1  /  Alb  3.8  /  TBili  0.5  /  DBili  x   /  AST  67<H>  /  ALT  196<H>  /  AlkPhos  89  04-21    LIVER FUNCTIONS - ( 2020 15:16 )  Alb: 3.8 g/dL / Pro: 6.1 g/dL / ALK PHOS: 89 U/L / ALT: 196 U/L / AST: 67 U/L / GGT: x           PT/INR - ( 2020 02:26 )   PT: 12.8 sec;   INR: 1.11 ratio         PTT - ( 2020 02:26 )  PTT:27.3 sec  Urinalysis Basic - ( 2020 05:57 )    Color: Yellow / Appearance: Clear / S.024 / pH: x  Gluc: x / Ketone: Negative  / Bili: Negative / Urobili: Negative   Blood: x / Protein: Trace / Nitrite: Negative   Leuk Esterase: Negative / RBC: 1 /hpf / WBC 1 /HPF   Sq Epi: x / Non Sq Epi: 0 /hpf / Bacteria: Negative      CAPILLARY BLOOD GLUCOSE      POCT Blood Glucose.: 264 mg/dL (2020 18:20)  POCT Blood Glucose.: 173 mg/dL (2020 17:43)      RADIOLOGY & ADDITIONAL TESTS:

## 2020-04-23 NOTE — DISCHARGE NOTE FOR THE EXPIRED PATIENT - HOSPITAL COURSE
: Admitted  : Intubated 2/2 hypoxic respiratory failure  : Right pigtail catheter placed for PTX, fluid resuscitated. Proned overnight due to worsening hypoxia despite optimized vent support  : Became extremely hypercarbic, proned briefly, worsening hypercarbia and hypoxia, deproned shortly after with slight improvement in ABG analysis. Required high dose of Levophed drip in order to support BP.  : Became increasingly hypercarbic again, increasingly acidotic, increasing ventilator requirements, still required high dose of Levophed. Daughter Joanie, next of kin was called at 11:43 AM. After discussion about GOC, she, along with the rest of her family, decided to make her DNR and withdraw care. Witnessed with Halina CHAKRABORTY. Daughter fully understood the situation and all questions were answered. Patient was then terminally weaned from the ventilator and sedated for comfort per Daughter's request. She was pronounced  on  at 12:55 PM.

## 2020-04-23 NOTE — DISCHARGE NOTE FOR THE EXPIRED PATIENT - REASON FOR ADMISSION
69F p/w sob and fever 69F p/w sob and fever, COVID-19 (+), acute hypoxic respiratory failure requiring intubation.

## 2020-05-25 PROBLEM — N90.89 VULVAR IRRITATION: Status: ACTIVE | Noted: 2020-05-25

## 2020-05-25 NOTE — PHYSICAL EXAM
[Labia Minora Erythema Left] : erythema of the left labia minora [Labia Majora Erythema Left] : erythema of the left labia majora [Normal] : clitoris [No Bleeding] : there was no active vaginal bleeding

## 2020-05-26 ENCOUNTER — APPOINTMENT (OUTPATIENT)
Dept: OBGYN | Facility: CLINIC | Age: 70
End: 2020-05-26

## 2020-05-27 ENCOUNTER — APPOINTMENT (OUTPATIENT)
Dept: OBGYN | Facility: CLINIC | Age: 70
End: 2020-05-27

## 2020-06-02 PROCEDURE — 71045 X-RAY EXAM CHEST 1 VIEW: CPT

## 2020-06-02 PROCEDURE — 83880 ASSAY OF NATRIURETIC PEPTIDE: CPT

## 2020-06-02 PROCEDURE — 84480 ASSAY TRIIODOTHYRONINE (T3): CPT

## 2020-06-02 PROCEDURE — 82435 ASSAY OF BLOOD CHLORIDE: CPT

## 2020-06-02 PROCEDURE — 82962 GLUCOSE BLOOD TEST: CPT

## 2020-06-02 PROCEDURE — 85379 FIBRIN DEGRADATION QUANT: CPT

## 2020-06-02 PROCEDURE — 85027 COMPLETE CBC AUTOMATED: CPT

## 2020-06-02 PROCEDURE — 86900 BLOOD TYPING SEROLOGIC ABO: CPT

## 2020-06-02 PROCEDURE — 86140 C-REACTIVE PROTEIN: CPT

## 2020-06-02 PROCEDURE — 86022 PLATELET ANTIBODIES: CPT

## 2020-06-02 PROCEDURE — 82565 ASSAY OF CREATININE: CPT

## 2020-06-02 PROCEDURE — 84295 ASSAY OF SERUM SODIUM: CPT

## 2020-06-02 PROCEDURE — 85385 FIBRINOGEN ANTIGEN: CPT

## 2020-06-02 PROCEDURE — 84436 ASSAY OF TOTAL THYROXINE: CPT

## 2020-06-02 PROCEDURE — 87635 SARS-COV-2 COVID-19 AMP PRB: CPT

## 2020-06-02 PROCEDURE — 82803 BLOOD GASES ANY COMBINATION: CPT

## 2020-06-02 PROCEDURE — 99285 EMERGENCY DEPT VISIT HI MDM: CPT | Mod: 25

## 2020-06-02 PROCEDURE — 82550 ASSAY OF CK (CPK): CPT

## 2020-06-02 PROCEDURE — 82947 ASSAY GLUCOSE BLOOD QUANT: CPT

## 2020-06-02 PROCEDURE — 84132 ASSAY OF SERUM POTASSIUM: CPT

## 2020-06-02 PROCEDURE — 86901 BLOOD TYPING SEROLOGIC RH(D): CPT

## 2020-06-02 PROCEDURE — 87150 DNA/RNA AMPLIFIED PROBE: CPT

## 2020-06-02 PROCEDURE — 84484 ASSAY OF TROPONIN QUANT: CPT

## 2020-06-02 PROCEDURE — 85610 PROTHROMBIN TIME: CPT

## 2020-06-02 PROCEDURE — 81001 URINALYSIS AUTO W/SCOPE: CPT

## 2020-06-02 PROCEDURE — 70450 CT HEAD/BRAIN W/O DYE: CPT

## 2020-06-02 PROCEDURE — 87040 BLOOD CULTURE FOR BACTERIA: CPT

## 2020-06-02 PROCEDURE — 84145 PROCALCITONIN (PCT): CPT

## 2020-06-02 PROCEDURE — 85652 RBC SED RATE AUTOMATED: CPT

## 2020-06-02 PROCEDURE — 84443 ASSAY THYROID STIM HORMONE: CPT

## 2020-06-02 PROCEDURE — P9045: CPT

## 2020-06-02 PROCEDURE — 94002 VENT MGMT INPAT INIT DAY: CPT

## 2020-06-02 PROCEDURE — 84478 ASSAY OF TRIGLYCERIDES: CPT

## 2020-06-02 PROCEDURE — 83615 LACTATE (LD) (LDH) ENZYME: CPT

## 2020-06-02 PROCEDURE — 86850 RBC ANTIBODY SCREEN: CPT

## 2020-06-02 PROCEDURE — 93005 ELECTROCARDIOGRAM TRACING: CPT

## 2020-06-02 PROCEDURE — 82553 CREATINE MB FRACTION: CPT

## 2020-06-02 PROCEDURE — 82728 ASSAY OF FERRITIN: CPT

## 2020-06-02 PROCEDURE — 83735 ASSAY OF MAGNESIUM: CPT

## 2020-06-02 PROCEDURE — 96374 THER/PROPH/DIAG INJ IV PUSH: CPT

## 2020-06-02 PROCEDURE — 86803 HEPATITIS C AB TEST: CPT

## 2020-06-02 PROCEDURE — 85014 HEMATOCRIT: CPT

## 2020-06-02 PROCEDURE — 80053 COMPREHEN METABOLIC PANEL: CPT

## 2020-06-02 PROCEDURE — 94003 VENT MGMT INPAT SUBQ DAY: CPT

## 2020-06-02 PROCEDURE — 80048 BASIC METABOLIC PNL TOTAL CA: CPT

## 2020-06-02 PROCEDURE — 83605 ASSAY OF LACTIC ACID: CPT

## 2020-06-02 PROCEDURE — 85730 THROMBOPLASTIN TIME PARTIAL: CPT

## 2020-06-02 PROCEDURE — 82330 ASSAY OF CALCIUM: CPT

## 2020-06-02 PROCEDURE — 84100 ASSAY OF PHOSPHORUS: CPT

## 2021-06-23 NOTE — STROKE CODE NOTE - NIH STROKE SCALE: 2. BEST GAZE, QM
VM left for parent with appointment reminder for 6/25 at 8:40 with Dr. Owens. Office number provided.   
(0) Normal

## 2022-11-26 NOTE — PROGRESS NOTE ADULT - PROBLEM SELECTOR PLAN 7
Patient seems dehydrated with hypernatremia  will start D51/2 NS at 75. Diet: regular  Dvt ppx: Lovenox Private car

## 2023-03-29 NOTE — PROGRESS NOTE ADULT - PROBLEM SELECTOR PLAN 6
Patient : Kath Calzada Age: 45 year old Sex: female   MRN: [id number] Encounter Date: 3/28/2023    History     Chief Complaint   Patient presents with   • Back Pain       HPI    Kath Calzada is a 45 year old with a history of chronic low back pain, recent injection on 3/16/23 per pain management, s/p laminectomy, RA on methotrexate, fibromyalgia presenting to the emergency department with left sided low back pain radiating down L leg x 4 days. Denies injuries. Pain and numbness of lateral thigh. Has had this in the past but pain worse than normal. No bladder/ bowel dysfunction, saddle anesthesia, urinary incontinence. No fevers. Also reports right breast redness and open sore. Noticed this 7 days ago- has been draining puss. Reports it actually feels better but is still red and painful. 200mg ibuprofen this morning. Takes baclofen at night.     I have reviewed Kath Calzada's previous radiology note from MRI lumbar spine 6/4/2020.  Note Review Summary: IMPRESSION:      1.  Posterior laminectomy and interbody fusion L5-S1.     2.  Multilevel degenerative changes within the spine, minimally progressed  in comparison to prior MRI 2/6/2017.     3.  Multilevel degenerative spondyloarthropathy most significant at right  L4-L5 demonstrating moderate right and mild left stenosis, similar compared  to prior. Additional levels as described above.    Sees Dr Cazares, pain management.     Allergies   Allergen Reactions   • Levaquin HIVES       No current facility-administered medications for this encounter.     Current Outpatient Medications   Medication Sig   • Humira Pen 40 MG/0.4ML citrate free Inject 0.4 mLs into the skin every 14 days.   • ondansetron (Zofran) 8 MG tablet Take 1 tablet by mouth every 8 hours as needed for Nausea.   • hydroCHLOROthiazide (HYDRODIURIL) 12.5 MG tablet TAKE 1 TABLET DAILY   • pregabalin (LYRICA) 75 MG capsule TAKE 1 CAPSULE BY MOUTH IN THE MORNING AND IN THE EVENING   • buPROPion XL  (WELLBUTRIN XL) 300 MG 24 hr tablet Take 1 tablet by mouth daily.   • DULoxetine (CYMBALTA) 30 MG capsule Take 1 capsule by mouth in the morning and 1 capsule in the evening.   • lamoTRIgine (LaMICtal) 100 MG tablet Take 1 tablet by mouth daily.   • trazodone (DESYREL) 150 MG tablet Take 1 tablet by mouth nightly.   • lisdexamfetamine (VYVANSE) 50 MG capsule Take 1 capsule by mouth every morning. Indications: Attention Deficit Hyperactivity Disorder   • rosuvastatin (CRESTOR) 10 MG tablet TAKE 1 TABLET EVERY OTHER DAY   • methotrexate (RHEUMATREX) 2.5 MG tablet TAKE 6 TABLETS BY MOUTH EVERY 7 DAYS( LABS DO EVERY 12 WEEKS)   • hydroxychloroquine (PLAQUENIL) 200 MG tablet Take 1 tablet by mouth in the morning and 1 tablet in the evening.   • folic acid (FOLATE) 1 MG tablet Take 1 tablet by mouth daily.   • potassium CHLORIDE (KLOR-CON M) 20 MEQ elijah ER tablet TAKE 1 TABLET BY MOUTH DAILY   • baclofen (LIORESAL) 10 MG tablet Take 1 tablet by mouth in the morning and 1 tablet at noon and 1 tablet in the evening.   • tirzepatide (Mounjaro) 10 MG/0.5ML Solution Pen-injector Inject 10 mg into the skin once weekly   • losartan (COZAAR) 100 MG tablet TAKE 1 TABLET DAILY   • pantoprazole (PROTONIX) 40 MG tablet Take 1 tablet by mouth daily.   • albuterol 108 (90 Base) MCG/ACT inhaler USE 2 INHALATIONS EVERY 4 HOURS AS NEEDED FOR SHORTNESS OF BREATH OR WHEEZING   • fexofenadine (ALLEGRA) 180 MG tablet Take 1 tablet by mouth in the morning and 1 tablet in the evening.   • potassium chloride (KLOR-CON) 10 MEQ ER tablet Take 1 tablet by mouth 2 times daily. (Patient taking differently: Take 10 mEq by mouth in the morning and 10 mEq in the evening. PRN)   • Multiple Vitamin (MULTIVITAMIN) tablet Take 1 tablet by mouth daily. LD 11/16/2022 for 11/21 procedure       Past Medical History:   Diagnosis Date   • ADHD 2016   • Anesthesia complication     ~2013 pt states \" I stopped breathing during meniscus repair, needed Ventilator for  4-5 days\"; and after hyst went into atrial fib   • Anxiety 2012   • Asthma 2012    Onset of symptoms after pregnancy 22 years ago.   • Atrial fibrillation (CMD)     after hysterctomy; none since on verapamil    • Chronic pain    • Depression 2012   • Diabetes mellitus (CMD)     gestational   • Disorder of bone and cartilage, unspecified 04/06/2009   • Elevated antinuclear antibody (KATHRYN) level 10/24/2022   • Esophagitis, reflux    • Fibromyalgia 2021   • GERD (gastroesophageal reflux disease)    • Headaches, cluster    • HTN (hypertension)    • Hyperlipidemia    • Interstitial cystitis    • Lumbar degenerative disc disease     At L5-S1   • Obesity    • Personal history of traumatic fracture     vertebrae, ankle   • PONV (postoperative nausea and vomiting)     nausea after hysterectomy   • Reflux esophagitis 06/08/2009   • Sleep apnea 03/24/2014    On full face mask.  APAP 5-15.  [facility] is [facility] followed by Dr. Heredia., bring DOS   • Swallowing difficulty 11/16/2022    chokes easily, chews carefully, a lot of water with meds, has someone present   • TMJ dysfunction    • Urticaria     Chronic idiopathic urticaria   • Vitamin D deficiency 2015       Past Surgical History:   Procedure Laterality Date   • Ankle fracture surgery  2009    two surgeries to R ankle   • Back surgery     • Carpal tunnel release  08/24/2012    [facility] by Dr. HERMINIA Judd   • Colonoscopy diagnostic  02/09/2019    5yr recall    • Colonoscopy w biopsy  05/27/2009    Diverticulosis, polyp5 yr recall   • Colonoscopy w biopsy  05/27/2009   • Endometrial ablation     • Esophagogastroduodenoscopy  03/10/2020    Dr. Elizalde, EGD   • Esophagogastroduodenoscopy transoral flex w/bx single or mult  06/08/2009    esophagitis   • Esophagogastroduodenoscopy transoral flex w/bx single or mult  11/21/2022    Dr. Elizalde   • Fracture surgery     • Hysterectomy  02/20/2008    pt states had  Afib post op, resolved w/out tmt per pt   • Hysterectomy  2010    approx date, afib post  op   • Hysteroscopic tubal steril     • Knee surgery  2013    meniscus tear repair; complication respiratory failure on vent x4days post op. pt uncertain of dx.   • Laparoscopy unlisted proc      lysis of adhesions   • Leg surgery  2010    broken L leg (Three surgeries total)   • Oophorectomy     • Pap smear  2008    neg.   • Steroid injection knee  2020    [facility]an knee   • Tonsillectomy and adenoidectomy     • Vaginal delivery      X2        Family History   Problem Relation Age of Onset   • High blood pressure Mother    • High cholesterol Mother    • Diabetes Mother    • Depression Mother    • Cancer, Prostate Father    • Depression Father    • High cholesterol Father    • Hypertension Father    • Myocardial Infarction Brother    • Urticaria Daughter    • Cancer, Breast Paternal Aunt    • Cancer, Pancreatic Paternal Cousin        Social History     Tobacco Use   • Smoking status: Some Days     Packs/day: 0.50     Years: 20.00     Pack years: 10.00     Types: Cigarettes     Last attempt to quit: 2022     Years since quittin.2   • Smokeless tobacco: Never   • Tobacco comments:      weekend smoking 5 cigarettes on occassion   Vaping Use   • Vaping Use: never used   Substance Use Topics   • Alcohol use: Not Currently     Comment: last a year ago, hx 5 drinks on weekends   • Drug use: No       Review of Systems     Review of Systems   Constitutional: Negative for activity change, chills, fatigue and fever.   HENT: Negative for congestion, rhinorrhea, sinus pressure, sore throat and trouble swallowing.    Eyes: Negative for visual disturbance.   Respiratory: Negative for cough, shortness of breath and wheezing.    Cardiovascular: Negative for chest pain and palpitations.   Gastrointestinal: Negative for abdominal pain, blood in stool, constipation, diarrhea, nausea and vomiting.   Genitourinary: Negative for decreased urine volume, difficulty urinating, dysuria and hematuria.    Musculoskeletal: Positive for back pain (radiates down left leg). Negative for arthralgias, gait problem and myalgias.   Skin: Negative for rash.        Right breast redness, drainage, sore   Neurological: Negative for dizziness, syncope, weakness, light-headedness and headaches.       Physical Exam     ED Triage Vitals [03/28/23 1826]   ED Triage Vitals Group      Temp 98.3 °F (36.8 °C)      Heart Rate 91      Resp 20      BP (!) 141/59      SpO2 98 %      EtCO2 mmHg       Height 5' 3\" (1.6 m)      Weight 221 lb (100.2 kg)      Weight Scale Used Standing scale      BMI (Calculated) 39.15      IBW/kg (Calculated) 52.4       Physical Exam  Constitutional:       General: She is not in acute distress.     Appearance: Normal appearance. She is not diaphoretic.   HENT:      Head: Normocephalic and atraumatic.      Right Ear: External ear normal.      Left Ear: External ear normal.      Nose: Nose normal.      Mouth/Throat:      Mouth: Mucous membranes are moist.      Pharynx: No oropharyngeal exudate or posterior oropharyngeal erythema.   Eyes:      Extraocular Movements: Extraocular movements intact.      Conjunctiva/sclera: Conjunctivae normal.      Pupils: Pupils are equal, round, and reactive to light.   Cardiovascular:      Rate and Rhythm: Normal rate and regular rhythm.      Heart sounds: No murmur heard.  Pulmonary:      Effort: Pulmonary effort is normal. No respiratory distress.      Breath sounds: Normal breath sounds. No wheezing or rhonchi.   Abdominal:      General: Abdomen is flat. Bowel sounds are normal. There is no distension.      Palpations: Abdomen is soft.      Tenderness: There is no abdominal tenderness. There is no guarding or rebound.   Musculoskeletal:         General: Normal range of motion.      Cervical back: Normal range of motion and neck supple. No tenderness.      Comments: Lumbar surgical scar, no midline tenderness, left paraspinal and point tenderness between SI and hip joints. Full  ROM of LE, no weakness. Sensation intact throughout. DP/ PT 2+. Ambulatory.    Skin:     General: Skin is warm and dry.      Comments: Right outer, lower breast with 3cm area of erythema, scab with purulent dried drainage in center. No fluctuance. Tender    Neurological:      General: No focal deficit present.      Mental Status: She is alert and oriented to person, place, and time. Mental status is at baseline.      Motor: No weakness.   Psychiatric:         Mood and Affect: Mood normal.         Behavior: Behavior normal.           Procedures     Procedures    Lab Results     No results found for this visit on 03/28/23.    EKG     No EKG performed    Radiology Results     Imaging Results    None         ED Medications     Medications - No data to display      ED Course     Vitals:    03/28/23 1826   BP: (!) 141/59   BP Location: RUE - Right upper extremity   Patient Position: Sitting/High-Wood's   Pulse: 91   Resp: 20   Temp: 98.3 °F (36.8 °C)   TempSrc: Oral   SpO2: 98%   Weight: 100.2 kg (221 lb)   Height: 5' 3\" (1.6 m)   LMP: 12/25/2001       ED Course as of 03/28/23 1913   Tue Mar 28, 2023   1900 Initial patient assessment. Plan for pain control for low back pain/ sciatica. No alarm symptoms requiring MRI. Will get labs, US for breast abscess given immunocompromised status. Afebrile.  [DK]      ED Course User Index  [DK] Cynthia Guerra PA-C       No cardiac monitor or imaging reviewed        Consults                    MDM                            MDM done in ED Course        Does the Patient have sepsis: NO     Critical Care       No Critical Care        Disposition     Patient care signed out to ISABELLA Ahmadi at the end of my shift. Sign-out included relevant details of history, physical, and work-up to date. Will follow up on work up, pain control and plan of care.            There is no disposition no dispo time  There is no comment             Cynthia Guerra PA-C  03/28/23 1915     - c/w lisinopril 20 (Per WHO guidelines there is no recommendation to discontinue ace-inhibitor medication)  - HCTZ/Triamterene is dosed every other day outpatient, will hold for now

## 2024-04-10 NOTE — ED PROVIDER NOTE - NS ED MD DISPO DIVISION
[facility] Pt resides in a pvt home w/ spouse, 3 steps to enter (+HR), one flight of stairs inside. PTA pt was independent w/ all functional mobility & ADL's. Did not use an AD for ambulation.